# Patient Record
Sex: FEMALE | Race: WHITE | Employment: UNEMPLOYED | URBAN - METROPOLITAN AREA
[De-identification: names, ages, dates, MRNs, and addresses within clinical notes are randomized per-mention and may not be internally consistent; named-entity substitution may affect disease eponyms.]

---

## 2017-02-14 ENCOUNTER — HOSPITAL ENCOUNTER (OUTPATIENT)
Dept: NEUROLOGY | Age: 52
Discharge: HOME | End: 2017-02-14
Attending: FAMILY MEDICINE | Admitting: FAMILY MEDICINE

## 2017-02-14 DIAGNOSIS — R20.0 ANESTHESIA OF SKIN: ICD-10-CM

## 2017-03-16 RX ORDER — SODIUM CHLORIDE, SODIUM LACTATE, POTASSIUM CHLORIDE, CALCIUM CHLORIDE 600; 310; 30; 20 MG/100ML; MG/100ML; MG/100ML; MG/100ML
INJECTION, SOLUTION INTRAVENOUS CONTINUOUS
Status: CANCELLED | OUTPATIENT
Start: 2017-03-16

## 2017-03-17 ENCOUNTER — HOSPITAL ENCOUNTER (OUTPATIENT)
Dept: OTHER | Age: 52
Discharge: HOME | End: 2017-03-17
Attending: INTERNAL MEDICINE | Admitting: INTERNAL MEDICINE

## 2017-03-23 ENCOUNTER — HOSPITAL ENCOUNTER (OUTPATIENT)
Dept: OTHER | Age: 52
Discharge: HOME | End: 2017-03-23
Attending: INTERNAL MEDICINE | Admitting: INTERNAL MEDICINE

## 2017-03-23 VITALS
RESPIRATION RATE: 16 BRPM | HEART RATE: 74 BPM | DIASTOLIC BLOOD PRESSURE: 78 MMHG | SYSTOLIC BLOOD PRESSURE: 133 MMHG | OXYGEN SATURATION: 96 % | HEIGHT: 64 IN | WEIGHT: 190 LBS | BODY MASS INDEX: 32.44 KG/M2 | TEMPERATURE: 97.8 F

## 2017-03-23 LAB
GLUCOSE BLD-MCNC: 116 MG/DL (ref 70–99)
GLUCOSE BLD-MCNC: 125 MG/DL (ref 70–99)
PERFORMED ON: ABNORMAL
PERFORMED ON: ABNORMAL

## 2017-03-23 RX ORDER — LANSOPRAZOLE 30 MG/1
30 CAPSULE, DELAYED RELEASE ORAL DAILY
COMMUNITY
End: 2020-05-13

## 2017-03-23 RX ORDER — SODIUM CHLORIDE, SODIUM LACTATE, POTASSIUM CHLORIDE, CALCIUM CHLORIDE 600; 310; 30; 20 MG/100ML; MG/100ML; MG/100ML; MG/100ML
INJECTION, SOLUTION INTRAVENOUS CONTINUOUS
Status: DISCONTINUED | OUTPATIENT
Start: 2017-03-23 | End: 2017-03-24 | Stop reason: HOSPADM

## 2017-03-23 RX ORDER — FLUOXETINE HYDROCHLORIDE 20 MG/1
150 CAPSULE ORAL DAILY
COMMUNITY

## 2017-03-23 RX ORDER — LISINOPRIL 20 MG/1
20 TABLET ORAL DAILY
COMMUNITY
End: 2017-09-08

## 2017-03-23 RX ADMIN — SODIUM CHLORIDE, SODIUM LACTATE, POTASSIUM CHLORIDE, CALCIUM CHLORIDE: 600; 310; 30; 20 INJECTION, SOLUTION INTRAVENOUS at 08:47

## 2017-03-23 ASSESSMENT — PAIN - FUNCTIONAL ASSESSMENT: PAIN_FUNCTIONAL_ASSESSMENT: 0-10

## 2017-03-23 ASSESSMENT — PAIN SCALES - GENERAL: PAINLEVEL_OUTOF10: 0

## 2017-05-01 ENCOUNTER — OFFICE VISIT (OUTPATIENT)
Dept: ORTHOPEDIC SURGERY | Age: 52
End: 2017-05-01

## 2017-05-01 VITALS
DIASTOLIC BLOOD PRESSURE: 79 MMHG | SYSTOLIC BLOOD PRESSURE: 130 MMHG | WEIGHT: 200 LBS | BODY MASS INDEX: 34.15 KG/M2 | HEIGHT: 64 IN | HEART RATE: 86 BPM

## 2017-05-01 DIAGNOSIS — M25.561 RIGHT KNEE PAIN, UNSPECIFIED CHRONICITY: Primary | ICD-10-CM

## 2017-05-01 DIAGNOSIS — S83.241A ACUTE MEDIAL MENISCAL TEAR, RIGHT, INITIAL ENCOUNTER: ICD-10-CM

## 2017-05-01 PROCEDURE — 99203 OFFICE O/P NEW LOW 30 MIN: CPT | Performed by: ORTHOPAEDIC SURGERY

## 2017-05-01 PROCEDURE — L1810 KO ELASTIC WITH JOINTS: HCPCS | Performed by: ORTHOPAEDIC SURGERY

## 2017-05-01 PROCEDURE — 20610 DRAIN/INJ JOINT/BURSA W/O US: CPT | Performed by: ORTHOPAEDIC SURGERY

## 2017-05-01 PROCEDURE — 73564 X-RAY EXAM KNEE 4 OR MORE: CPT | Performed by: ORTHOPAEDIC SURGERY

## 2017-05-22 ENCOUNTER — OFFICE VISIT (OUTPATIENT)
Dept: ORTHOPEDIC SURGERY | Age: 52
End: 2017-05-22

## 2017-05-22 VITALS — BODY MASS INDEX: 34.15 KG/M2 | HEIGHT: 64 IN | WEIGHT: 200 LBS

## 2017-05-22 DIAGNOSIS — S83.241A TEAR OF MEDIAL MENISCUS OF RIGHT KNEE, UNSPECIFIED TEAR TYPE, UNSPECIFIED WHETHER OLD OR CURRENT TEAR, INITIAL ENCOUNTER: Primary | ICD-10-CM

## 2017-05-22 PROCEDURE — 99213 OFFICE O/P EST LOW 20 MIN: CPT | Performed by: PHYSICIAN ASSISTANT

## 2017-06-28 ENCOUNTER — OFFICE VISIT (OUTPATIENT)
Dept: ORTHOPEDIC SURGERY | Age: 52
End: 2017-06-28

## 2017-06-28 VITALS — HEIGHT: 64 IN | WEIGHT: 199.96 LBS | BODY MASS INDEX: 34.14 KG/M2

## 2017-06-28 DIAGNOSIS — S83.241A TEAR OF MEDIAL MENISCUS OF RIGHT KNEE, UNSPECIFIED TEAR TYPE, UNSPECIFIED WHETHER OLD OR CURRENT TEAR, INITIAL ENCOUNTER: Primary | ICD-10-CM

## 2017-06-28 PROCEDURE — 99213 OFFICE O/P EST LOW 20 MIN: CPT | Performed by: ORTHOPAEDIC SURGERY

## 2017-06-30 DIAGNOSIS — S83.231D COMPLEX TEAR OF MEDIAL MENISCUS OF RIGHT KNEE AS CURRENT INJURY, SUBSEQUENT ENCOUNTER: Primary | ICD-10-CM

## 2017-07-28 ENCOUNTER — TELEPHONE (OUTPATIENT)
Dept: ORTHOPEDIC SURGERY | Age: 52
End: 2017-07-28

## 2017-08-04 ENCOUNTER — HOSPITAL ENCOUNTER (OUTPATIENT)
Dept: PHYSICAL THERAPY | Age: 52
Discharge: HOME | End: 2017-06-30
Admitting: ORTHOPAEDIC SURGERY

## 2017-08-30 ENCOUNTER — TELEPHONE (OUTPATIENT)
Dept: ORTHOPEDIC SURGERY | Age: 52
End: 2017-08-30

## 2017-08-30 DIAGNOSIS — S83.231D COMPLEX TEAR OF MEDIAL MENISCUS OF RIGHT KNEE AS CURRENT INJURY, SUBSEQUENT ENCOUNTER: Primary | ICD-10-CM

## 2017-09-12 ENCOUNTER — HOSPITAL ENCOUNTER (OUTPATIENT)
Dept: SURGERY | Age: 52
Discharge: HOME | End: 2017-09-12
Attending: ORTHOPAEDIC SURGERY | Admitting: ORTHOPAEDIC SURGERY

## 2017-09-12 VITALS
TEMPERATURE: 97.8 F | HEART RATE: 70 BPM | SYSTOLIC BLOOD PRESSURE: 132 MMHG | OXYGEN SATURATION: 96 % | WEIGHT: 187 LBS | BODY MASS INDEX: 33.13 KG/M2 | DIASTOLIC BLOOD PRESSURE: 69 MMHG | RESPIRATION RATE: 11 BRPM | HEIGHT: 63 IN

## 2017-09-12 LAB
GLUCOSE BLD-MCNC: 110 MG/DL (ref 70–99)
PERFORMED ON: ABNORMAL

## 2017-09-12 RX ORDER — PROMETHAZINE HYDROCHLORIDE 25 MG/ML
6.25 INJECTION, SOLUTION INTRAMUSCULAR; INTRAVENOUS
Status: ACTIVE | OUTPATIENT
Start: 2017-09-12 | End: 2017-09-12

## 2017-09-12 RX ORDER — OXYCODONE HYDROCHLORIDE AND ACETAMINOPHEN 5; 325 MG/1; MG/1
2 TABLET ORAL PRN
Status: ACTIVE | OUTPATIENT
Start: 2017-09-12 | End: 2017-09-12

## 2017-09-12 RX ORDER — DIPHENHYDRAMINE HYDROCHLORIDE 50 MG/ML
12.5 INJECTION INTRAMUSCULAR; INTRAVENOUS
Status: ACTIVE | OUTPATIENT
Start: 2017-09-12 | End: 2017-09-12

## 2017-09-12 RX ORDER — OXYCODONE HYDROCHLORIDE AND ACETAMINOPHEN 5; 325 MG/1; MG/1
1 TABLET ORAL PRN
Status: ACTIVE | OUTPATIENT
Start: 2017-09-12 | End: 2017-09-12

## 2017-09-12 RX ORDER — MEPERIDINE HYDROCHLORIDE 50 MG/ML
12.5 INJECTION INTRAMUSCULAR; INTRAVENOUS; SUBCUTANEOUS EVERY 5 MIN PRN
Status: DISCONTINUED | OUTPATIENT
Start: 2017-09-12 | End: 2017-09-13 | Stop reason: HOSPADM

## 2017-09-12 RX ORDER — ONDANSETRON 2 MG/ML
4 INJECTION INTRAMUSCULAR; INTRAVENOUS
Status: ACTIVE | OUTPATIENT
Start: 2017-09-12 | End: 2017-09-12

## 2017-09-12 RX ORDER — HYDRALAZINE HYDROCHLORIDE 20 MG/ML
5 INJECTION INTRAMUSCULAR; INTRAVENOUS EVERY 10 MIN PRN
Status: DISCONTINUED | OUTPATIENT
Start: 2017-09-12 | End: 2017-09-13 | Stop reason: HOSPADM

## 2017-09-12 RX ORDER — MORPHINE SULFATE 2 MG/ML
1 INJECTION, SOLUTION INTRAMUSCULAR; INTRAVENOUS EVERY 5 MIN PRN
Status: DISCONTINUED | OUTPATIENT
Start: 2017-09-12 | End: 2017-09-13 | Stop reason: HOSPADM

## 2017-09-12 RX ORDER — LIDOCAINE HYDROCHLORIDE 10 MG/ML
1 INJECTION, SOLUTION EPIDURAL; INFILTRATION; INTRACAUDAL; PERINEURAL
Status: DISPENSED | OUTPATIENT
Start: 2017-09-12 | End: 2017-09-12

## 2017-09-12 RX ORDER — MORPHINE SULFATE 2 MG/ML
2 INJECTION, SOLUTION INTRAMUSCULAR; INTRAVENOUS EVERY 5 MIN PRN
Status: DISCONTINUED | OUTPATIENT
Start: 2017-09-12 | End: 2017-09-13 | Stop reason: HOSPADM

## 2017-09-12 RX ORDER — SODIUM CHLORIDE, SODIUM LACTATE, POTASSIUM CHLORIDE, CALCIUM CHLORIDE 600; 310; 30; 20 MG/100ML; MG/100ML; MG/100ML; MG/100ML
INJECTION, SOLUTION INTRAVENOUS CONTINUOUS
Status: DISCONTINUED | OUTPATIENT
Start: 2017-09-12 | End: 2017-09-13 | Stop reason: HOSPADM

## 2017-09-12 RX ORDER — HYDROCODONE BITARTRATE AND ACETAMINOPHEN 5; 325 MG/1; MG/1
1 TABLET ORAL EVERY 6 HOURS PRN
Qty: 28 TABLET | Refills: 0 | Status: SHIPPED | OUTPATIENT
Start: 2017-09-12 | End: 2017-09-18 | Stop reason: SDUPTHER

## 2017-09-12 RX ORDER — ACETAMINOPHEN 500 MG
500 TABLET ORAL EVERY 6 HOURS PRN
COMMUNITY

## 2017-09-12 RX ORDER — SODIUM CHLORIDE 0.9 % (FLUSH) 0.9 %
10 SYRINGE (ML) INJECTION EVERY 12 HOURS SCHEDULED
Status: DISCONTINUED | OUTPATIENT
Start: 2017-09-12 | End: 2017-09-13 | Stop reason: HOSPADM

## 2017-09-12 RX ORDER — IBUPROFEN 400 MG/1
600 TABLET ORAL EVERY 6 HOURS PRN
Status: ON HOLD | COMMUNITY
End: 2019-07-16 | Stop reason: HOSPADM

## 2017-09-12 RX ORDER — SODIUM CHLORIDE 0.9 % (FLUSH) 0.9 %
10 SYRINGE (ML) INJECTION PRN
Status: DISCONTINUED | OUTPATIENT
Start: 2017-09-12 | End: 2017-09-13 | Stop reason: HOSPADM

## 2017-09-12 RX ORDER — LABETALOL HYDROCHLORIDE 5 MG/ML
5 INJECTION, SOLUTION INTRAVENOUS EVERY 10 MIN PRN
Status: DISCONTINUED | OUTPATIENT
Start: 2017-09-12 | End: 2017-09-13 | Stop reason: HOSPADM

## 2017-09-12 RX ORDER — ACETAMINOPHEN 10 MG/ML
1000 INJECTION, SOLUTION INTRAVENOUS ONCE
Status: COMPLETED | OUTPATIENT
Start: 2017-09-12 | End: 2017-09-12

## 2017-09-12 RX ADMIN — Medication 0.5 MG: at 11:46

## 2017-09-12 RX ADMIN — Medication 0.5 MG: at 12:02

## 2017-09-12 RX ADMIN — Medication 0.5 MG: at 12:11

## 2017-09-12 RX ADMIN — Medication 0.5 MG: at 11:54

## 2017-09-12 ASSESSMENT — PAIN - FUNCTIONAL ASSESSMENT: PAIN_FUNCTIONAL_ASSESSMENT: 0-10

## 2017-09-12 ASSESSMENT — PAIN SCALES - GENERAL
PAINLEVEL_OUTOF10: 6
PAINLEVEL_OUTOF10: 7
PAINLEVEL_OUTOF10: 8

## 2017-09-15 ENCOUNTER — HOSPITAL ENCOUNTER (OUTPATIENT)
Dept: PHYSICAL THERAPY | Age: 52
Discharge: HOME | End: 2017-09-15
Admitting: ORTHOPAEDIC SURGERY

## 2017-09-18 RX ORDER — HYDROCODONE BITARTRATE AND ACETAMINOPHEN 5; 325 MG/1; MG/1
1 TABLET ORAL EVERY 6 HOURS PRN
Qty: 28 TABLET | Refills: 0 | Status: SHIPPED | OUTPATIENT
Start: 2017-09-19 | End: 2017-09-25 | Stop reason: SDUPTHER

## 2017-09-19 ENCOUNTER — HOSPITAL ENCOUNTER (OUTPATIENT)
Dept: PHYSICAL THERAPY | Age: 52
Discharge: HOME | End: 2017-09-19
Admitting: ORTHOPAEDIC SURGERY

## 2017-09-21 ENCOUNTER — HOSPITAL ENCOUNTER (OUTPATIENT)
Dept: PHYSICAL THERAPY | Age: 52
Discharge: HOME | End: 2017-09-21
Admitting: ORTHOPAEDIC SURGERY

## 2017-09-22 ENCOUNTER — OFFICE VISIT (OUTPATIENT)
Dept: ORTHOPEDIC SURGERY | Age: 52
End: 2017-09-22

## 2017-09-22 VITALS
HEIGHT: 64 IN | DIASTOLIC BLOOD PRESSURE: 71 MMHG | BODY MASS INDEX: 34.15 KG/M2 | HEART RATE: 88 BPM | SYSTOLIC BLOOD PRESSURE: 130 MMHG | WEIGHT: 200 LBS

## 2017-09-22 DIAGNOSIS — S83.231D COMPLEX TEAR OF MEDIAL MENISCUS OF RIGHT KNEE AS CURRENT INJURY, SUBSEQUENT ENCOUNTER: Primary | ICD-10-CM

## 2017-09-22 PROCEDURE — 99024 POSTOP FOLLOW-UP VISIT: CPT | Performed by: PHYSICIAN ASSISTANT

## 2017-09-25 RX ORDER — HYDROCODONE BITARTRATE AND ACETAMINOPHEN 5; 325 MG/1; MG/1
1 TABLET ORAL EVERY 6 HOURS PRN
Qty: 28 TABLET | Refills: 0 | Status: SHIPPED | OUTPATIENT
Start: 2017-09-25 | End: 2017-10-02 | Stop reason: SDUPTHER

## 2017-09-25 RX ORDER — HYDROCODONE BITARTRATE AND ACETAMINOPHEN 5; 325 MG/1; MG/1
1 TABLET ORAL EVERY 6 HOURS PRN
Qty: 28 TABLET | Refills: 0 | Status: SHIPPED | OUTPATIENT
Start: 2017-09-25 | End: 2017-09-25 | Stop reason: SDUPTHER

## 2017-09-26 ENCOUNTER — HOSPITAL ENCOUNTER (OUTPATIENT)
Dept: PHYSICAL THERAPY | Age: 52
Discharge: HOME | End: 2017-09-26
Admitting: ORTHOPAEDIC SURGERY

## 2017-10-02 RX ORDER — HYDROCODONE BITARTRATE AND ACETAMINOPHEN 5; 325 MG/1; MG/1
1 TABLET ORAL EVERY 6 HOURS PRN
Qty: 28 TABLET | Refills: 0 | Status: SHIPPED | OUTPATIENT
Start: 2017-10-02 | End: 2017-10-12 | Stop reason: SDUPTHER

## 2017-10-03 ENCOUNTER — HOSPITAL ENCOUNTER (OUTPATIENT)
Dept: PHYSICAL THERAPY | Age: 52
Discharge: HOME | End: 2017-10-03
Admitting: ORTHOPAEDIC SURGERY

## 2017-10-03 NOTE — FLOWSHEET NOTE
[facility]  Orthopaedics and Sports Rehabilitation, [facility]    Physical Therapy Daily Treatment Note  Date:  10/3/2017    Patient Name:  Gillian George    :  1965  MRN: [id number]  Restrictions/Precautions:    Medical/Treatment Diagnosis Information:  · Diagnosis: PT: R knee PMM (17)  · Treatment Diagnosis: R knee pain U46.950W  Insurance/Certification information:  PT [facility] Information[facility]  Physician Information:  Referring Practitioner: Spring Shankar of care signed (Y/N):     Date of Patient follow up with Physician:     G-Code (if applicable):      Date G-Code Applied:         Progress Note: [x]  Yes  []  No  Next due by: Visit #10  MD 10/13/17     Latex Allergy:  [x]NO      []YES  Preferred Language for Healthcare:   [x]English       []other:    Visit # Insurance Allowable   5 30     Pain level:  5/10     SUBJECTIVE:  States she is doing pretty good.  Presents today without the cane and a small limp    OBJECTIVE:   Observation:   Test measurements:  Knee flex=135   Knee ext=0    RESTRICTIONS/PRECAUTIONS: WBAT    Exercises/Interventions:     Therapeutic Ex Sets/sec Notes HEP   Belt stretch  Seated HSS 30\"x3  30\"x3     Heel prop  Heel slides 2'  x10     QS  SLR  Bridges  SSLR    2#  2#    HRs  TKE 3x10  2x10 Green        [facility] walking      Bike  6' ROM, able to complete full revolutions    Hip abd, ext 2 45#                            See other flow sheet                                                                                                                                  Therapeutic Exercise and NMR EXR  [x] (22337) Provided verbal/tactile cueing for activities related to strengthening, flexibility, endurance, ROM for improvements in LE, proximal hip, and core control with self care, mobility, lifting, ambulation.  [] (13027) Provided verbal/tactile cueing for activities related to improving balance, coordination, kinesthetic sense, posture, motor skill,

## 2017-10-05 ENCOUNTER — HOSPITAL ENCOUNTER (OUTPATIENT)
Dept: PHYSICAL THERAPY | Age: 52
Discharge: HOME | End: 2017-10-05
Admitting: ORTHOPAEDIC SURGERY

## 2017-10-05 NOTE — FLOWSHEET NOTE
hip, and core control in self care, mobility, lifting, ambulation and eccentric single leg control. NMR and Therapeutic Activities:    [] (23088 or 80458) Provided verbal/tactile cueing for activities related to improving balance, coordination, kinesthetic sense, posture, motor skill, proprioception and motor activation to allow for proper function of core, proximal hip and LE with self care and ADLs  [] (54931) Gait Re-education- Provided training and instruction to the patient for proper LE, core and proximal hip recruitment and positioning and eccentric body weight control with ambulation re-education including up and down stairs     Home Exercise Program:    [x] (51388) Reviewed/Progressed HEP activities related to strengthening, flexibility, endurance, ROM of core, proximal hip and LE for functional self-care, mobility, lifting and ambulation/stair navigation   [] (19521)Reviewed/Progressed HEP activities related to improving balance, coordination, kinesthetic sense, posture, motor skill, proprioception of core, proximal hip and LE for self care, mobility, lifting, and ambulation/stair navigation      Manual Treatments:  PROM / STM / Oscillations-Mobs:  G-I, II, III, IV (PA's, Inf., Post.)  [] (45104) Provided manual therapy to mobilize LE, proximal hip and/or LS spine soft tissue/joints for the purpose of modulating pain, promoting relaxation,  increasing ROM, reducing/eliminating soft tissue swelling/inflammation/restriction, improving soft tissue extensibility and allowing for proper ROM for normal function with self care, mobility, lifting and ambulation.      Modalities:   HV/ CP 15 minutes    Charges:  Timed Code Treatment Minutes: 45   Total Treatment Minutes: 60     [] EVAL LOW 32970  [x] OG(07331) x  2   [] IONTO  [x] NMR (88815) x  1   [] VASO  [] Manual (15787) x       [] Other:  [] TA x       [] Mech Traction (73711)  [] ES(attended) (25109)      [x] ES (un) (69367):     GOALS:   Patient stated goal: Walk without crutch    Therapist goals for Patient:   Short Term Goals: To be achieved in: 2 weeks  1. Independent in HEP and progression per patient tolerance, in order to prevent re-injury. 2. Patient will have a decrease in pain to facilitate improvement in movement, function, and ADLs as indicated by Functional Deficits. Long Term Goals: To be achieved in: 8 weeks  1. Disability index score of 20% or less for the LEFS to assist with reaching prior level of function. 2. Patient will demonstrate increased AROM to 0-130 to allow for proper joint functioning as indicated by patients Functional Deficits. 3. Patient will demonstrate an increase in Strength to good proximal hip strength and control, within 5lb HHD in LE to allow for proper functional mobility as indicated by patients Functional Deficits. 4. Patient will return to full functional activities without increased symptoms or restriction including ability to ascend/ descend stairs with reciprocal gait. 5. Patient will ambulate I without gait abnormality. Progression Towards Functional goals:  [x] Patient is progressing as expected towards functional goals listed. [] Progression is slowed due to complexities listed. [] Progression has been slowed due to co-morbidities.   [] Plan just implemented, too soon to assess goals progression  [] Other:     ASSESSMENT:  Tolerated new ex without increased pain    Treatment/Activity Tolerance:  [x] Patient tolerated treatment well [] Patient limited by fatique  [] Patient limited by pain  [] Patient limited by other medical complications  [] Other:     Prognosis: [x] Good [] Fair  [] Poor    Patient Requires Follow-up: [x] Yes  [] No    PLAN:   [x] Continue per plan of care [] Alter current plan (see comments)  [] Plan of care initiated [] Hold pending MD visit [] Discharge    Electronically signed by: Bobbi Lima PT

## 2017-10-05 NOTE — FLOWSHEET NOTE
Physical Therapist Assistant Activity Sheet    Date:  10/5/2017    Patient Name:  Sky Scanlon    :  1965  MRN: [id number]  Restrictions/Precautions:    Medical/Treatment Diagnosis Information:  ·  R knee pain PMM (17)     Physician Information:    Referring Practitioner: Nabil Hairston                                                DOS/DOI:                                                    Date: 10/3/17  10/5/17   Bike     Elliptical     Treadmill     Airdyne          Gastroc stretch     Soleus stretch     Hamstring stretch     ITB stretch     Hip Flexor stretch     Quad stretch     Adductor stretch          Weight Shifting sp                               fp                               tp     Lateral walking (with/w/o TB)          Balance: PEP/Nory board                    SLS 5\" x 10 10\" x 10         Star excursion load/explode           Extremity reach UE/LE          Leg Press Pablito. Ecc.                       Inv. 40# x 30 With PT   Calf Press Pablito. Ecc.                       Inv.          QUANG   Flex                ABd R/L 45# x 30 ea 45# x 30 R/L              ADd                TKE R 50# x 30 c 3\" hold 60# x 30 c 3\" hold R              Ext R/L 45# x 30 ea 45# x 30 R/L        Steps  Up 4\" x 30 6\" x 30               Up and Over                 Down                 Lateral                 Rotation          Squats:  Mini                    Wall                    BOSU          Lunges:  Lunge to Balance                    Balance to Lunge                    Walking          Knee Extension Bilat. 20# x 30 20# x 30                              Ecc.                                Inv. Hamstring Curls Bilat. 30# x 30 30# x 30                               Ecc.                                 Inv.          Soleus Press Bilat. Ecc.                             Inv.           [employer]

## 2017-10-12 ENCOUNTER — HOSPITAL ENCOUNTER (OUTPATIENT)
Dept: PHYSICAL THERAPY | Age: 52
Discharge: HOME | End: 2017-10-12
Admitting: ORTHOPAEDIC SURGERY

## 2017-10-12 RX ORDER — HYDROCODONE BITARTRATE AND ACETAMINOPHEN 5; 325 MG/1; MG/1
1 TABLET ORAL EVERY 6 HOURS PRN
Qty: 28 TABLET | Refills: 0 | Status: SHIPPED | OUTPATIENT
Start: 2017-10-12 | End: 2017-10-19 | Stop reason: SDUPTHER

## 2017-10-12 NOTE — OP NOTE
Orthopaedics and Sports Rehabilitation                        Date: 10/12/2017  Physician: Ori Suazo  Patient: Lelia Cardenas Diagnosis: R PMM    Patient has received 7 sessions of Physical Therapy       ROM Initial (R) Initial (L) Current (R) Current (L)   Knee flex   135    Knee ext   0                         Strength       Knee flex  Knee ext  Hip flex   5/5  4+/5  4+/5                                  Functional Activity Checklist: The patient continues to have moderate difficulty with the following:   [] Personal care  [] Reaching / overhead  [] Standing    [] Housework chores  [] Climbing  [] Driving / riding in a vehicle    [] Work  [] Squatting  [] Bed / vehicle mobility    [] Lifting  [] Walking  [] Sleeping    [] Pushing / pulling  [] Sitting  [] Concentrating / reading     Specific Functional Improvement and Impression:  Pain and difficulty going down steps    Summary:   [x] Patient is progressing as expected for this condition   [] Patient is progressing, but slower than expected for this condition   [] Patient is not progressing  Clinician Recommendations:   [x] Continue rehabilitation due to objective improvement and continued functional deficits. [] Follow up periodically to advance home exercise program to match level of function. [] Continue rehabitation due to objective improvement and continued functional deficits with progression to work conditioning. [] Discharge to post-rehab program secondary to maximizing \"medical necessity\" of physical / occupational therapy.    [] Discharge independent in a home program as:  [] All goals achieved  [] Maximized \"medical necessity\" of physical / occupational therapy  [] No subjective or objective improvements    Plan: cont 2-4 weeks for func strength to go down the steps  Electronically signed by: Brenna Angel PT   License #:     Physician Recommendations:  [] Follow treatment plan as above [] Discontinue physical therapy  [] Change plan to: _______________________________________________________________    [] EGDAR ([phone])  [x] EGO ([phone])  [] AND ([phone])          Fax   [phone]                       Fax  [phone]                  Fax  [phone]              [] [address] Lexy Mulligan ([phone])  [] CBC ([phone])  [] JESSICA ([phone])       Fax   [phone]                   Fax  [phone]                        Fax   [phone]

## 2017-10-12 NOTE — FLOWSHEET NOTE
Physical Therapist Assistant Activity Sheet    Date:  10/12/2017    Patient Name:  Florecita Abernathy    :  1965  MRN: [id number]  Restrictions/Precautions:    Medical/Treatment Diagnosis Information:  ·  R knee pain PMM (17)     Physician Information:    Referring Practitioner: Alondra Urbano                                                DOS/DOI:                                                    Date: 10/3/17  10/5/17 10/12/17   Bike      Elliptical      Treadmill      Airdyne            Gastroc stretch      Soleus stretch      Hamstring stretch      ITB stretch      Hip Flexor stretch      Quad stretch      Adductor stretch            Weight Shifting sp                                fp                                tp      Lateral walking (with/w/o TB)            Balance: PEP/Nory board                     SLS 5\" x 10 10\" x 10 10\" x 10 UE assist         Star excursion load/explode            Extremity reach UE/LE            Leg Press Pablito. Ecc.                        Inv. 40# x 30 With PT 40# x30    Calf Press Pablito. Ecc.                        Inv.            QUANG   Flex                 ABd R/L 45# x 30 ea 45# x 30 R/L 45# x 30 R/L              ADd                 TKE R 50# x 30 c 3\" hold 60# x 30 c 3\" hold R 60# x 30 c 3\" hold B              Ext R/L 45# x 30 ea 45# x 30 R/L 45# x 26 R/L x44         Steps  Up 4\" x 30 6\" x 30                Up and Over   8\" x10               Down                  Lateral                  Rotation            Squats:  Mini                     Wall                     BOSU            Lunges:  Lunge to Balance                     Balance to Lunge                     Walking            Knee Extension Bilat. 20# x 30 20# x 30 Painful today                              Ecc.                                 Inv. Hamstring Curls Bilat.  30# x 30 30# x 30                                Ecc. Inv.   10# x30 R         Soleus Press Bilat. Ecc.                              Inv. Ladders      Square      Jump/Hop  Low                         Med.                         High                  Modality      PTA Assessment Tolerated tx well. SLS with BUE assist. No challenge with 6\" step.      Time Based Treatment 30 minutes 30 minutes See other flow sheet     Electronically signed by: Nick Pagan PTA

## 2017-10-17 ENCOUNTER — HOSPITAL ENCOUNTER (OUTPATIENT)
Dept: PHYSICAL THERAPY | Age: 52
Discharge: HOME | End: 2017-10-17
Admitting: ORTHOPAEDIC SURGERY

## 2017-10-17 NOTE — FLOWSHEET NOTE
balance, coordination, kinesthetic sense, posture, motor skill, proprioception  to assist with LE, proximal hip, and core control in self care, mobility, lifting, ambulation and eccentric single leg control. NMR and Therapeutic Activities:    [] (86190 or 56619) Provided verbal/tactile cueing for activities related to improving balance, coordination, kinesthetic sense, posture, motor skill, proprioception and motor activation to allow for proper function of core, proximal hip and LE with self care and ADLs  [] (26740) Gait Re-education- Provided training and instruction to the patient for proper LE, core and proximal hip recruitment and positioning and eccentric body weight control with ambulation re-education including up and down stairs     Home Exercise Program:    [x] (45052) Reviewed/Progressed HEP activities related to strengthening, flexibility, endurance, ROM of core, proximal hip and LE for functional self-care, mobility, lifting and ambulation/stair navigation   [] (65049)Reviewed/Progressed HEP activities related to improving balance, coordination, kinesthetic sense, posture, motor skill, proprioception of core, proximal hip and LE for self care, mobility, lifting, and ambulation/stair navigation      Manual Treatments:  PROM / STM / Oscillations-Mobs:  G-I, II, III, IV (PA's, Inf., Post.)  [] (69063) Provided manual therapy to mobilize LE, proximal hip and/or LS spine soft tissue/joints for the purpose of modulating pain, promoting relaxation,  increasing ROM, reducing/eliminating soft tissue swelling/inflammation/restriction, improving soft tissue extensibility and allowing for proper ROM for normal function with self care, mobility, lifting and ambulation.      Modalities:   HV/ CP 15 minutes    Charges:  Timed Code Treatment Minutes: 45   Total Treatment Minutes: 60     [] EVAL LOW 09337  [x] QG(05845) x  2   [] IONTO  [x] NMR (82869) x  1   [] VASO  [] Manual (93396) x       [] Other:  [] TA x [] [facility] Traction (31863)  [] ES(attended) (45764)      [x] ES (un) (97450):     GOALS:   Patient stated goal: Walk without crutch    Therapist goals for Patient:   Short Term Goals: To be achieved in: 2 weeks  1. Independent in HEP and progression per patient tolerance, in order to prevent re-injury. 2. Patient will have a decrease in pain to facilitate improvement in movement, function, and ADLs as indicated by Functional Deficits. Long Term Goals: To be achieved in: 8 weeks  1. Disability index score of 20% or less for the LEFS to assist with reaching prior level of function. 2. Patient will demonstrate increased AROM to 0-130 to allow for proper joint functioning as indicated by patients Functional Deficits. 3. Patient will demonstrate an increase in Strength to good proximal hip strength and control, within 5lb HHD in LE to allow for proper functional mobility as indicated by patients Functional Deficits. 4. Patient will return to full functional activities without increased symptoms or restriction including ability to ascend/ descend stairs with reciprocal gait. 5. Patient will ambulate I without gait abnormality. Progression Towards Functional goals:  [x] Patient is progressing as expected towards functional goals listed. [] Progression is slowed due to complexities listed. [] Progression has been slowed due to co-morbidities.   [] Plan just implemented, too soon to assess goals progression  [] Other:     ASSESSMENT:  Having increased pain today from going up/down steps the other day for laundry    Treatment/Activity Tolerance:  [x] Patient tolerated treatment well [] Patient limited by fatique  [] Patient limited by pain  [] Patient limited by other medical complications  [] Other:     Prognosis: [x] Good [] Fair  [] Poor    Patient Requires Follow-up: [x] Yes  [] No    PLAN:   [x] Continue per plan of care [] Alter current plan (see comments)  [] Plan of care initiated [] Hold pending

## 2017-10-17 NOTE — FLOWSHEET NOTE
Physical Therapist Assistant Activity Sheet    Date:  10/17/2017    Patient Name:  Florecita Abernathy    :  1965  MRN: [id number]  Restrictions/Precautions:    Medical/Treatment Diagnosis Information:  ·  R PMM  ·    Physician Information:       Weeks Post-op  8 wks  12 wks 16 wks 20 wks   24 wks                            Activities                                                DOS/DOI:                                                    Date: 10/17/17    Bike 6'   Elliptical    Treadmill    Airdyne        Gastroc stretch    Soleus stretch    Hamstring stretch    ITB stretch    Hip Flexor stretch    Quad stretch    Adductor stretch        Weight Shifting sp                              fp                              tp    Lateral walking (with/w/o TB)        Balance: PEP/Nory board                   SLS 10\" x 10         Star excursion load/explode          Extremity reach UE/LE        Leg Press Pablito. Ecc.                      Inv. 45# x 30   Calf Press Pablito. Ecc.                      Inv.        QUANG   Flex               ABd R/L 45# x 30 ea              ADd               TKE 60# x 30 c 3\" hold              Ext R/L 45# x 30 ea       Steps  Up                Up and Over 6\" x 20               Down                Lateral                Rotation        Squats:  Mini                   Wall                   BOSU        Lunges:  Lunge to Balance                   Balance to Lunge                   Walking        Knee Extension Bilat. Ecc.                               Inv. Hamstring Curls Bilat. Ecc.                                Inv. 15# x 30       Soleus Press Bilat. Ecc.                            Inv. Ladders    Square    Jump/Hop  Low                       Med.                       High            Modality    PTA Assessment Good exercise tolerance.    Time Based Treatment 30 minutes

## 2017-10-19 ENCOUNTER — HOSPITAL ENCOUNTER (OUTPATIENT)
Dept: PHYSICAL THERAPY | Age: 52
Discharge: HOME | End: 2017-10-19
Admitting: ORTHOPAEDIC SURGERY

## 2017-10-19 ENCOUNTER — OFFICE VISIT (OUTPATIENT)
Dept: ORTHOPEDIC SURGERY | Age: 52
End: 2017-10-19

## 2017-10-19 VITALS — BODY MASS INDEX: 34.15 KG/M2 | HEIGHT: 64 IN | WEIGHT: 200 LBS

## 2017-10-19 DIAGNOSIS — S83.231D COMPLEX TEAR OF MEDIAL MENISCUS OF RIGHT KNEE AS CURRENT INJURY, SUBSEQUENT ENCOUNTER: Primary | ICD-10-CM

## 2017-10-19 PROCEDURE — 99024 POSTOP FOLLOW-UP VISIT: CPT | Performed by: PHYSICIAN ASSISTANT

## 2017-10-19 RX ORDER — HYDROCODONE BITARTRATE AND ACETAMINOPHEN 5; 325 MG/1; MG/1
1 TABLET ORAL EVERY 8 HOURS PRN
Qty: 20 TABLET | Refills: 0 | Status: SHIPPED | OUTPATIENT
Start: 2017-10-19 | End: 2017-10-26

## 2017-10-19 NOTE — FLOWSHEET NOTE
[facility]    Physical Therapy Daily Treatment Note  Date:  10/19/2017    Patient Name:  Kathy Camp    :  1965  MRN: [id number]  Restrictions/Precautions:    Medical/Treatment Diagnosis Information:  · Diagnosis: PT: R knee PMM (17)  · Treatment Diagnosis: R knee pain P48.071G  Insurance/Certification information:  [facility]  Physician Information:  Referring Practitioner: Cari Cuba of care signed (Y/N):     Date of Patient follow up with Physician:     G-Code (if applicable):      Date G-Code Applied:         Progress Note: [x]  Yes  []  No  Next due by: Visit #10  MD 10/19/17     Latex Allergy:  [x]NO      []YES  Preferred Language for Healthcare:   [x]English       []other:    Visit # Insurance Allowable    30     Pain level:  3/10     SUBJECTIVE: Pt stated feeling pretty good, stated continues to have some increase in pain when performing flight of stairs at home.     OBJECTIVE:  Observation:   Test measurements:  Knee flex=135   Knee ext=0    RESTRICTIONS/PRECAUTIONS: WBAT    Exercises/Interventions:     Therapeutic Ex Sets/sec Notes HEP   Belt stretch  Seated HSS        Heel prop  Heel slides  Reviewed    QS  SLR  Bridges  SSLR    2#  2#    HRs  TKE 3x10  2x10 Green        [facility] walking      Bike  6' ROM, able to complete full revolutions    Hip abd, ext 2 45#                            See other flow sheet                                                                                                                                  Therapeutic Exercise and NMR EXR  [x] (12327) Provided verbal/tactile cueing for activities related to strengthening, flexibility, endurance, ROM for improvements in LE, proximal hip, and core control with self care, mobility, lifting, ambulation.  [] (74608) Provided verbal/tactile cueing for activities related to improving balance, coordination, kinesthetic sense, posture, motor skill, proprioception  to assist with LE, proximal hip, and core control in self care, mobility, lifting, ambulation and eccentric single leg control. NMR and Therapeutic Activities:    [x] (06499 or 77882) Provided verbal/tactile cueing for activities related to improving balance, coordination, kinesthetic sense, posture, motor skill, proprioception and motor activation to allow for proper function of core, proximal hip and LE with self care and ADLs  [] (12073) Gait Re-education- Provided training and instruction to the patient for proper LE, core and proximal hip recruitment and positioning and eccentric body weight control with ambulation re-education including up and down stairs     Home Exercise Program:    [x] (34749) Reviewed/Progressed HEP activities related to strengthening, flexibility, endurance, ROM of core, proximal hip and LE for functional self-care, mobility, lifting and ambulation/stair navigation   [] (94304)Reviewed/Progressed HEP activities related to improving balance, coordination, kinesthetic sense, posture, motor skill, proprioception of core, proximal hip and LE for self care, mobility, lifting, and ambulation/stair navigation      Manual Treatments:  PROM / STM / Oscillations-Mobs:  G-I, II, III, IV (PA's, Inf., Post.)  [] (35782) Provided manual therapy to mobilize LE, proximal hip and/or LS spine soft tissue/joints for the purpose of modulating pain, promoting relaxation,  increasing ROM, reducing/eliminating soft tissue swelling/inflammation/restriction, improving soft tissue extensibility and allowing for proper ROM for normal function with self care, mobility, lifting and ambulation.      Modalities:   HV/ CP 15 minutes    Charges:  Timed Code Treatment Minutes: 45   Total Treatment Minutes: 60     [] EVAL LOW 78759  [x] YZ(68598) x  2   [] IONTO  [x] NMR (97463) x  1   [] VASO  [] Manual (50541) x       [] Other:  [] TA x       [] Mech Traction (61785)  [] comments)  [] Plan of care initiated [] Hold pending MD visit [] Discharge    Electronically signed by: Mak Gomez PT

## 2017-10-19 NOTE — FLOWSHEET NOTE
Ladders     Square     Jump/Hop  Low                        Med.                        High               Modality     PTA Assessment Good exercise tolerance. Knee felt a little sore. Good exercise tolerance.     Time Based Treatment 30 minutes 30 minutes

## 2017-10-19 NOTE — OP NOTE
Orthopaedics and Sports Rehabilitation                        Date: 10/19/2017  Physician: Dr. Gwen Bravo Patient: Rosalba Maynard Diagnosis: Diagnosis: PT: R knee PMM (9/12/17)    Patient has received 9 sessions of Physical Therapy over a 5 week period. Functional Questionnaire Score: Initial   Pain reported has decreased from     ROM Initial (R) Initial (L) Current (R) Current (L)   Knee flexion 64  135    Knee extension Lacking 14 deg  0                         Strength Not tested initially      Hip flexion   5/5    Knee extension    4/5    Knee flexion   4/5                    Functional Activity Checklist: The patient continues to have moderate difficulty with the following:   [] Personal care  [] Reaching / overhead  [] Standing    [x] Housework chores  [] Climbing  [] Driving / riding in a vehicle    [] Work  [x] Squatting  [] Bed / vehicle mobility    [] Lifting  [] Walking  [] Sleeping    [] Pushing / pulling  [] Sitting  [] Concentrating / reading     Specific Functional Improvement and Impression:  Patient showing improvement in knee ROM, continues to show deficits in RLE strength. Patient shows good tolerance to exercises, stated continues to have difficulty at times performing stairs at home. Summary:   [x] Patient is progressing as expected for this condition   [] Patient is progressing, but slower than expected for this condition   [] Patient is not progressing  Clinician Recommendations:   [x] Continue rehabilitation due to objective improvement and continued functional deficits. [] Follow up periodically to advance home exercise program to match level of function. [] Continue rehabitation due to objective improvement and continued functional deficits with progression to work conditioning. [] Discharge to post-rehab program secondary to maximizing \"medical necessity\" of physical / occupational therapy.    [] Discharge independent in a home program as:  [] All goals achieved  [] Maximized \"medical necessity\" of physical / occupational therapy  [] No subjective or objective improvements    Plan: 2x/wk 3 weeks  Electronically signed by: Shivani Saba PT   License #:     Physician Recommendations:  [] Follow treatment plan as above [] Discontinue physical therapy  [] Change plan to: _______________________________________________________________    [] EDGAR [phone])  [] EGO [phone])  [] AND ([phone])          Fax   [phone]                       Fax  [phone]                  Fax  [phone]              [] [address] Mulligan [phone]  [] CBC [phone])  [] JESSICA [phone])       Fax   [phone]                   Fax  [phone]                        Fax   [phone]

## 2017-11-01 ENCOUNTER — HOSPITAL ENCOUNTER (OUTPATIENT)
Dept: PHYSICAL THERAPY | Age: 52
Discharge: HOME | End: 2017-11-30
Attending: ORTHOPAEDIC SURGERY | Admitting: ORTHOPAEDIC SURGERY

## 2018-01-03 RX ORDER — SODIUM CHLORIDE, SODIUM LACTATE, POTASSIUM CHLORIDE, CALCIUM CHLORIDE 600; 310; 30; 20 MG/100ML; MG/100ML; MG/100ML; MG/100ML
INJECTION, SOLUTION INTRAVENOUS CONTINUOUS
Status: CANCELLED | OUTPATIENT
Start: 2018-01-03

## 2018-01-04 ENCOUNTER — HOSPITAL ENCOUNTER (OUTPATIENT)
Dept: OTHER | Age: 53
Discharge: HOME | End: 2018-01-04
Attending: INTERNAL MEDICINE | Admitting: INTERNAL MEDICINE

## 2018-01-09 ENCOUNTER — HOSPITAL ENCOUNTER (OUTPATIENT)
Dept: OTHER | Age: 53
Discharge: HOME | End: 2018-01-09
Attending: FAMILY MEDICINE | Admitting: FAMILY MEDICINE

## 2018-01-09 DIAGNOSIS — J20.9 ACUTE BRONCHITIS, UNSPECIFIED ORGANISM: ICD-10-CM

## 2018-02-08 ENCOUNTER — HOSPITAL ENCOUNTER (OUTPATIENT)
Dept: OTHER | Age: 53
Discharge: HOME | End: 2018-02-08
Attending: FAMILY MEDICINE | Admitting: FAMILY MEDICINE

## 2018-02-08 DIAGNOSIS — M25.531 RIGHT WRIST PAIN: ICD-10-CM

## 2018-03-06 RX ORDER — SODIUM CHLORIDE, SODIUM LACTATE, POTASSIUM CHLORIDE, CALCIUM CHLORIDE 600; 310; 30; 20 MG/100ML; MG/100ML; MG/100ML; MG/100ML
INJECTION, SOLUTION INTRAVENOUS CONTINUOUS
Status: CANCELLED | OUTPATIENT
Start: 2018-03-06

## 2018-03-07 ENCOUNTER — HOSPITAL ENCOUNTER (OUTPATIENT)
Dept: OTHER | Age: 53
Discharge: HOME | End: 2018-03-26
Attending: INTERNAL MEDICINE | Admitting: INTERNAL MEDICINE

## 2018-08-30 ENCOUNTER — HOSPITAL ENCOUNTER (OUTPATIENT)
Dept: GENERAL RADIOLOGY | Age: 53
Discharge: HOME | End: 2018-08-30
Payer: COMMERCIAL

## 2018-08-30 ENCOUNTER — HOSPITAL ENCOUNTER (OUTPATIENT)
Age: 53
Discharge: HOME | End: 2018-08-30
Payer: COMMERCIAL

## 2018-08-30 DIAGNOSIS — J45.901 ASTHMA WITH ACUTE EXACERBATION, UNSPECIFIED ASTHMA SEVERITY, UNSPECIFIED WHETHER PERSISTENT: ICD-10-CM

## 2018-08-30 PROCEDURE — 71046 X-RAY EXAM CHEST 2 VIEWS: CPT

## 2019-05-16 ENCOUNTER — OFFICE VISIT (OUTPATIENT)
Dept: ORTHOPEDIC SURGERY | Age: 54
End: 2019-05-16
Payer: COMMERCIAL

## 2019-05-16 VITALS — WEIGHT: 199.96 LBS | HEIGHT: 64 IN | BODY MASS INDEX: 34.14 KG/M2

## 2019-05-16 DIAGNOSIS — M17.11 ARTHRITIS OF RIGHT KNEE: ICD-10-CM

## 2019-05-16 DIAGNOSIS — M25.561 RIGHT KNEE PAIN, UNSPECIFIED CHRONICITY: Primary | ICD-10-CM

## 2019-05-16 PROCEDURE — G8427 DOCREV CUR MEDS BY ELIG CLIN: HCPCS | Performed by: PHYSICIAN ASSISTANT

## 2019-05-16 PROCEDURE — G8417 CALC BMI ABV UP PARAM F/U: HCPCS | Performed by: PHYSICIAN ASSISTANT

## 2019-05-16 PROCEDURE — 20610 DRAIN/INJ JOINT/BURSA W/O US: CPT | Performed by: PHYSICIAN ASSISTANT

## 2019-05-16 PROCEDURE — 4004F PT TOBACCO SCREEN RCVD TLK: CPT | Performed by: PHYSICIAN ASSISTANT

## 2019-05-16 PROCEDURE — 99213 OFFICE O/P EST LOW 20 MIN: CPT | Performed by: PHYSICIAN ASSISTANT

## 2019-05-16 PROCEDURE — 3017F COLORECTAL CA SCREEN DOC REV: CPT | Performed by: PHYSICIAN ASSISTANT

## 2019-05-16 NOTE — PROGRESS NOTES
MD Regine Preciado PA-C         Orthopaedic Surgery and Sports Medicine        Chief Complaint:  Knee Pain (right knee)      History of Present Illness:  Chris Bueno is a 53 y.o. female here regarding right knee pain. The pain is located medial.   Patient feels this discomfort may be related to a known injury No, but patient does admit last week she hit her leg against the side of the bed. The patient describes the symptoms as aching and sharp. Symptoms improve with rest, avoiding painful activities. The symptoms are worse with activity, weight bearing. Discomfort has been progressive over time. Sleeping habits related to chief complaint:fair    Patient does have a history of a partial meniscectomy performed by Dr. Shilo Dugan in October 2017. She was doing fairly well but reports that over the past few months her pain has worsened. She is having difficulty with her range of motion and does complain of swelling. Her pain is located anemia on the medial aspect. She has been taking ibuprofen with minimal relief. 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 Outside reports reviewed: historical medical records. Medical History:  Patient's medications, allergies, past medical, surgical, social and family histories were reviewed and updated as appropriate.     Past Medical History:   Diagnosis Date    Arthritis     Asthma     Cancer ([address]     skin \"precancerous\"    Cystitis, interstitial     Diabetes mellitus ([address]     borderline    DUB (dysfunctional uterine bleeding) 3/2013    GERD (gastroesophageal reflux disease)     Hypercholesterolemia     no meds currently    Hypertension     Kidney stones     PONV (postoperative nausea and vomiting)     Sleep apnea     never fitted for an appliance       Past Surgical History:   Procedure Laterality Date  APPENDECTOMY      BLADDER SURGERY  2010    X 3     SECTION  2001    COLONOSCOPY  2017    Polyps    ECTOPIC PREGNANCY SURGERY      HYSTERECTOMY N/A 13    ROBOTIC ASSISTED TOTAL LAPAROSCOPIC HYSTERECTOMY    KNEE ARTHROSCOPY Right 2017    LITHOTRIPSY      X 3    SKIN BIOPSY         Family History   Problem Relation Age of Onset    High Blood Pressure Mother     Cancer Father         lung    High Blood Pressure Father        Social History     Socioeconomic History    Marital status:      Spouse name: None    Number of children: None    Years of education: None    Highest education level: None   Occupational History    None   Social Needs    Financial resource strain: None    Food insecurity:     Worry: None     Inability: None    Transportation needs:     Medical: None     Non-medical: None   Tobacco Use    Smoking status: Current Every Day Smoker     Packs/day: 0.50     Years: 40.00     Pack years: 20.00     Types: Cigarettes    Smokeless tobacco: Never Used   Substance and Sexual Activity    Alcohol use: Yes     Comment: 6 X per year    Drug use: Yes     Frequency: 1.0 times per week     Types: Marijuana     Comment: none since     Sexual activity: Yes     Partners: Male   Lifestyle    Physical activity:     Days per week: None     Minutes per session: None    Stress: None   Relationships    Social connections:     Talks on phone: None     Gets together: None     Attends Hindu service: None     Active member of club or organization: None     Attends meetings of clubs or organizations: None     Relationship status: None    Intimate partner violence:     Fear of current or ex partner: None     Emotionally abused: None     Physically abused: None     Forced sexual activity: None   Other Topics Concern    None   Social History Narrative    None       Current Outpatient Medications   Medication Sig Dispense Refill    ibuprofen (ADVIL;MOTRIN) 400 MG tablet Take 600 mg by mouth every 6 hours as needed for Pain      acetaminophen (TYLENOL) 500 MG tablet Take 500 mg by mouth every 6 hours as needed for Pain      lisinopril-hydrochlorothiazide (PRINZIDE;ZESTORETIC) 10-12.5 MG per tablet Take 1 tablet by mouth daily      FLUoxetine (PROZAC) 20 MG capsule Take 20 mg by mouth daily      lansoprazole (PREVACID) 30 MG delayed release capsule Take 30 mg by mouth daily      albuterol (PROVENTIL HFA;VENTOLIN HFA) 108 (90 BASE) MCG/ACT inhaler Use 2 puffs 4 times daily for 7 days then as needed for wheezing. Dispense with Spacer and instruct in use. At patient's preference may use 60 dose MDI. May Sub Pro-Air or Proventil as needed per insurance. 1 Inhaler 0    atorvastatin (LIPITOR) 10 MG tablet Take 10 mg by mouth daily. No current facility-administered medications for this visit. Allergies   Allergen Reactions    Morphine Nausea And Vomiting          Review of Systems:   Pertinent items are noted in HPI  Review of systems reviewed from Patient History Form dated on 05/16/2019 and available in the patient's chart under the Media tab. Vital Signs: There were no vitals filed for this visit. Pain Assessment:            General Exam:   Constitutional: Patient is adequately groomed with no evidence of malnutrition  Mental Status: The patient is oriented to time, place and person. The patient's mood and affect are appropriate. Vascular: Examination reveals no swelling or calf tenderness. Peripheral pulses are palpable and 2+. Neurological: The patient has good coordination. There is no weakness or sensory deficit. Right Knee Examination  Inspection:   Knee alignment: neutral  mild swelling noted. No erythema or ecchymosis. Skin is intact with no cellulitis, rashes, ulcerations, lymphedema or cutaneous lesions noted. Gait: antalgic. The patient can bear weight on the extremity.     Palpation: moderate tenderness to palpation on the lateral joint line and medial joint line. a small effusion noted. Range of Motion:  Full, but with pain    Special Tests: Jimmy's test: not tested       Varus laxity at 30 degrees: negative       Valgus laxity at 30 degrees: negative    Strength: no gross motor weakness noted. Motor exam of the lower extremities show quadriceps, hamstrings, foot dorsiflexion and plantarflexion grossly intact. Neurologic & vascular: Sensation to both feet is grossly intact to light touch. The bilateral lower extremities are warm and well-perfused with brisk capillary refill. Additional Examinations:  Left Lower Extremity: Examination of the left lower extremity does not show any tenderness, deformity or injury. Range of motion is within normal limits. There is no gross instability. There are no rashes, ulcerations or lesions. Strength and tone are normal.      DIAGNOSTICS  Xrays obtained in office today: Yes  Xrays reviewed today: Yes  4 views of the right knee show   Fracture:no   Dislocation: no  Patellofemoral arthritis: mild  Medial compartment: moderate  Lateral compartment: mild  Varus deformity: No  Valgus deformity: No      ASSESSMENT (Medical Decision Making)    Vineet Voss is a 53 y.o. female with the following diagnosis:  right mild knee arthritis worse in medial compartment with acute exacerbation      ICD-10-CM    1. Right knee pain, unspecified chronicity M25.561 XR KNEE RIGHT (MIN 4 VIEWS)   2. Arthritis of right knee M17.11        Her overall course is worsening despite conservative treatment      PLAN (Medical Decision Making)  Office Procedures:  Orders Placed This Encounter   Procedures    XR KNEE RIGHT (MIN 4 VIEWS)     Standing Status:   Future     Number of Occurrences:   1     Standing Expiration Date:   5/14/2020     The risks and benefits of an injection were discussed with the patient. The patient had full opportunity to ask questions and all were answered.   The patient then was checked for errors. It is possible that there are still dictated errors within this office note. If so, please bring any errors to my attention for an addendum. All efforts were made to ensure that this office note is accurate.

## 2019-05-16 NOTE — PROGRESS NOTES
SITE;    BUPIVACAINE    NDC#  5218-4019-09  LOT NUMBER: [id number]    DEPO    NDC# 6478-2967-81  LOT NUMBER: [id number]    LIDOCAINE   NDC# 4058-7615-01  LOT NUMBER: -[id number]

## 2019-05-22 RX ORDER — MELOXICAM 15 MG/1
15 TABLET ORAL DAILY PRN
Qty: 90 TABLET | Refills: 3 | Status: ON HOLD
Start: 2019-05-22 | End: 2019-07-16 | Stop reason: HOSPADM

## 2019-07-13 ENCOUNTER — APPOINTMENT (OUTPATIENT)
Dept: GENERAL RADIOLOGY | Age: 54
DRG: 133 | End: 2019-07-13
Payer: COMMERCIAL

## 2019-07-13 ENCOUNTER — HOSPITAL ENCOUNTER (INPATIENT)
Age: 54
LOS: 1 days | Discharge: ANOTHER HOSPITAL | DRG: 133 | End: 2019-07-13
Attending: EMERGENCY MEDICINE | Admitting: INTERNAL MEDICINE
Payer: COMMERCIAL

## 2019-07-13 ENCOUNTER — HOSPITAL ENCOUNTER (INPATIENT)
Age: 54
LOS: 3 days | Discharge: HOME | DRG: 192 | End: 2019-07-16
Attending: INTERNAL MEDICINE | Admitting: INTERNAL MEDICINE
Payer: COMMERCIAL

## 2019-07-13 VITALS
SYSTOLIC BLOOD PRESSURE: 159 MMHG | DIASTOLIC BLOOD PRESSURE: 107 MMHG | HEART RATE: 99 BPM | WEIGHT: 200 LBS | BODY MASS INDEX: 34.15 KG/M2 | OXYGEN SATURATION: 100 % | HEIGHT: 64 IN | TEMPERATURE: 97.8 F | RESPIRATION RATE: 19 BRPM

## 2019-07-13 DIAGNOSIS — J96.01 ACUTE RESPIRATORY FAILURE WITH HYPOXIA (HCC): Primary | ICD-10-CM

## 2019-07-13 DIAGNOSIS — I50.21 ACUTE SYSTOLIC HEART FAILURE (HCC): ICD-10-CM

## 2019-07-13 DIAGNOSIS — I50.31 ACUTE DIASTOLIC CONGESTIVE HEART FAILURE (HCC): Primary | ICD-10-CM

## 2019-07-13 DIAGNOSIS — J81.0 ACUTE PULMONARY EDEMA (HCC): ICD-10-CM

## 2019-07-13 PROBLEM — R07.9 CHEST PAIN: Status: ACTIVE | Noted: 2019-07-13

## 2019-07-13 LAB
ALBUMIN SERPL-MCNC: 4 G/DL (ref 3.4–5)
ANION GAP SERPL CALCULATED.3IONS-SCNC: 14 MMOL/L (ref 3–16)
ANION GAP SERPL CALCULATED.3IONS-SCNC: 19 MMOL/L (ref 3–16)
BASOPHILS ABSOLUTE: 0.1 K/UL (ref 0–0.2)
BASOPHILS RELATIVE PERCENT: 0.9 %
BUN BLDV-MCNC: 13 MG/DL (ref 7–20)
BUN BLDV-MCNC: 13 MG/DL (ref 7–20)
CALCIUM SERPL-MCNC: 9.4 MG/DL (ref 8.3–10.6)
CALCIUM SERPL-MCNC: 9.4 MG/DL (ref 8.3–10.6)
CHLORIDE BLD-SCNC: 103 MMOL/L (ref 99–110)
CHLORIDE BLD-SCNC: 97 MMOL/L (ref 99–110)
CO2: 22 MMOL/L (ref 21–32)
CO2: 30 MMOL/L (ref 21–32)
CREAT SERPL-MCNC: 0.7 MG/DL (ref 0.6–1.1)
CREAT SERPL-MCNC: 1.2 MG/DL (ref 0.6–1.1)
EKG ATRIAL RATE: 92 BPM
EKG DIAGNOSIS: NORMAL
EKG P AXIS: 68 DEGREES
EKG P-R INTERVAL: 136 MS
EKG Q-T INTERVAL: 410 MS
EKG QRS DURATION: 80 MS
EKG QTC CALCULATION (BAZETT): 507 MS
EKG R AXIS: 36 DEGREES
EKG T AXIS: 77 DEGREES
EKG VENTRICULAR RATE: 92 BPM
EOSINOPHILS ABSOLUTE: 0.2 K/UL (ref 0–0.6)
EOSINOPHILS RELATIVE PERCENT: 2.6 %
GFR AFRICAN AMERICAN: 56
GFR AFRICAN AMERICAN: >60
GFR NON-AFRICAN AMERICAN: 47
GFR NON-AFRICAN AMERICAN: >60
GLUCOSE BLD-MCNC: 175 MG/DL (ref 70–99)
GLUCOSE BLD-MCNC: 180 MG/DL (ref 70–99)
GLUCOSE BLD-MCNC: 189 MG/DL (ref 70–99)
GLUCOSE BLD-MCNC: 245 MG/DL (ref 70–99)
HCT VFR BLD CALC: 43.5 % (ref 36–48)
HEMOGLOBIN: 14.1 G/DL (ref 12–16)
LACTIC ACID: 1.1 MMOL/L (ref 0.4–2)
LACTIC ACID: 8.3 MMOL/L (ref 0.4–2)
LEFT VENTRICULAR EJECTION FRACTION HIGH VALUE: 40 %
LEFT VENTRICULAR EJECTION FRACTION MODE: NORMAL
LV EF: 35 %
LYMPHOCYTES ABSOLUTE: 4 K/UL (ref 1–5.1)
LYMPHOCYTES RELATIVE PERCENT: 46.9 %
MAGNESIUM: 1.7 MG/DL (ref 1.8–2.4)
MCH RBC QN AUTO: 30.2 PG (ref 26–34)
MCHC RBC AUTO-ENTMCNC: 32.3 G/DL (ref 31–36)
MCV RBC AUTO: 93.3 FL (ref 80–100)
MONOCYTES ABSOLUTE: 0.4 K/UL (ref 0–1.3)
MONOCYTES RELATIVE PERCENT: 4.8 %
NEUTROPHILS ABSOLUTE: 3.8 K/UL (ref 1.7–7.7)
NEUTROPHILS RELATIVE PERCENT: 44.8 %
PDW BLD-RTO: 14.2 % (ref 12.4–15.4)
PERFORMED ON: ABNORMAL
PERFORMED ON: ABNORMAL
PHOSPHORUS: 2.4 MG/DL (ref 2.5–4.9)
PLATELET # BLD: 266 K/UL (ref 135–450)
PMV BLD AUTO: 9.5 FL (ref 5–10.5)
POTASSIUM SERPL-SCNC: 2.9 MMOL/L (ref 3.5–5.1)
POTASSIUM SERPL-SCNC: 3.5 MMOL/L (ref 3.5–5.1)
PRO-BNP: 952 PG/ML (ref 0–124)
RBC # BLD: 4.66 M/UL (ref 4–5.2)
SODIUM BLD-SCNC: 141 MMOL/L (ref 136–145)
SODIUM BLD-SCNC: 144 MMOL/L (ref 136–145)
TROPONIN: 0.24 NG/ML
TROPONIN: 0.29 NG/ML
TROPONIN: <0.01 NG/ML
TSH REFLEX: 1.43 UIU/ML (ref 0.27–4.2)
WBC # BLD: 8.4 K/UL (ref 4–11)

## 2019-07-13 PROCEDURE — 6370000000 HC RX 637 (ALT 250 FOR IP): Performed by: INTERNAL MEDICINE

## 2019-07-13 PROCEDURE — 2000000000 HC ICU R&B

## 2019-07-13 PROCEDURE — 83735 ASSAY OF MAGNESIUM: CPT

## 2019-07-13 PROCEDURE — 6370000000 HC RX 637 (ALT 250 FOR IP): Performed by: EMERGENCY MEDICINE

## 2019-07-13 PROCEDURE — C1894 INTRO/SHEATH, NON-LASER: HCPCS

## 2019-07-13 PROCEDURE — 6360000002 HC RX W HCPCS: Performed by: INTERNAL MEDICINE

## 2019-07-13 PROCEDURE — C1760 CLOSURE DEV, VASC: HCPCS

## 2019-07-13 PROCEDURE — C1769 GUIDE WIRE: HCPCS

## 2019-07-13 PROCEDURE — 84484 ASSAY OF TROPONIN QUANT: CPT

## 2019-07-13 PROCEDURE — 99255 IP/OBS CONSLTJ NEW/EST HI 80: CPT | Performed by: INTERNAL MEDICINE

## 2019-07-13 PROCEDURE — 93005 ELECTROCARDIOGRAM TRACING: CPT | Performed by: INTERNAL MEDICINE

## 2019-07-13 PROCEDURE — B2151ZZ FLUOROSCOPY OF LEFT HEART USING LOW OSMOLAR CONTRAST: ICD-10-PCS | Performed by: INTERNAL MEDICINE

## 2019-07-13 PROCEDURE — 80069 RENAL FUNCTION PANEL: CPT

## 2019-07-13 PROCEDURE — 94761 N-INVAS EAR/PLS OXIMETRY MLT: CPT

## 2019-07-13 PROCEDURE — 93010 ELECTROCARDIOGRAM REPORT: CPT | Performed by: INTERNAL MEDICINE

## 2019-07-13 PROCEDURE — 36415 COLL VENOUS BLD VENIPUNCTURE: CPT

## 2019-07-13 PROCEDURE — 93458 L HRT ARTERY/VENTRICLE ANGIO: CPT

## 2019-07-13 PROCEDURE — 83605 ASSAY OF LACTIC ACID: CPT

## 2019-07-13 PROCEDURE — 6360000002 HC RX W HCPCS: Performed by: EMERGENCY MEDICINE

## 2019-07-13 PROCEDURE — 4A023N7 MEASUREMENT OF CARDIAC SAMPLING AND PRESSURE, LEFT HEART, PERCUTANEOUS APPROACH: ICD-10-PCS | Performed by: INTERNAL MEDICINE

## 2019-07-13 PROCEDURE — 87040 BLOOD CULTURE FOR BACTERIA: CPT

## 2019-07-13 PROCEDURE — 80061 LIPID PANEL: CPT

## 2019-07-13 PROCEDURE — 94640 AIRWAY INHALATION TREATMENT: CPT

## 2019-07-13 PROCEDURE — 83036 HEMOGLOBIN GLYCOSYLATED A1C: CPT

## 2019-07-13 PROCEDURE — 84443 ASSAY THYROID STIM HORMONE: CPT

## 2019-07-13 PROCEDURE — 2500000003 HC RX 250 WO HCPCS

## 2019-07-13 PROCEDURE — 2580000003 HC RX 258: Performed by: INTERNAL MEDICINE

## 2019-07-13 PROCEDURE — 93458 L HRT ARTERY/VENTRICLE ANGIO: CPT | Performed by: INTERNAL MEDICINE

## 2019-07-13 PROCEDURE — 6360000002 HC RX W HCPCS

## 2019-07-13 PROCEDURE — 85347 COAGULATION TIME ACTIVATED: CPT

## 2019-07-13 PROCEDURE — 2060000000 HC ICU INTERMEDIATE R&B

## 2019-07-13 PROCEDURE — 85025 COMPLETE CBC W/AUTO DIFF WBC: CPT

## 2019-07-13 PROCEDURE — 6370000000 HC RX 637 (ALT 250 FOR IP): Performed by: NURSE PRACTITIONER

## 2019-07-13 PROCEDURE — 99152 MOD SED SAME PHYS/QHP 5/>YRS: CPT | Performed by: INTERNAL MEDICINE

## 2019-07-13 PROCEDURE — 99223 1ST HOSP IP/OBS HIGH 75: CPT | Performed by: INTERNAL MEDICINE

## 2019-07-13 PROCEDURE — 99291 CRITICAL CARE FIRST HOUR: CPT | Performed by: INTERNAL MEDICINE

## 2019-07-13 PROCEDURE — 2709999900 HC NON-CHARGEABLE SUPPLY

## 2019-07-13 PROCEDURE — 6360000004 HC RX CONTRAST MEDICATION

## 2019-07-13 PROCEDURE — 94150 VITAL CAPACITY TEST: CPT

## 2019-07-13 PROCEDURE — 2700000000 HC OXYGEN THERAPY PER DAY

## 2019-07-13 PROCEDURE — 80048 BASIC METABOLIC PNL TOTAL CA: CPT

## 2019-07-13 PROCEDURE — B2111ZZ FLUOROSCOPY OF MULTIPLE CORONARY ARTERIES USING LOW OSMOLAR CONTRAST: ICD-10-PCS | Performed by: INTERNAL MEDICINE

## 2019-07-13 PROCEDURE — 83880 ASSAY OF NATRIURETIC PEPTIDE: CPT

## 2019-07-13 PROCEDURE — 99291 CRITICAL CARE FIRST HOUR: CPT

## 2019-07-13 PROCEDURE — 71045 X-RAY EXAM CHEST 1 VIEW: CPT

## 2019-07-13 RX ORDER — METHYLPREDNISOLONE SODIUM SUCCINATE 125 MG/2ML
40 INJECTION, POWDER, LYOPHILIZED, FOR SOLUTION INTRAMUSCULAR; INTRAVENOUS DAILY
Status: DISCONTINUED | OUTPATIENT
Start: 2019-07-14 | End: 2019-07-14

## 2019-07-13 RX ORDER — PREDNISONE 20 MG/1
60 TABLET ORAL ONCE
Status: COMPLETED | OUTPATIENT
Start: 2019-07-13 | End: 2019-07-13

## 2019-07-13 RX ORDER — ONDANSETRON 2 MG/ML
4 INJECTION INTRAMUSCULAR; INTRAVENOUS EVERY 6 HOURS PRN
Status: DISCONTINUED | OUTPATIENT
Start: 2019-07-13 | End: 2019-07-13 | Stop reason: HOSPADM

## 2019-07-13 RX ORDER — POTASSIUM CHLORIDE 7.45 MG/ML
10 INJECTION INTRAVENOUS PRN
Status: DISCONTINUED | OUTPATIENT
Start: 2019-07-13 | End: 2019-07-13 | Stop reason: HOSPADM

## 2019-07-13 RX ORDER — SODIUM CHLORIDE 0.9 % (FLUSH) 0.9 %
10 SYRINGE (ML) INJECTION PRN
Status: DISCONTINUED | OUTPATIENT
Start: 2019-07-13 | End: 2019-07-13 | Stop reason: HOSPADM

## 2019-07-13 RX ORDER — LIDOCAINE 4 G/G
1 PATCH TOPICAL DAILY
Status: DISCONTINUED | OUTPATIENT
Start: 2019-07-13 | End: 2019-07-16 | Stop reason: HOSPADM

## 2019-07-13 RX ORDER — MIDAZOLAM HYDROCHLORIDE 5 MG/ML
INJECTION INTRAMUSCULAR; INTRAVENOUS
Status: COMPLETED | OUTPATIENT
Start: 2019-07-13 | End: 2019-07-13

## 2019-07-13 RX ORDER — PANTOPRAZOLE SODIUM 40 MG/1
40 TABLET, DELAYED RELEASE ORAL
Status: DISCONTINUED | OUTPATIENT
Start: 2019-07-14 | End: 2019-07-16 | Stop reason: HOSPADM

## 2019-07-13 RX ORDER — ACETAMINOPHEN 500 MG
500 TABLET ORAL EVERY 6 HOURS PRN
Status: DISCONTINUED | OUTPATIENT
Start: 2019-07-13 | End: 2019-07-13 | Stop reason: SDUPTHER

## 2019-07-13 RX ORDER — NICOTINE 21 MG/24HR
1 PATCH, TRANSDERMAL 24 HOURS TRANSDERMAL DAILY
Status: DISCONTINUED | OUTPATIENT
Start: 2019-07-13 | End: 2019-07-13 | Stop reason: HOSPADM

## 2019-07-13 RX ORDER — FENTANYL CITRATE 50 UG/ML
INJECTION, SOLUTION INTRAMUSCULAR; INTRAVENOUS
Status: COMPLETED | OUTPATIENT
Start: 2019-07-13 | End: 2019-07-13

## 2019-07-13 RX ORDER — NICOTINE 21 MG/24HR
1 PATCH, TRANSDERMAL 24 HOURS TRANSDERMAL DAILY
Status: DISCONTINUED | OUTPATIENT
Start: 2019-07-14 | End: 2019-07-16 | Stop reason: HOSPADM

## 2019-07-13 RX ORDER — SODIUM CHLORIDE 0.9 % (FLUSH) 0.9 %
10 SYRINGE (ML) INJECTION PRN
Status: DISCONTINUED | OUTPATIENT
Start: 2019-07-13 | End: 2019-07-13 | Stop reason: SDUPTHER

## 2019-07-13 RX ORDER — POTASSIUM CHLORIDE 20 MEQ/1
40 TABLET, EXTENDED RELEASE ORAL 3 TIMES DAILY
Status: COMPLETED | OUTPATIENT
Start: 2019-07-13 | End: 2019-07-14

## 2019-07-13 RX ORDER — PHENAZOPYRIDINE HYDROCHLORIDE 100 MG/1
100 TABLET, FILM COATED ORAL 2 TIMES DAILY WITH MEALS
Status: COMPLETED | OUTPATIENT
Start: 2019-07-13 | End: 2019-07-15

## 2019-07-13 RX ORDER — CARVEDILOL 6.25 MG/1
6.25 TABLET ORAL 2 TIMES DAILY WITH MEALS
Status: DISCONTINUED | OUTPATIENT
Start: 2019-07-13 | End: 2019-07-16 | Stop reason: HOSPADM

## 2019-07-13 RX ORDER — METHYLPREDNISOLONE SODIUM SUCCINATE 125 MG/2ML
40 INJECTION, POWDER, LYOPHILIZED, FOR SOLUTION INTRAMUSCULAR; INTRAVENOUS EVERY 12 HOURS
Status: DISCONTINUED | OUTPATIENT
Start: 2019-07-14 | End: 2019-07-13

## 2019-07-13 RX ORDER — CARVEDILOL 6.25 MG/1
6.25 TABLET ORAL 2 TIMES DAILY WITH MEALS
Status: DISCONTINUED | OUTPATIENT
Start: 2019-07-14 | End: 2019-07-13

## 2019-07-13 RX ORDER — FUROSEMIDE 10 MG/ML
40 INJECTION INTRAMUSCULAR; INTRAVENOUS 2 TIMES DAILY
Status: DISCONTINUED | OUTPATIENT
Start: 2019-07-13 | End: 2019-07-13 | Stop reason: HOSPADM

## 2019-07-13 RX ORDER — ONDANSETRON 2 MG/ML
4 INJECTION INTRAMUSCULAR; INTRAVENOUS EVERY 6 HOURS PRN
Status: DISCONTINUED | OUTPATIENT
Start: 2019-07-13 | End: 2019-07-13 | Stop reason: SDUPTHER

## 2019-07-13 RX ORDER — CARVEDILOL 6.25 MG/1
6.25 TABLET ORAL 2 TIMES DAILY WITH MEALS
Status: DISCONTINUED | OUTPATIENT
Start: 2019-07-13 | End: 2019-07-13 | Stop reason: HOSPADM

## 2019-07-13 RX ORDER — ATORVASTATIN CALCIUM 40 MG/1
40 TABLET, FILM COATED ORAL DAILY
Status: DISCONTINUED | OUTPATIENT
Start: 2019-07-13 | End: 2019-07-13

## 2019-07-13 RX ORDER — ASPIRIN 325 MG
TABLET ORAL
Status: DISCONTINUED
Start: 2019-07-13 | End: 2019-07-13 | Stop reason: HOSPADM

## 2019-07-13 RX ORDER — ATORVASTATIN CALCIUM 80 MG/1
80 TABLET, FILM COATED ORAL NIGHTLY
Status: DISCONTINUED | OUTPATIENT
Start: 2019-07-13 | End: 2019-07-16 | Stop reason: HOSPADM

## 2019-07-13 RX ORDER — MAGNESIUM SULFATE IN WATER 40 MG/ML
2 INJECTION, SOLUTION INTRAVENOUS ONCE
Status: DISCONTINUED | OUTPATIENT
Start: 2019-07-13 | End: 2019-07-13

## 2019-07-13 RX ORDER — POTASSIUM CHLORIDE 20 MEQ/1
40 TABLET, EXTENDED RELEASE ORAL PRN
Status: DISCONTINUED | OUTPATIENT
Start: 2019-07-13 | End: 2019-07-13 | Stop reason: HOSPADM

## 2019-07-13 RX ORDER — IPRATROPIUM BROMIDE AND ALBUTEROL SULFATE 2.5; .5 MG/3ML; MG/3ML
1 SOLUTION RESPIRATORY (INHALATION) ONCE
Status: COMPLETED | OUTPATIENT
Start: 2019-07-13 | End: 2019-07-13

## 2019-07-13 RX ORDER — ATORVASTATIN CALCIUM 10 MG/1
10 TABLET, FILM COATED ORAL DAILY
Status: DISCONTINUED | OUTPATIENT
Start: 2019-07-13 | End: 2019-07-13

## 2019-07-13 RX ORDER — HEPARIN SODIUM 1000 [USP'U]/ML
60 INJECTION, SOLUTION INTRAVENOUS; SUBCUTANEOUS ONCE
Status: COMPLETED | OUTPATIENT
Start: 2019-07-13 | End: 2019-07-13

## 2019-07-13 RX ORDER — FLUOXETINE HYDROCHLORIDE 20 MG/1
20 CAPSULE ORAL DAILY
Status: DISCONTINUED | OUTPATIENT
Start: 2019-07-13 | End: 2019-07-13 | Stop reason: HOSPADM

## 2019-07-13 RX ORDER — ASPIRIN 81 MG/1
81 TABLET, CHEWABLE ORAL DAILY
Status: DISCONTINUED | OUTPATIENT
Start: 2019-07-13 | End: 2019-07-13

## 2019-07-13 RX ORDER — LISINOPRIL 10 MG/1
10 TABLET ORAL 2 TIMES DAILY
Status: DISCONTINUED | OUTPATIENT
Start: 2019-07-13 | End: 2019-07-14 | Stop reason: SDUPTHER

## 2019-07-13 RX ORDER — MAGNESIUM SULFATE 1 G/100ML
1 INJECTION INTRAVENOUS PRN
Status: DISCONTINUED | OUTPATIENT
Start: 2019-07-13 | End: 2019-07-13 | Stop reason: HOSPADM

## 2019-07-13 RX ORDER — SODIUM CHLORIDE 0.9 % (FLUSH) 0.9 %
10 SYRINGE (ML) INJECTION EVERY 12 HOURS SCHEDULED
Status: DISCONTINUED | OUTPATIENT
Start: 2019-07-13 | End: 2019-07-13 | Stop reason: HOSPADM

## 2019-07-13 RX ORDER — ALBUTEROL SULFATE 90 UG/1
2 AEROSOL, METERED RESPIRATORY (INHALATION) EVERY 6 HOURS PRN
Status: CANCELLED | OUTPATIENT
Start: 2019-07-13

## 2019-07-13 RX ORDER — SODIUM CHLORIDE 0.9 % (FLUSH) 0.9 %
10 SYRINGE (ML) INJECTION PRN
Status: DISCONTINUED | OUTPATIENT
Start: 2019-07-13 | End: 2019-07-16 | Stop reason: HOSPADM

## 2019-07-13 RX ORDER — SODIUM CHLORIDE 0.9 % (FLUSH) 0.9 %
10 SYRINGE (ML) INJECTION EVERY 12 HOURS SCHEDULED
Status: DISCONTINUED | OUTPATIENT
Start: 2019-07-13 | End: 2019-07-13 | Stop reason: SDUPTHER

## 2019-07-13 RX ORDER — ACETAMINOPHEN 500 MG
500 TABLET ORAL EVERY 6 HOURS PRN
Status: DISCONTINUED | OUTPATIENT
Start: 2019-07-13 | End: 2019-07-13 | Stop reason: HOSPADM

## 2019-07-13 RX ORDER — FUROSEMIDE 10 MG/ML
40 INJECTION INTRAMUSCULAR; INTRAVENOUS ONCE
Status: COMPLETED | OUTPATIENT
Start: 2019-07-13 | End: 2019-07-13

## 2019-07-13 RX ORDER — ATORVASTATIN CALCIUM 40 MG/1
80 TABLET, FILM COATED ORAL NIGHTLY
Status: DISCONTINUED | OUTPATIENT
Start: 2019-07-13 | End: 2019-07-13 | Stop reason: HOSPADM

## 2019-07-13 RX ORDER — ASPIRIN 81 MG/1
324 TABLET, CHEWABLE ORAL DAILY
Status: DISCONTINUED | OUTPATIENT
Start: 2019-07-14 | End: 2019-07-13 | Stop reason: HOSPADM

## 2019-07-13 RX ORDER — LANSOPRAZOLE 30 MG/1
30 CAPSULE, DELAYED RELEASE ORAL
Status: CANCELLED | OUTPATIENT
Start: 2019-07-14

## 2019-07-13 RX ORDER — IPRATROPIUM BROMIDE AND ALBUTEROL SULFATE 2.5; .5 MG/3ML; MG/3ML
1 SOLUTION RESPIRATORY (INHALATION)
Status: DISCONTINUED | OUTPATIENT
Start: 2019-07-13 | End: 2019-07-14

## 2019-07-13 RX ORDER — MAGNESIUM SULFATE 1 G/100ML
1 INJECTION INTRAVENOUS PRN
Status: DISCONTINUED | OUTPATIENT
Start: 2019-07-13 | End: 2019-07-16 | Stop reason: HOSPADM

## 2019-07-13 RX ORDER — POTASSIUM CHLORIDE 7.45 MG/ML
10 INJECTION INTRAVENOUS PRN
Status: DISCONTINUED | OUTPATIENT
Start: 2019-07-13 | End: 2019-07-16 | Stop reason: HOSPADM

## 2019-07-13 RX ORDER — ASPIRIN 81 MG/1
324 TABLET, CHEWABLE ORAL DAILY
Status: DISCONTINUED | OUTPATIENT
Start: 2019-07-14 | End: 2019-07-13

## 2019-07-13 RX ORDER — LISINOPRIL 10 MG/1
10 TABLET ORAL 2 TIMES DAILY
Status: DISCONTINUED | OUTPATIENT
Start: 2019-07-13 | End: 2019-07-13 | Stop reason: SDUPTHER

## 2019-07-13 RX ORDER — ACETAMINOPHEN 325 MG/1
650 TABLET ORAL EVERY 4 HOURS PRN
Status: DISCONTINUED | OUTPATIENT
Start: 2019-07-13 | End: 2019-07-16 | Stop reason: HOSPADM

## 2019-07-13 RX ORDER — SODIUM CHLORIDE 0.9 % (FLUSH) 0.9 %
10 SYRINGE (ML) INJECTION EVERY 12 HOURS SCHEDULED
Status: DISCONTINUED | OUTPATIENT
Start: 2019-07-13 | End: 2019-07-16 | Stop reason: HOSPADM

## 2019-07-13 RX ORDER — FUROSEMIDE 10 MG/ML
40 INJECTION INTRAMUSCULAR; INTRAVENOUS EVERY 8 HOURS
Status: DISCONTINUED | OUTPATIENT
Start: 2019-07-13 | End: 2019-07-13

## 2019-07-13 RX ORDER — FLUOXETINE HYDROCHLORIDE 20 MG/1
20 CAPSULE ORAL DAILY
Status: DISCONTINUED | OUTPATIENT
Start: 2019-07-14 | End: 2019-07-16 | Stop reason: HOSPADM

## 2019-07-13 RX ORDER — ONDANSETRON 2 MG/ML
INJECTION INTRAMUSCULAR; INTRAVENOUS
Status: COMPLETED | OUTPATIENT
Start: 2019-07-13 | End: 2019-07-13

## 2019-07-13 RX ORDER — METHYLPREDNISOLONE SODIUM SUCCINATE 40 MG/ML
40 INJECTION, POWDER, LYOPHILIZED, FOR SOLUTION INTRAMUSCULAR; INTRAVENOUS EVERY 12 HOURS
Status: DISCONTINUED | OUTPATIENT
Start: 2019-07-13 | End: 2019-07-13 | Stop reason: HOSPADM

## 2019-07-13 RX ORDER — IPRATROPIUM BROMIDE AND ALBUTEROL SULFATE 2.5; .5 MG/3ML; MG/3ML
1 SOLUTION RESPIRATORY (INHALATION)
Status: DISCONTINUED | OUTPATIENT
Start: 2019-07-13 | End: 2019-07-13 | Stop reason: HOSPADM

## 2019-07-13 RX ORDER — ONDANSETRON 2 MG/ML
4 INJECTION INTRAMUSCULAR; INTRAVENOUS EVERY 6 HOURS PRN
Status: DISCONTINUED | OUTPATIENT
Start: 2019-07-13 | End: 2019-07-16 | Stop reason: HOSPADM

## 2019-07-13 RX ORDER — PANTOPRAZOLE SODIUM 40 MG/1
40 TABLET, DELAYED RELEASE ORAL
Status: DISCONTINUED | OUTPATIENT
Start: 2019-07-14 | End: 2019-07-13 | Stop reason: HOSPADM

## 2019-07-13 RX ORDER — FUROSEMIDE 10 MG/ML
40 INJECTION INTRAMUSCULAR; INTRAVENOUS 2 TIMES DAILY
Status: DISCONTINUED | OUTPATIENT
Start: 2019-07-13 | End: 2019-07-15

## 2019-07-13 RX ORDER — LISINOPRIL AND HYDROCHLOROTHIAZIDE 12.5; 1 MG/1; MG/1
1 TABLET ORAL DAILY
Status: DISCONTINUED | OUTPATIENT
Start: 2019-07-13 | End: 2019-07-13 | Stop reason: HOSPADM

## 2019-07-13 RX ORDER — LABETALOL HYDROCHLORIDE 5 MG/ML
INJECTION, SOLUTION INTRAVENOUS
Status: COMPLETED | OUTPATIENT
Start: 2019-07-13 | End: 2019-07-13

## 2019-07-13 RX ORDER — POTASSIUM CHLORIDE 20 MEQ/1
40 TABLET, EXTENDED RELEASE ORAL PRN
Status: DISCONTINUED | OUTPATIENT
Start: 2019-07-13 | End: 2019-07-16 | Stop reason: HOSPADM

## 2019-07-13 RX ORDER — LISINOPRIL AND HYDROCHLOROTHIAZIDE 12.5; 1 MG/1; MG/1
1 TABLET ORAL DAILY
Status: DISCONTINUED | OUTPATIENT
Start: 2019-07-14 | End: 2019-07-13

## 2019-07-13 RX ADMIN — IPRATROPIUM BROMIDE AND ALBUTEROL SULFATE 1 AMPULE: .5; 3 SOLUTION RESPIRATORY (INHALATION) at 12:51

## 2019-07-13 RX ADMIN — FUROSEMIDE 40 MG: 10 INJECTION, SOLUTION INTRAMUSCULAR; INTRAVENOUS at 05:24

## 2019-07-13 RX ADMIN — CARVEDILOL 6.25 MG: 6.25 TABLET, FILM COATED ORAL at 13:14

## 2019-07-13 RX ADMIN — FENTANYL CITRATE 25 MCG: 50 INJECTION, SOLUTION INTRAMUSCULAR; INTRAVENOUS at 14:08

## 2019-07-13 RX ADMIN — MIDAZOLAM HYDROCHLORIDE 2 MG: 5 INJECTION INTRAMUSCULAR; INTRAVENOUS at 14:08

## 2019-07-13 RX ADMIN — IPRATROPIUM BROMIDE AND ALBUTEROL SULFATE 1 AMPULE: .5; 3 SOLUTION RESPIRATORY (INHALATION) at 04:27

## 2019-07-13 RX ADMIN — TICAGRELOR 180 MG: 90 TABLET ORAL at 13:08

## 2019-07-13 RX ADMIN — HEPARIN SODIUM 5440 UNITS: 1000 INJECTION INTRAVENOUS; SUBCUTANEOUS at 13:08

## 2019-07-13 RX ADMIN — CARVEDILOL 6.25 MG: 6.25 TABLET, FILM COATED ORAL at 16:22

## 2019-07-13 RX ADMIN — PHENAZOPYRIDINE HYDROCHLORIDE 100 MG: 100 TABLET ORAL at 17:00

## 2019-07-13 RX ADMIN — POTASSIUM CHLORIDE 40 MEQ: 20 TABLET, EXTENDED RELEASE ORAL at 20:45

## 2019-07-13 RX ADMIN — POTASSIUM CHLORIDE 10 MEQ: 10 INJECTION, SOLUTION INTRAVENOUS at 18:37

## 2019-07-13 RX ADMIN — FUROSEMIDE 40 MG: 10 INJECTION, SOLUTION INTRAMUSCULAR; INTRAVENOUS at 18:06

## 2019-07-13 RX ADMIN — Medication 10 ML: at 20:46

## 2019-07-13 RX ADMIN — METHYLPREDNISOLONE SODIUM SUCCINATE 40 MG: 40 INJECTION, POWDER, FOR SOLUTION INTRAMUSCULAR; INTRAVENOUS at 13:02

## 2019-07-13 RX ADMIN — NITROGLYCERIN 1 INCH: 20 OINTMENT TOPICAL at 18:19

## 2019-07-13 RX ADMIN — MAGNESIUM GLUCONATE 500 MG ORAL TABLET 400 MG: 500 TABLET ORAL at 22:09

## 2019-07-13 RX ADMIN — ONDANSETRON 4 MG: 2 INJECTION INTRAMUSCULAR; INTRAVENOUS at 14:11

## 2019-07-13 RX ADMIN — IPRATROPIUM BROMIDE AND ALBUTEROL SULFATE 1 AMPULE: .5; 3 SOLUTION RESPIRATORY (INHALATION) at 15:43

## 2019-07-13 RX ADMIN — ATORVASTATIN CALCIUM 80 MG: 80 TABLET, FILM COATED ORAL at 20:45

## 2019-07-13 RX ADMIN — NITROGLYCERIN 1 INCH: 20 OINTMENT TOPICAL at 13:08

## 2019-07-13 RX ADMIN — PREDNISONE 60 MG: 20 TABLET ORAL at 04:27

## 2019-07-13 RX ADMIN — FUROSEMIDE 40 MG: 10 INJECTION, SOLUTION INTRAMUSCULAR; INTRAVENOUS at 13:03

## 2019-07-13 RX ADMIN — IPRATROPIUM BROMIDE AND ALBUTEROL SULFATE 1 AMPULE: .5; 3 SOLUTION RESPIRATORY (INHALATION) at 19:22

## 2019-07-13 RX ADMIN — LABETALOL HYDROCHLORIDE 10 MG: 5 INJECTION, SOLUTION INTRAVENOUS at 14:11

## 2019-07-13 ASSESSMENT — ENCOUNTER SYMPTOMS
SHORTNESS OF BREATH: 1
NAUSEA: 0
FACIAL SWELLING: 0
ABDOMINAL PAIN: 0
VOICE CHANGE: 0
COUGH: 1
STRIDOR: 0
VOMITING: 0
COLOR CHANGE: 0
TROUBLE SWALLOWING: 0
WHEEZING: 0

## 2019-07-13 ASSESSMENT — PAIN DESCRIPTION - FREQUENCY: FREQUENCY: CONTINUOUS

## 2019-07-13 ASSESSMENT — PAIN DESCRIPTION - DESCRIPTORS: DESCRIPTORS: BURNING

## 2019-07-13 ASSESSMENT — PAIN DESCRIPTION - ORIENTATION: ORIENTATION: UPPER

## 2019-07-13 ASSESSMENT — PAIN DESCRIPTION - LOCATION: LOCATION: CHEST

## 2019-07-13 NOTE — CONSULTS
Consult dictated # [id number]
[address]                 [address] [facility]                                  CONSULTATION    PATIENT NAME: Sloane Hicks                    :        1965  MED REC NO:   [id number]                          ROOM:       4178  ACCOUNT NO:   [de-identified]                           ADMIT DATE: 2019  PROVIDER:     Willy Garcia MD    CARDIOLOGY CONSULTATION    DATE OF CONSULTATION:  2019    REASON FOR CONSULTATION:  Shortness of breath. HISTORY OF PRESENT ILLNESS:  The patient is a 50-year-old woman with  history of asthma, diabetes mellitus, and hypertension, who is admitted  for progressive shortness of breath. She reports a several-day history  of worsening shortness of breath and lower extremity edema. She has no  known cardiovascular history and reports having had a stress test  performed several years ago. There was evidence of a stress echo from  , but the result is not currently available in the Epic. She has  not had episodes of chest pain. She has not undergone any other  cardiology diagnostic testing. ECG telemetry monitoring demonstrates  sinus rhythm. ECG and ECHO are pending. Her proBNP level is elevated  at 952. Chest radiography does demonstrate pulmonary vascular  congestion. She has received IV diuretics and reports some symptomatic  improvement. Her intake and outputs have not been recorded. PAST MEDICAL HISTORY:  1. Asthma. 2.  Diabetes mellitus. 3.  Hypertension. 4.  Obstructive sleep apnea. FAMILY HISTORY:  Remarkable for hypertension in her mother. There is  also a history of hypertension and cancer in the father. There is no  known family history of cardiac rhythm disturbance, syncope, or sudden  cardiac death. SOCIAL HISTORY:  The patient is a current everyday cigarette smoker for  40 years. She does consume alcohol on an occasional basis.     MEDICATIONS AT THE TIME OF
Psych: Oriented x 3. No anxiety. LABS:  CBC:   Recent Labs     07/13/19  0355   WBC 8.4   HGB 14.1   HCT 43.5   MCV 93.3        BMP:   Recent Labs     07/13/19  0355      K 3.5      CO2 22   BUN 13   CREATININE 1.2*     LIVER PROFILE: No results for input(s): AST, ALT, LIPASE, BILIDIR, BILITOT, ALKPHOS in the last 72 hours. Invalid input(s): AMYLASE,  ALB  PT/INR: No results for input(s): PROTIME, INR in the last 72 hours. APTT: No results for input(s): APTT in the last 72 hours. UA:No results for input(s): NITRITE, COLORU, PHUR, LABCAST, WBCUA, RBCUA, MUCUS, TRICHOMONAS, YEAST, BACTERIA, CLARITYU, SPECGRAV, LEUKOCYTESUR, UROBILINOGEN, BILIRUBINUR, BLOODU, GLUCOSEU, AMORPHOUS in the last 72 hours. Invalid input(s): KETONESU  No results for input(s): PHART, YHD0FXH, PO2ART in the last 72 hours.     CXR 7/13 imaging was reviewed by me and showed Pulmonary edema     ASSESSMENT:  · Acute hypoxemic respiratory failure  · Acute NSTEMI-T wave inversion in septal leads with elevated troponin  · Acute pulmonary edema  · Asthma/COPD with acute exacerbation  · Hemoptysis x1  · 40 pack year history of smoking    PLAN:  Transfer to ICU   Aspirin, statin, beta-blocker, therapeutic dose Lovenox  Echocardiogram and follow troponin  Lasix IV  Discussed with cardiology  Noninvasive positive pressure ventilation per my orders PRN   Supplemental oxygen to maintain SaO2 >92%; wean as tolerated  Inhaled bronchodilator therapy  Short course of prednisone  Doxycycline for 5 days  Smoking cessation counseling  DVT prophylaxis: Lovenox therapeutic dose  MRSA prophylaxis: Bactroban

## 2019-07-13 NOTE — POST SEDATION
Patient:  Fidel Quiñones   :   1965    A pre-sedation re-evaluation was performed immediately prior to beginning of  the procedure. Procedure: cardiac cath  Medications: Procedural sedation with minimal conscious sedation  Complications: None  Estimated Blood Loss: none  Specimens: Were not obtained        Aide Medication and Procedural Reconciliation:  I agree that the documented medications and procedures performed are true. The medications were given under my order. The procedures were performed under my direct supervision.

## 2019-07-13 NOTE — ED PROVIDER NOTES
[address] [facility]  eMERGENCY dEPARTMENT eNCOUnter      Pt Name: Deidra Bella  MRN: [id number]  [place] 1965  Date of evaluation: 7/13/2019  Provider: Renetta Felix MD    CHIEF COMPLAINT       Chief Complaint   Patient presents with    Shortness of Breath     FOR MONTHS. WORSENING TONIGHT. PRODUCTIVE COUGH. HISTORY OF PRESENT ILLNESS   (Location/Symptom, Timing/Onset, Context/Setting, Quality, Duration, Modifying Factors, Severity)  Note limiting factors. Deidra Bella is a 53 y.o. female with hx of hypertension and diabetes but denies any history of coronary artery disease or CHF who presents with months of slowly progressive shortness of breath, cough productive of clear sputum, and intermittent leg swelling. Patient reports shortness of breath significant he worsened today causing her to call 911. The patient was hypoxic to 86% on room air when EMS showed up. Patient denies any chest pain or hemoptysis. She does report worsening shortness of breath, cough, sputum production, and increased work of breathing. She reports her symptoms are severe, constant, worsening. HPI    Nursing Notes were reviewed. REVIEW OFSYSTEMS    (2-9 systems for level 4, 10 or more for level 5)     Review of Systems   Constitutional: Negative for appetite change, fever and unexpected weight change. HENT: Negative for facial swelling, trouble swallowing and voice change. Eyes: Negative for visual disturbance. Respiratory: Positive for cough and shortness of breath. Negative for wheezing and stridor. Cardiovascular: Positive for leg swelling. Negative for chest pain and palpitations. Gastrointestinal: Negative for abdominal pain, nausea and vomiting. Genitourinary: Negative for dysuria and vaginal bleeding. Musculoskeletal: Negative for neck pain and neck stiffness. Skin: Negative for color change and wound. Neurological: Negative for seizures and syncope. tablet by mouth daily as needed for Pain       ALLERGIES     Morphine    FAMILY HISTORY       Family History   Problem Relation Age of Onset    High Blood Pressure Mother     Cancer Father         lung    High Blood Pressure Father           SOCIAL HISTORY       Social History     Socioeconomic History    Marital status:      Spouse name: None    Number of children: None    Years of education: None    Highest education level: None   Occupational History    None   Social Needs    Financial resource strain: None    Food insecurity:     Worry: None     Inability: None    Transportation needs:     Medical: None     Non-medical: None   Tobacco Use    Smoking status: Current Every Day Smoker     Packs/day: 0.50     Years: 40.00     Pack years: 20.00     Types: Cigarettes    Smokeless tobacco: Never Used   Substance and Sexual Activity    Alcohol use: Yes     Comment: 6 X per year    Drug use: Yes     Frequency: 1.0 times per week     Types: Marijuana     Comment: none since 2014    Sexual activity: Yes     Partners: Male   Lifestyle    Physical activity:     Days per week: None     Minutes per session: None    Stress: None   Relationships    Social connections:     Talks on phone: None     Gets together: None     Attends Yarsanism service: None     Active member of club or organization: None     Attends meetings of clubs or organizations: None     Relationship status: None    Intimate partner violence:     Fear of current or ex partner: None     Emotionally abused: None     Physically abused: None     Forced sexual activity: None   Other Topics Concern    None   Social History Narrative    None         PHYSICAL EXAM    (up to 7 for level 4, 8 or more for level 5)     ED Triage Vitals [07/13/19 0406]   BP Temp Temp Source Pulse Resp SpO2 Height Weight   (!) 161/102 98.2 °F (36.8 °C) Oral 103 (!) 35 93 % 5' 4\" (1.626 m) 200 lb (90.7 kg)       Physical Exam   Constitutional: She appears

## 2019-07-13 NOTE — ED NOTES
STATES PATIENT HAS NOT BEEN ABLE TO REST FOR THE LAST FEW WEEKS. PATIENT REPORTS PRODUCTIVE COUGH WITH GREEN THIS SPUTUM.  STATES SHE HAS STARTED SWELLING IN THE FACE, LEGS, AND FEET HAVE BEEN THE WORST.      Philly Araya RN  07/13/19 4029

## 2019-07-13 NOTE — PROGRESS NOTES
4 Eyes Skin Assessment     The patient is being assess for   Admission/ transfer    I agree that 2 RN's have performed a thorough Head to Toe Skin Assessment on the patient. ALL assessment sites listed below have been assessed. Areas assessed by both nurses:   [x]   Head, Face, and Ears   [x]   Shoulders, Back, and Chest, Abdomen  [x]   Arms, Elbows, and Hands   [x]   Coccyx, Sacrum, and Ischium  [x]   Legs, Feet, and Heels        Scattered bruising. No other skin issues    **SHARE this note so that the co-signing nurse is able to place an eSignature**    Co-signer eSignature: Electronically signed by Alla Alaniz RN on 7/13/19 at 12:09 PM    Does the Patient have Skin Breakdown?   No          Anthony Prevention initiated:  No   Wound Care Orders initiated:  No      [facility] nurse consulted for Pressure Injury (Stage 3,4, Unstageable, DTI, NWPT, Complex wounds)and New or Established Ostomies:  No      Primary Nurse eSignature: Electronically signed by Remedios Garcia RN on 7/13/19 at 11:59 AM

## 2019-07-13 NOTE — CONSULTS
[address], [place] [address]                                  CONSULTATION    PATIENT NAME: Jennifer Alarcon                    :        1965  MED REC NO:   [id number]                          ROOM:  ACCOUNT NO:   [de-identified]                           ADMIT DATE: 2019  PROVIDER:     Josemanuel Prieto MD    CONSULT DATE:  2019    CARDIOLOGY CONSULTATION/CRITICAL CARE NOTE    HISTORY OF PRESENT ILLNESS:  A 53-year-old female presented to the [facility] with complaints of shortness of breath and cough. She  subsequently had abnormal elevated troponin and an abnormal EKG for  which interventional cardiology consultation was requested. I reviewed  the EKG. The patient was having persistent chest pain and therefore she  was transferred emergently to [facility] for further  evaluation. Upon arrival here, the patient was having persistent chest  pain. The pain started several hours ago. It is a pressure sensation  in the center of her chest.  She has associated shortness of breath. She never had it before. It was severe, very little improvement with  treatment, nonradiating. PAST MEDICAL HISTORY:  1. Hypertension. 2.  Hyperlipidemia. 3.  Diabetes. SOCIAL HISTORY:  She smokes. FAMILY HISTORY:  Positive for heart disease. REVIEW OF SYSTEMS:  She denies fevers, chills. She has cough,  productive. No focal neurological symptoms. No headache. No visual  changes. No recent GI or  bleeding. No recent or upcoming surgeries. All other systems are negative except as present illness. ALLERGIES:  MORPHINE. MEDICATIONS:  See list in the chart, which I have reviewed. PHYSICAL EXAMINATION:  VITAL SIGNS:  Blood pressure is 157/90, heart rate 92, respirations 14,  98% saturated on oxygen. GENERAL:  Well-developed, well-nourished white female, in no acute  distress.   HEENT: Normocephalic and atraumatic. Oropharynx clear. Moist mucous  membranes. NECK:  Supple. CHEST:  Rales. CARDIAC:  Regular S1, S2. There is no S3 or S4 gallop. There is no  significant murmur. Jugular venous pressure is elevated. ABDOMEN:  Soft and nontender. Positive bowel sounds. EXTREMITIES:  Trace edema. NEUROLOGIC:  Grossly nonfocal.  SKIN:  Warm and dry. PSYCHIATRIC:  Affect calm. EKG, sinus rhythm, anterior ST and T-wave abnormalities suggestive of  acute myocardial ischemia, possible injury. Chest x-ray is consistent  with pulmonary edema. Troponin is 0.24. IMPRESSION:  1. NonST-segment elevation myocardial infarction. 2.  Unstable angina, class IV. 3.  Acute systolic congestive heart failure, unknown type. 4.  Hypertension, suboptimal.  5.  Hyperlipidemia. 6.  Diabetes. 7.  Smoking. RECOMMENDATION:  1. Emergent cardiac catheterization. Risks, benefits, alternatives,  expectations discussed with the patient. She understands, agreed to  proceed. 2.  IV heparin and monitor with continuous lab data. 3.  Smoking cessation discussed.       45 min critical care    Rox Landaverde MD    D: 07/13/2019 14:36:44       T: 07/13/2019 17:42:45     [email or web address]  Job#: [id number]     Doc#: [id number]    CC:

## 2019-07-13 NOTE — H&P
5/22/19  Yes Elida Suárez PA-C   ibuprofen (ADVIL;MOTRIN) 400 MG tablet Take 600 mg by mouth every 6 hours as needed for Pain   Yes Historical Provider, MD   acetaminophen (TYLENOL) 500 MG tablet Take 500 mg by mouth every 6 hours as needed for Pain   Yes Historical Provider, MD   lisinopril-hydrochlorothiazide (PRINZIDE;ZESTORETIC) 10-12.5 MG per tablet Take 1 tablet by mouth daily   Yes Historical Provider, MD   FLUoxetine (PROZAC) 20 MG capsule Take 20 mg by mouth daily   Yes Historical Provider, MD   lansoprazole (PREVACID) 30 MG delayed release capsule Take 30 mg by mouth daily   Yes Historical Provider, MD   albuterol (PROVENTIL HFA;VENTOLIN HFA) 108 (90 BASE) MCG/ACT inhaler Use 2 puffs 4 times daily for 7 days then as needed for wheezing. Dispense with Spacer and instruct in use. At patient's preference may use 60 dose MDI. May Sub Pro-Air or Proventil as needed per insurance. 11/25/15  Yes GABE Crow   atorvastatin (LIPITOR) 10 MG tablet Take 10 mg by mouth daily. Yes Historical Provider, MD       Allergies:  Morphine    Social History:  The patient currently lives at home    TOBACCO:   reports that she has been smoking cigarettes. She has a 20.00 pack-year smoking history. She has never used smokeless tobacco.  ETOH:   reports that she drinks alcohol. Family History:  Reviewed in detail and negative for DM, Early CAD, Cancer, CVA. Positive as follows:        Problem Relation Age of Onset    High Blood Pressure Mother     Cancer Father         lung    High Blood Pressure Father        REVIEW OF SYSTEMS:   Positive for SOB and as noted in the HPI. All other systems reviewed and negative. PHYSICAL EXAM:    BP (!) 159/107   Pulse 99   Temp 97.8 °F (36.6 °C) (Oral)   Resp 19   Ht 5' 4\" (1.626 m)   Wt 200 lb (90.7 kg)   LMP 05/29/2013   SpO2 100%   BMI 34.33 kg/m²     General appearance: anxious  HEENT Normal cephalic, atraumatic without obvious deformity.   Pupils equal,

## 2019-07-13 NOTE — CONSULTS
[id number]    NSTEMI  [place] 4  Acute CHF  HTN  HLD  DM  Smoker    Emergent cath  IV heparin    45 min critical care

## 2019-07-13 NOTE — PROGRESS NOTES
7/13/19 @ 15:54 per discussion with ROC MONCADA - no need to call this Cardiac consult with [facility] as they have just come from Cath Lab - okay to close.  Nino Logan

## 2019-07-13 NOTE — PROGRESS NOTES
Transfer of Care report received from [facility]. Pt observed in bed, ICU monitoring initiated. Pt's bed in lowest position, call light within reach,alarm on. Pt denies any needs at this time, Respirations are easy and unlabored on 3L of O2. . Will continue to monitor.

## 2019-07-13 NOTE — PROGRESS NOTES
pt informed me that she has interstitial cystitis and take pyridium at home for this. she is c/o of stingy pain with workman and wants to know if she can get her pyridium back. Dr. Tammie Delatorre has been messaged.

## 2019-07-13 NOTE — PROGRESS NOTES
ARTHROSCOPY Right 09/12/2017    LITHOTRIPSY      X 3    SKIN BIOPSY         Level of Consciousness: Alert, Oriented, and Cooperative = 0    Level of Activity: Walking with assistance = 1    Respiratory Pattern: Regular Pattern; RR 8-20 = 0    Breath Sounds: Absent bilaterally and/or with wheezes = 3    Sputum   ,  , Sputum How Obtained: Spontaneous cough  Cough: Strong, spontaneous, non-productive = 0    Vital Signs   BP (!) 148/85   Pulse 77   Temp 97.8 °F (36.6 °C) (Oral)   Resp 20   Ht 5' 4\" (1.626 m)   Wt 200 lb (90.7 kg)   LMP 05/29/2013   SpO2 96%   BMI 34.33 kg/m²   SPO2 (COPD values may differ): 88-89% on room air or greater than 92% on FiO2 28- 35% = 2    Peak Flow (asthma only): not applicable = 0    RSI: 3-44 = TID (three times daily) and Q4hr PRN for dyspnea        Plan       Goals: MEDICATIONS, medication delivery, mobilize retained secretions, volume expansion and improve oxygenation    Patient/caregiver was educated on the proper method of use for Respiratory Care Devices:  Yes      Level of patient/caregiver understanding able to:   ? Verbalize understanding   ? Demonstrate understanding       ? Teach back        ? Needs reinforcement       ? No available caregiver               ? Other:     Response to education:  Very Good     Is patient being placed on Home Treatment Regimen? No     Does the patient have everything they need prior to discharge? NA     Comments: REVIEWED PT CHART     Plan of Care:change to regimen as ordered for wz    Electronically signed by Eduardo Morrell RCP on 7/13/2019 at 3:48 PM    Respiratory Protocol Guidelines     1. Assessment and treatment by Respiratory Therapy will be initiated for medication and therapeutic interventions upon initiation of aerosolized medication. 2. Physician will be contacted for respiratory rate (RR) greater than 35 breaths per minute.  Therapy will be held for heart rate (HR) greater than 140 beats per minute, pending direction Medications:    1. If the patient lacks prior history of lung disease, is not using inhaled anti-inflammatory medication at home, and lacks wheezing by examination or by history for at least 24 hours, contact physician for possible discontinuation.

## 2019-07-14 ENCOUNTER — APPOINTMENT (OUTPATIENT)
Dept: CT IMAGING | Age: 54
DRG: 192 | End: 2019-07-14
Attending: INTERNAL MEDICINE
Payer: COMMERCIAL

## 2019-07-14 LAB
ANION GAP SERPL CALCULATED.3IONS-SCNC: 11 MMOL/L (ref 3–16)
ANION GAP SERPL CALCULATED.3IONS-SCNC: 15 MMOL/L (ref 3–16)
BASOPHILS ABSOLUTE: 0 K/UL (ref 0–0.2)
BASOPHILS ABSOLUTE: 0.1 K/UL (ref 0–0.2)
BASOPHILS RELATIVE PERCENT: 0.1 %
BASOPHILS RELATIVE PERCENT: 0.6 %
BUN BLDV-MCNC: 16 MG/DL (ref 7–20)
BUN BLDV-MCNC: 20 MG/DL (ref 7–20)
CALCIUM SERPL-MCNC: 10.2 MG/DL (ref 8.3–10.6)
CALCIUM SERPL-MCNC: 9.7 MG/DL (ref 8.3–10.6)
CHLORIDE BLD-SCNC: 93 MMOL/L (ref 99–110)
CHLORIDE BLD-SCNC: 96 MMOL/L (ref 99–110)
CHOLESTEROL, TOTAL: 154 MG/DL (ref 0–199)
CHOLESTEROL, TOTAL: 162 MG/DL (ref 0–199)
CO2: 31 MMOL/L (ref 21–32)
CO2: 35 MMOL/L (ref 21–32)
CREAT SERPL-MCNC: 0.7 MG/DL (ref 0.6–1.1)
CREAT SERPL-MCNC: 0.9 MG/DL (ref 0.6–1.1)
EKG ATRIAL RATE: 85 BPM
EKG ATRIAL RATE: 89 BPM
EKG DIAGNOSIS: NORMAL
EKG DIAGNOSIS: NORMAL
EKG P AXIS: 61 DEGREES
EKG P AXIS: 62 DEGREES
EKG P-R INTERVAL: 134 MS
EKG P-R INTERVAL: 144 MS
EKG Q-T INTERVAL: 452 MS
EKG Q-T INTERVAL: 460 MS
EKG QRS DURATION: 78 MS
EKG QRS DURATION: 90 MS
EKG QTC CALCULATION (BAZETT): 537 MS
EKG QTC CALCULATION (BAZETT): 559 MS
EKG R AXIS: 24 DEGREES
EKG R AXIS: 41 DEGREES
EKG T AXIS: 180 DEGREES
EKG T AXIS: 65 DEGREES
EKG VENTRICULAR RATE: 85 BPM
EKG VENTRICULAR RATE: 89 BPM
EOSINOPHILS ABSOLUTE: 0 K/UL (ref 0–0.6)
EOSINOPHILS ABSOLUTE: 0 K/UL (ref 0–0.6)
EOSINOPHILS RELATIVE PERCENT: 0 %
EOSINOPHILS RELATIVE PERCENT: 0.1 %
ESTIMATED AVERAGE GLUCOSE: 122.6 MG/DL
GFR AFRICAN AMERICAN: >60
GFR AFRICAN AMERICAN: >60
GFR NON-AFRICAN AMERICAN: >60
GFR NON-AFRICAN AMERICAN: >60
GLUCOSE BLD-MCNC: 136 MG/DL (ref 70–99)
GLUCOSE BLD-MCNC: 160 MG/DL (ref 70–99)
GLUCOSE BLD-MCNC: 177 MG/DL (ref 70–99)
HBA1C MFR BLD: 5.9 %
HCG QUALITATIVE: NEGATIVE
HCT VFR BLD CALC: 41 % (ref 36–48)
HCT VFR BLD CALC: 47.3 % (ref 36–48)
HDLC SERPL-MCNC: 39 MG/DL (ref 40–60)
HDLC SERPL-MCNC: 52 MG/DL (ref 40–60)
HEMOGLOBIN: 13.5 G/DL (ref 12–16)
HEMOGLOBIN: 15.5 G/DL (ref 12–16)
INR BLD: 1.12 (ref 0.86–1.14)
LDL CHOLESTEROL CALCULATED: 105 MG/DL
LDL CHOLESTEROL CALCULATED: 88 MG/DL
LYMPHOCYTES ABSOLUTE: 1.6 K/UL (ref 1–5.1)
LYMPHOCYTES ABSOLUTE: 2 K/UL (ref 1–5.1)
LYMPHOCYTES RELATIVE PERCENT: 8.9 %
LYMPHOCYTES RELATIVE PERCENT: 9.7 %
MAGNESIUM: 2.1 MG/DL (ref 1.8–2.4)
MCH RBC QN AUTO: 30 PG (ref 26–34)
MCH RBC QN AUTO: 30 PG (ref 26–34)
MCHC RBC AUTO-ENTMCNC: 32.8 G/DL (ref 31–36)
MCHC RBC AUTO-ENTMCNC: 32.9 G/DL (ref 31–36)
MCV RBC AUTO: 91 FL (ref 80–100)
MCV RBC AUTO: 91.3 FL (ref 80–100)
MONOCYTES ABSOLUTE: 0.5 K/UL (ref 0–1.3)
MONOCYTES ABSOLUTE: 0.9 K/UL (ref 0–1.3)
MONOCYTES RELATIVE PERCENT: 2.3 %
MONOCYTES RELATIVE PERCENT: 5.5 %
NEUTROPHILS ABSOLUTE: 14.4 K/UL (ref 1.7–7.7)
NEUTROPHILS ABSOLUTE: 19.7 K/UL (ref 1.7–7.7)
NEUTROPHILS RELATIVE PERCENT: 84.2 %
NEUTROPHILS RELATIVE PERCENT: 88.6 %
PDW BLD-RTO: 14.1 % (ref 12.4–15.4)
PDW BLD-RTO: 14.1 % (ref 12.4–15.4)
PERFORMED ON: ABNORMAL
PLATELET # BLD: 257 K/UL (ref 135–450)
PLATELET # BLD: 319 K/UL (ref 135–450)
PMV BLD AUTO: 9.4 FL (ref 5–10.5)
PMV BLD AUTO: 9.6 FL (ref 5–10.5)
POTASSIUM SERPL-SCNC: 3.4 MMOL/L (ref 3.5–5.1)
POTASSIUM SERPL-SCNC: 4.5 MMOL/L (ref 3.5–5.1)
PROTHROMBIN TIME: 12.8 SEC (ref 9.8–13)
RBC # BLD: 4.5 M/UL (ref 4–5.2)
RBC # BLD: 5.18 M/UL (ref 4–5.2)
SODIUM BLD-SCNC: 139 MMOL/L (ref 136–145)
SODIUM BLD-SCNC: 142 MMOL/L (ref 136–145)
TRIGL SERPL-MCNC: 68 MG/DL (ref 0–150)
TRIGL SERPL-MCNC: 89 MG/DL (ref 0–150)
TROPONIN: 0.09 NG/ML
VLDLC SERPL CALC-MCNC: 14 MG/DL
VLDLC SERPL CALC-MCNC: 18 MG/DL
WBC # BLD: 17 K/UL (ref 4–11)
WBC # BLD: 22.2 K/UL (ref 4–11)

## 2019-07-14 PROCEDURE — 94761 N-INVAS EAR/PLS OXIMETRY MLT: CPT

## 2019-07-14 PROCEDURE — 6360000002 HC RX W HCPCS: Performed by: INTERNAL MEDICINE

## 2019-07-14 PROCEDURE — 94669 MECHANICAL CHEST WALL OSCILL: CPT

## 2019-07-14 PROCEDURE — 84703 CHORIONIC GONADOTROPIN ASSAY: CPT

## 2019-07-14 PROCEDURE — 6370000000 HC RX 637 (ALT 250 FOR IP): Performed by: INTERNAL MEDICINE

## 2019-07-14 PROCEDURE — 84484 ASSAY OF TROPONIN QUANT: CPT

## 2019-07-14 PROCEDURE — 80048 BASIC METABOLIC PNL TOTAL CA: CPT

## 2019-07-14 PROCEDURE — 83735 ASSAY OF MAGNESIUM: CPT

## 2019-07-14 PROCEDURE — 99233 SBSQ HOSP IP/OBS HIGH 50: CPT | Performed by: INTERNAL MEDICINE

## 2019-07-14 PROCEDURE — 93005 ELECTROCARDIOGRAM TRACING: CPT | Performed by: INTERNAL MEDICINE

## 2019-07-14 PROCEDURE — 94640 AIRWAY INHALATION TREATMENT: CPT

## 2019-07-14 PROCEDURE — 93010 ELECTROCARDIOGRAM REPORT: CPT | Performed by: INTERNAL MEDICINE

## 2019-07-14 PROCEDURE — 2580000003 HC RX 258: Performed by: INTERNAL MEDICINE

## 2019-07-14 PROCEDURE — 2060000000 HC ICU INTERMEDIATE R&B

## 2019-07-14 PROCEDURE — 6370000000 HC RX 637 (ALT 250 FOR IP): Performed by: NURSE PRACTITIONER

## 2019-07-14 PROCEDURE — 85025 COMPLETE CBC W/AUTO DIFF WBC: CPT

## 2019-07-14 PROCEDURE — 36415 COLL VENOUS BLD VENIPUNCTURE: CPT

## 2019-07-14 PROCEDURE — 2700000000 HC OXYGEN THERAPY PER DAY

## 2019-07-14 PROCEDURE — 70450 CT HEAD/BRAIN W/O DYE: CPT

## 2019-07-14 PROCEDURE — 85610 PROTHROMBIN TIME: CPT

## 2019-07-14 PROCEDURE — 80061 LIPID PANEL: CPT

## 2019-07-14 RX ORDER — CALCIUM CARBONATE 200(500)MG
500 TABLET,CHEWABLE ORAL 3 TIMES DAILY PRN
Status: DISCONTINUED | OUTPATIENT
Start: 2019-07-14 | End: 2019-07-16 | Stop reason: HOSPADM

## 2019-07-14 RX ORDER — KETOROLAC TROMETHAMINE 30 MG/ML
INJECTION, SOLUTION INTRAMUSCULAR; INTRAVENOUS
Status: DISPENSED
Start: 2019-07-14 | End: 2019-07-15

## 2019-07-14 RX ORDER — KETOROLAC TROMETHAMINE 30 MG/ML
30 INJECTION, SOLUTION INTRAMUSCULAR; INTRAVENOUS ONCE
Status: COMPLETED | OUTPATIENT
Start: 2019-07-14 | End: 2019-07-14

## 2019-07-14 RX ORDER — LORAZEPAM 2 MG/ML
0.5 INJECTION INTRAMUSCULAR ONCE
Status: COMPLETED | OUTPATIENT
Start: 2019-07-14 | End: 2019-07-14

## 2019-07-14 RX ORDER — PREDNISONE 10 MG/1
40 TABLET ORAL DAILY
Status: DISCONTINUED | OUTPATIENT
Start: 2019-07-14 | End: 2019-07-16 | Stop reason: HOSPADM

## 2019-07-14 RX ORDER — IPRATROPIUM BROMIDE AND ALBUTEROL SULFATE 2.5; .5 MG/3ML; MG/3ML
1 SOLUTION RESPIRATORY (INHALATION) EVERY 4 HOURS
Status: DISCONTINUED | OUTPATIENT
Start: 2019-07-15 | End: 2019-07-16

## 2019-07-14 RX ORDER — LISINOPRIL 10 MG/1
10 TABLET ORAL 2 TIMES DAILY
Status: DISCONTINUED | OUTPATIENT
Start: 2019-07-14 | End: 2019-07-16 | Stop reason: HOSPADM

## 2019-07-14 RX ORDER — LORAZEPAM 2 MG/ML
INJECTION INTRAMUSCULAR
Status: DISPENSED
Start: 2019-07-14 | End: 2019-07-15

## 2019-07-14 RX ADMIN — PANTOPRAZOLE SODIUM 40 MG: 40 TABLET, DELAYED RELEASE ORAL at 05:49

## 2019-07-14 RX ADMIN — METHYLPREDNISOLONE SODIUM SUCCINATE 40 MG: 125 INJECTION, POWDER, FOR SOLUTION INTRAMUSCULAR; INTRAVENOUS at 09:27

## 2019-07-14 RX ADMIN — IPRATROPIUM BROMIDE AND ALBUTEROL SULFATE 1 AMPULE: .5; 3 SOLUTION RESPIRATORY (INHALATION) at 15:45

## 2019-07-14 RX ADMIN — Medication 10 ML: at 09:30

## 2019-07-14 RX ADMIN — MAGNESIUM GLUCONATE 500 MG ORAL TABLET 400 MG: 500 TABLET ORAL at 09:28

## 2019-07-14 RX ADMIN — IPRATROPIUM BROMIDE AND ALBUTEROL SULFATE 1 AMPULE: .5; 3 SOLUTION RESPIRATORY (INHALATION) at 11:57

## 2019-07-14 RX ADMIN — PREDNISONE 40 MG: 10 TABLET ORAL at 15:26

## 2019-07-14 RX ADMIN — FUROSEMIDE 40 MG: 10 INJECTION, SOLUTION INTRAMUSCULAR; INTRAVENOUS at 18:53

## 2019-07-14 RX ADMIN — FLUOXETINE 20 MG: 20 CAPSULE ORAL at 09:28

## 2019-07-14 RX ADMIN — CARVEDILOL 6.25 MG: 6.25 TABLET, FILM COATED ORAL at 08:44

## 2019-07-14 RX ADMIN — ASPIRIN 325 MG: 325 TABLET, DELAYED RELEASE ORAL at 09:27

## 2019-07-14 RX ADMIN — ATORVASTATIN CALCIUM 80 MG: 80 TABLET, FILM COATED ORAL at 20:37

## 2019-07-14 RX ADMIN — Medication 10 ML: at 20:37

## 2019-07-14 RX ADMIN — LISINOPRIL 10 MG: 10 TABLET ORAL at 20:37

## 2019-07-14 RX ADMIN — PHENAZOPYRIDINE HYDROCHLORIDE 100 MG: 100 TABLET ORAL at 17:19

## 2019-07-14 RX ADMIN — FUROSEMIDE 40 MG: 10 INJECTION, SOLUTION INTRAMUSCULAR; INTRAVENOUS at 09:27

## 2019-07-14 RX ADMIN — POTASSIUM CHLORIDE 40 MEQ: 20 TABLET, EXTENDED RELEASE ORAL at 09:28

## 2019-07-14 RX ADMIN — NITROGLYCERIN 1 INCH: 20 OINTMENT TOPICAL at 00:17

## 2019-07-14 RX ADMIN — ACETAMINOPHEN 650 MG: 325 TABLET ORAL at 05:49

## 2019-07-14 RX ADMIN — NITROGLYCERIN 1 INCH: 20 OINTMENT TOPICAL at 05:49

## 2019-07-14 RX ADMIN — IPRATROPIUM BROMIDE AND ALBUTEROL SULFATE 1 AMPULE: .5; 3 SOLUTION RESPIRATORY (INHALATION) at 23:53

## 2019-07-14 RX ADMIN — POTASSIUM CHLORIDE 40 MEQ: 20 TABLET, EXTENDED RELEASE ORAL at 14:22

## 2019-07-14 RX ADMIN — CARVEDILOL 6.25 MG: 6.25 TABLET, FILM COATED ORAL at 17:19

## 2019-07-14 RX ADMIN — PHENAZOPYRIDINE HYDROCHLORIDE 100 MG: 100 TABLET ORAL at 08:44

## 2019-07-14 RX ADMIN — IPRATROPIUM BROMIDE AND ALBUTEROL SULFATE 1 AMPULE: .5; 3 SOLUTION RESPIRATORY (INHALATION) at 06:32

## 2019-07-14 RX ADMIN — LORAZEPAM 0.5 MG: 2 INJECTION, SOLUTION INTRAMUSCULAR; INTRAVENOUS at 13:52

## 2019-07-14 RX ADMIN — MAGNESIUM GLUCONATE 500 MG ORAL TABLET 400 MG: 500 TABLET ORAL at 20:37

## 2019-07-14 RX ADMIN — KETOROLAC TROMETHAMINE 30 MG: 30 INJECTION, SOLUTION INTRAMUSCULAR at 13:53

## 2019-07-14 RX ADMIN — IPRATROPIUM BROMIDE AND ALBUTEROL SULFATE 1 AMPULE: .5; 3 SOLUTION RESPIRATORY (INHALATION) at 20:14

## 2019-07-14 RX ADMIN — LISINOPRIL 10 MG: 10 TABLET ORAL at 10:45

## 2019-07-14 ASSESSMENT — PAIN DESCRIPTION - PAIN TYPE
TYPE: ACUTE PAIN

## 2019-07-14 ASSESSMENT — PAIN SCALES - GENERAL
PAINLEVEL_OUTOF10: 4
PAINLEVEL_OUTOF10: 10
PAINLEVEL_OUTOF10: 6
PAINLEVEL_OUTOF10: 10
PAINLEVEL_OUTOF10: 1

## 2019-07-14 ASSESSMENT — PAIN DESCRIPTION - LOCATION
LOCATION: HEAD
LOCATION: HEAD

## 2019-07-14 NOTE — PROGRESS NOTES
Hospitalist Progress Note      PCP: Baldo Mayen MD    Date of Admission: 7/13/2019    Chief Complaint: transferred from [facility] for chest pain with concern for NSTEMI     Hospital Course: H & P reviewed. transferred from [facility] for chest pain with concern for NSTEMI . Underwent cardiac cath on 7/13 with non obstructive CAD , EF 30%. On diuresis with IV lasix     Subjective:       Pt weaned from 5L 02 to 1L 02 today. Denied any chest pain. Seen earlier and reported generalized cramps/ tingling in her face, extremities and abdomen with  intermittent stiffness in her hands which improved. Denied any dysphagia or focal weakness. K was 3.4 which was replaced. Code stroke was later called by RN as pt reported tingling and numbness in her hands.      Medications:  Reviewed    Infusion Medications   Scheduled Medications    lisinopril  10 mg Oral BID    aspirin  325 mg Oral Daily    carvedilol  6.25 mg Oral BID WC    sodium chloride flush  10 mL Intravenous 2 times per day    atorvastatin  80 mg Oral Nightly    FLUoxetine  20 mg Oral Daily    furosemide  40 mg Intravenous BID    nicotine  1 patch Transdermal Daily    pantoprazole  40 mg Oral QAM AC    ipratropium-albuterol  1 ampule Inhalation Q4H WA    methylPREDNISolone  40 mg Intravenous Daily    lidocaine  1 patch Transdermal Daily    phenazopyridine  100 mg Oral BID WC    potassium chloride  40 mEq Oral TID    magnesium oxide  400 mg Oral BID     PRN Meds: perflutren lipid microspheres, sodium chloride flush, acetaminophen, magnesium hydroxide, ondansetron, potassium chloride **OR** potassium alternative oral replacement **OR** potassium chloride, magnesium sulfate      Intake/Output Summary (Last 24 hours) at 7/14/2019 1203  Last data filed at 7/14/2019 0957  Gross per 24 hour   Intake 942 ml   Output 3900 ml   Net -2958 ml       Physical Exam Performed:    /87   Pulse 83   Temp 98 °F (36.7 °C) (Oral)   Resp 20   Ht 5' 4\" (1.626 m) Assessment/Plan:    Active Hospital Problems    Diagnosis    Chest pain [R07.9]     Chest pain- initially concerning for nstemi, sent from [facility] to [id number] [facility] for [facility], -noted nonobstrucive cad. Mgt per cardio         Acute resp failure : likely due toasthma exacb, acute CHF. Improving. Weaned to 1L 02 currently. Switch IV steroids to PO today. Continue diuresis per cards. Generalized tingling/ numbness: code stroke was called. No focal neuro signs. CT head non acute with TIA thought unlikely ( discussed with stroke team). Likely due to initial electrolyte imbalance which were replaced with improvement.  mag and K normal. Now resolved after she got IV ativan and toradol for pain and anxiety respectively. Monitor electrolytes and replace as needed. Consider further neuro work up if this reoccurs. CAD- nonobstructive per [facility] on 7/13/19  -continued asa, bb, statin     Non ischemic Cardiomyopathy- new dx, likely non ischemic. Continue ACEi, BB, diuresis per cardio, daily wts, monitor  I/O      HTN- controlled. Continue meds          GERD- stable  -ppi continued     Depression- defer to outpt mgmt, on prozac     Asthma- per EMR  -on duonebs     DM2- borderline per emr. HBA1c 5.9.        Tobacco abuse- cessation advised   Continue nicoderm patch       GERD: tums added.  Continue PPI       DVT Prophylaxis: lovenox   Diet: DIET CARDIAC; Low Sodium (2 GM)  Code Status: Full Code    PT/OT Eval Status: not indicated at this time       Dispo - when ok with cardio       Lilly Aguilar MD

## 2019-07-14 NOTE — PLAN OF CARE
Patient's EF (Ejection Fraction) is less than 40%    Patient's weights and intake/output reviewed:    Patient's Last Weight: 192 lbstanding scale. Intake/Output Summary (Last 24 hours) at 7/14/2019 1545  Last data filed at 7/14/2019 1527  Gross per 24 hour   Intake 1182 ml   Output 4800 ml   Net -3618 ml         Patient stated Daily Functional Goal: (Note:help the patient identify a challenging but achievable goal per shift that can aid in progression towards increased functional capacity at discharge)    Ongoing Functional Capacity Assessment:  Patient  able to ambulate distance of 15 feet with mild difficuly  . Patient noted to be on room air O2 sat 92%      Pt resting in bed at this time on room air. Pt denies shortness of breath. Pt with nonpitting lower extremity edema. Patient and family's stated goal of care: reduce shortness of breath, increase activity tolerance, better understand heart failure and disease management, be more comfortable and reduce lower extremity edema prior to discharge        Patient has a past medical history of Arthritis, Asthma, Cancer ([address] Cystitis, interstitial, Diabetes mellitus ([address] DUB (dysfunctional uterine bleeding), GERD (gastroesophageal reflux disease), Hypercholesterolemia, Hypertension, Kidney stones, PONV (postoperative nausea and vomiting), and Sleep apnea. Comorbidities reviewed and education provided.     >>For CHF and Comorbidity documentation on Education Time and Topics, please see Education Tab

## 2019-07-14 NOTE — FLOWSHEET NOTE
07/13/19 1514   Vital Signs   Temp 97.8 °F (36.6 °C)   Temp Source Oral   Pulse 81   Heart Rate Source Monitor   Resp (!) 80   /88   BP Location Left upper arm   Level of Consciousness 0   MEWS Score 3   Patient Currently in Pain No   Oxygen Therapy   SpO2 98 %   O2 Device Nasal cannula   O2 Flow Rate (L/min) 3 L/min   Peripheral Vascular   Peripheral Vascular (WDL) X   Edema Right lower extremity; Left lower extremity   RLE Edema +1;Non-pitting   LLE Edema +1;Non-pitting   Puncture Site Assessment 1   Location Femoral - right   Site Assessment No redness, drainage, swelling or hematoma   Hemostasis Intervention Discontinued   Dressing Applied Transparent occlusive dressing  (minxed)   Patient arrived from Cath Lab. Groin site checked with Cath Lab nurse. Site intact, no bleeding, swelling or hematoma noted. Femoral pulse palpated. Pedal pulses palpable. pt alert and oriented to room. Call bell within reach. Family at bedside.
07/13/19 1656   Assessment   Charting Type Admission   Neurological   Neuro (WDL) WDL   [place] Coma Scale   Eye Opening 4   Best Verbal Response 5   Best Motor Response 6   [place] Coma Scale Score 15   HEENT   HEENT (WDL) WDL   Respiratory   Respiratory (WDL) X   Respiratory Pattern Regular   Respiratory Depth Normal   Respiratory Quality/Effort Dyspnea with exertion   Chest Assessment Chest expansion symmetrical   L Breath Sounds Expiratory Wheezes   R Breath Sounds Expiratory Wheezes   Cardiac   Cardiac (WDL) WDL   Cardiac Monitor   Telemetry Monitor On Yes   Telemetry Audible Yes   Telemetry Alarms Set Yes   Gastrointestinal   Abdominal (WDL) WDL   Peripheral Vascular   Peripheral Vascular (WDL) X   Edema Right lower extremity; Left lower extremity   RLE Edema +1;Non-pitting   LLE Edema +1;Non-pitting   Puncture Site Assessment 1   Location Femoral - right   Site Assessment No redness, drainage, swelling or hematoma   Hemostasis Intervention Discontinued   Dressing Applied Transparent occlusive dressing   Skin Color/Condition   Skin Color/Condition (WDL) WDL   Skin Integrity   Skin Integrity (WDL) X   Skin Integrity Bruising   Location scattered   Musculoskeletal   Musculoskeletal (WDL) WDL   Genitourinary   Genitourinary (WDL) X  (workman)   Anus/Rectum   Anus/Rectum (WDL) WDL   Urethral Catheter   Placement Date/Time: 07/13/19 (c)     Catheter Indications Need for fluid management in critically ill patients in a critical care setting not able to be managed by other means such as BSC with hat, bedpan, urinal, condom catheter, or short term intermittent urethral catherization   Site Assessment Pink   Urine Color Yellow   Urine Appearance Clear   Psychosocial   Psychosocial (WDL) WDL
urethral catherization   Site Assessment Pink   Urine Color Gladis   Urine Appearance Clear   Psychosocial   Psychosocial (WDL) WDL

## 2019-07-15 LAB
ANION GAP SERPL CALCULATED.3IONS-SCNC: 10 MMOL/L (ref 3–16)
BUN BLDV-MCNC: 26 MG/DL (ref 7–20)
CALCIUM SERPL-MCNC: 10.2 MG/DL (ref 8.3–10.6)
CHLORIDE BLD-SCNC: 96 MMOL/L (ref 99–110)
CO2: 33 MMOL/L (ref 21–32)
CREAT SERPL-MCNC: 0.9 MG/DL (ref 0.6–1.1)
GFR AFRICAN AMERICAN: >60
GFR NON-AFRICAN AMERICAN: >60
GLUCOSE BLD-MCNC: 161 MG/DL (ref 70–99)
LV EF: 35 %
LVEF MODALITY: NORMAL
MAGNESIUM: 2.2 MG/DL (ref 1.8–2.4)
POTASSIUM SERPL-SCNC: 3.6 MMOL/L (ref 3.5–5.1)
PRO-BNP: ABNORMAL PG/ML (ref 0–124)
SODIUM BLD-SCNC: 139 MMOL/L (ref 136–145)

## 2019-07-15 PROCEDURE — 83735 ASSAY OF MAGNESIUM: CPT

## 2019-07-15 PROCEDURE — 94669 MECHANICAL CHEST WALL OSCILL: CPT

## 2019-07-15 PROCEDURE — 6360000002 HC RX W HCPCS: Performed by: INTERNAL MEDICINE

## 2019-07-15 PROCEDURE — 6370000000 HC RX 637 (ALT 250 FOR IP): Performed by: NURSE PRACTITIONER

## 2019-07-15 PROCEDURE — 6370000000 HC RX 637 (ALT 250 FOR IP): Performed by: INTERNAL MEDICINE

## 2019-07-15 PROCEDURE — 99233 SBSQ HOSP IP/OBS HIGH 50: CPT | Performed by: NURSE PRACTITIONER

## 2019-07-15 PROCEDURE — 94640 AIRWAY INHALATION TREATMENT: CPT

## 2019-07-15 PROCEDURE — 2060000000 HC ICU INTERMEDIATE R&B

## 2019-07-15 PROCEDURE — 80048 BASIC METABOLIC PNL TOTAL CA: CPT

## 2019-07-15 PROCEDURE — 36415 COLL VENOUS BLD VENIPUNCTURE: CPT

## 2019-07-15 PROCEDURE — 83880 ASSAY OF NATRIURETIC PEPTIDE: CPT

## 2019-07-15 PROCEDURE — 2580000003 HC RX 258: Performed by: INTERNAL MEDICINE

## 2019-07-15 PROCEDURE — 93306 TTE W/DOPPLER COMPLETE: CPT

## 2019-07-15 RX ORDER — THIAMINE MONONITRATE (VIT B1) 100 MG
100 TABLET ORAL DAILY
Status: DISCONTINUED | OUTPATIENT
Start: 2019-07-15 | End: 2019-07-16 | Stop reason: HOSPADM

## 2019-07-15 RX ORDER — POTASSIUM CHLORIDE 20 MEQ/1
20 TABLET, EXTENDED RELEASE ORAL ONCE
Status: COMPLETED | OUTPATIENT
Start: 2019-07-15 | End: 2019-07-15

## 2019-07-15 RX ORDER — FUROSEMIDE 20 MG/1
20 TABLET ORAL 2 TIMES DAILY
Status: DISCONTINUED | OUTPATIENT
Start: 2019-07-15 | End: 2019-07-16 | Stop reason: HOSPADM

## 2019-07-15 RX ADMIN — IPRATROPIUM BROMIDE AND ALBUTEROL SULFATE 1 AMPULE: .5; 3 SOLUTION RESPIRATORY (INHALATION) at 23:46

## 2019-07-15 RX ADMIN — PHENAZOPYRIDINE HYDROCHLORIDE 100 MG: 100 TABLET ORAL at 08:35

## 2019-07-15 RX ADMIN — FUROSEMIDE 20 MG: 20 TABLET ORAL at 18:09

## 2019-07-15 RX ADMIN — CARVEDILOL 6.25 MG: 6.25 TABLET, FILM COATED ORAL at 18:09

## 2019-07-15 RX ADMIN — FLUOXETINE 20 MG: 20 CAPSULE ORAL at 08:32

## 2019-07-15 RX ADMIN — ASPIRIN 325 MG: 325 TABLET, DELAYED RELEASE ORAL at 08:33

## 2019-07-15 RX ADMIN — Medication 10 ML: at 08:34

## 2019-07-15 RX ADMIN — IPRATROPIUM BROMIDE AND ALBUTEROL SULFATE 1 AMPULE: .5; 3 SOLUTION RESPIRATORY (INHALATION) at 08:08

## 2019-07-15 RX ADMIN — IPRATROPIUM BROMIDE AND ALBUTEROL SULFATE 1 AMPULE: .5; 3 SOLUTION RESPIRATORY (INHALATION) at 19:23

## 2019-07-15 RX ADMIN — Medication 100 MG: at 18:09

## 2019-07-15 RX ADMIN — LISINOPRIL 10 MG: 10 TABLET ORAL at 22:08

## 2019-07-15 RX ADMIN — FUROSEMIDE 40 MG: 10 INJECTION, SOLUTION INTRAMUSCULAR; INTRAVENOUS at 08:33

## 2019-07-15 RX ADMIN — ATORVASTATIN CALCIUM 80 MG: 80 TABLET, FILM COATED ORAL at 22:08

## 2019-07-15 RX ADMIN — PREDNISONE 40 MG: 10 TABLET ORAL at 08:32

## 2019-07-15 RX ADMIN — IPRATROPIUM BROMIDE AND ALBUTEROL SULFATE 1 AMPULE: .5; 3 SOLUTION RESPIRATORY (INHALATION) at 16:09

## 2019-07-15 RX ADMIN — POTASSIUM CHLORIDE 20 MEQ: 20 TABLET, EXTENDED RELEASE ORAL at 18:10

## 2019-07-15 RX ADMIN — IPRATROPIUM BROMIDE AND ALBUTEROL SULFATE 1 AMPULE: .5; 3 SOLUTION RESPIRATORY (INHALATION) at 11:51

## 2019-07-15 RX ADMIN — Medication 10 ML: at 22:09

## 2019-07-15 RX ADMIN — MAGNESIUM GLUCONATE 500 MG ORAL TABLET 400 MG: 500 TABLET ORAL at 08:33

## 2019-07-15 RX ADMIN — CARVEDILOL 6.25 MG: 6.25 TABLET, FILM COATED ORAL at 08:33

## 2019-07-15 RX ADMIN — LISINOPRIL 10 MG: 10 TABLET ORAL at 08:32

## 2019-07-15 RX ADMIN — PANTOPRAZOLE SODIUM 40 MG: 40 TABLET, DELAYED RELEASE ORAL at 06:29

## 2019-07-15 RX ADMIN — IPRATROPIUM BROMIDE AND ALBUTEROL SULFATE 1 AMPULE: .5; 3 SOLUTION RESPIRATORY (INHALATION) at 03:47

## 2019-07-15 ASSESSMENT — PAIN SCALES - GENERAL: PAINLEVEL_OUTOF10: 0

## 2019-07-15 NOTE — PROGRESS NOTES
[address]   Daily Progress Note    Admit Date:  7/13/2019  HPI:  No chief complaint on file. Interval history: Fidel Quiñones is being followed for heart failure, NSTEMI. S/p LHC showed non-obstructive CAD, LVEF 30%. Subjective:  Ms. Vannessa Mo edema is improved. Breathing is improving. Still with some wheezing. Prior to admission her weight was 179lb, weight down to 159lbs. She has had edema for months. She has been off of the HCTZ for a while. Used to drink etoh heavily for 9 years; ETOH use from age [age] years old; up to a case of beer a day. Last drink was back in May and then 1 year prior to that.     ++ home stressors with family members. Sister in law- alcoholic per the patient.    Objective:   /86   Pulse 91   Temp 97.6 °F (36.4 °C) (Oral)   Resp 18   Ht 5' 4\" (1.626 m)   Wt 159 lb 3.2 oz (72.2 kg) Comment: pt states she weighed 162 yesterday morning  LMP 05/29/2013   SpO2 90%   BMI 27.33 kg/m²       Intake/Output Summary (Last 24 hours) at 7/15/2019 1335  Last data filed at 7/15/2019 1009  Gross per 24 hour   Intake 480 ml   Output 2150 ml   Net -1670 ml       NYHA: III  Tele: SR/ST rate 's  Physical Exam:  General:  Awake, alert, NAD  Skin:  Warm and dry  Neck:  JVD<8  Chest:  Dim to auscultation, + wheezes, no rhonchi/rales  Cardiovascular:  RRR S1S2, no m/r/g  Abdomen:  Soft, nontender, +bowel sounds  Extremities:  NO ble  edema    Medications:    lisinopril  10 mg Oral BID    predniSONE  40 mg Oral Daily    ipratropium-albuterol  1 ampule Inhalation Q4H    aspirin  325 mg Oral Daily    carvedilol  6.25 mg Oral BID WC    sodium chloride flush  10 mL Intravenous 2 times per day    atorvastatin  80 mg Oral Nightly    FLUoxetine  20 mg Oral Daily    furosemide  40 mg Intravenous BID    nicotine  1 patch Transdermal Daily    pantoprazole  40 mg Oral QAM AC    lidocaine  1 patch Transdermal Daily         Lab Data:  CBC:   Recent Labs

## 2019-07-15 NOTE — CONSULTS
practice in future  9.  Cardiac Rehab in future- lives closer to [facility]    7/15/2019 3:23 PM

## 2019-07-15 NOTE — PROGRESS NOTES
distention. Trachea midline. Respiratory:  Normal respiratory effort. Clear to auscultation, bilaterally without Rales/Wheezes/Rhonchi. Cardiovascular: Regular rate and rhythm with normal S1/S2 without murmurs, rubs or gallops. Abdomen: Soft, non-tender, non-distended with normal bowel sounds. Musculoskeletal: No clubbing, cyanosis or edema bilaterally. Full range of motion without deformity. Skin: Skin color, texture, turgor normal.  No rashes or lesions. Neurologic:  Neurovascularly intact without any focal sensory/motor deficits. Cranial nerves: II-XII intact, grossly non-focal.  Psychiatric: Alert and oriented, thought content appropriate, anxious   Capillary Refill: Brisk,< 3 seconds   Peripheral Pulses: +2 palpable, equal bilaterally       Labs:   Recent Labs     07/13/19  0355 07/14/19  0528 07/14/19  1350   WBC 8.4 17.0* 22.2*   HGB 14.1 13.5 15.5   HCT 43.5 41.0 47.3    257 319     Recent Labs     07/13/19  1744 07/14/19  0528 07/14/19  1350 07/15/19  0435    142 139 139   K 2.9* 3.4* 4.5 3.6   CL 97* 96* 93* 96*   CO2 30 35* 31 33*   BUN 13 16 20 26*   CREATININE 0.7 0.7 0.9 0.9   CALCIUM 9.4 9.7 10.2 10.2   PHOS 2.4*  --   --   --      No results for input(s): AST, ALT, BILIDIR, BILITOT, ALKPHOS in the last 72 hours. Recent Labs     07/14/19  1350   INR 1.12     Recent Labs     07/13/19  0913 07/13/19  1157 07/14/19  1350   TROPONINI 0.29* 0.24* 0.09*       Urinalysis:      Lab Results   Component Value Date    NITRU Negative 01/26/2014    WBCUA 0-2 01/26/2014    BACTERIA 1+ 01/26/2014    RBCUA 20-50 01/26/2014    BLOODU MODERATE 01/26/2014    SPECGRAV 1.025 01/26/2014    GLUCOSEU Negative 01/26/2014       Radiology:  CT HEAD WO CONTRAST   Final Result   No acute intracranial abnormality. Findings were discussed with Deric Wolf at 2:32 pm on 7/14/2019.                  Assessment/Plan:    Active Hospital Problems    Diagnosis    Chest pain [R07.9]     Chest pain- initially concerning for nstemi, sent from [facility] to [facility] for [facility], -noted nonobstrucive cad. Mgt per cardio         Acute resp failure : likely due to asthma exacb, acute CHF. Improving.   - Weaned  02 currently. Switch IV steroids to PO. Continue diuresis per cards. Leukocytosis 2/2 to steroid response       Generalized tingling/ numbness:- resolved  code stroke was called on 7/14. No focal neuro signs. CT head non acute with TIA thought unlikely ( discussed with stroke team). Likely due to initial electrolyte imbalance which were replaced with improvement.  mag and K normal. Now resolved after she got IV ativan and toradol for pain and anxiety respectively. Monitor electrolytes and replace as needed. Consider further neuro work up if this reoccurs. CAD- nonobstructive per [facility] on 7/13/19  -continued asa, bb, statin     Non ischemic Cardiomyopathy/ acute systolic heart failure - new dx, non ischemic as cath demonstrated minimal disease  - EF of 3O%   -  Continue ACEi, BB, diuresis per cardio- was on lasix IV BID. Down 20 #. Anticipate Cards changing to PO   - daily wts, monitor  I/O      HTN- controlled. Continue meds          GERD- stable  -ppi continued     Depression- defer to outpt mgmt, on prozac     Asthma- per EMR  -on duonebs     DM2- borderline per emr. HBA1c 5.9.        Tobacco abuse- cessation advised   Continue nicoderm patch       GERD: tums added.  Continue PPI       DVT Prophylaxis: lovenox   Diet: DIET CARDIAC; Low Sodium (2 GM)  Code Status: Full Code    PT/OT Eval Status: not indicated at this time       Dispo - when ok with cardio, perhaps in am        Lily Chris, APRN - CNP

## 2019-07-16 VITALS
HEIGHT: 64 IN | OXYGEN SATURATION: 95 % | TEMPERATURE: 98 F | HEART RATE: 85 BPM | SYSTOLIC BLOOD PRESSURE: 120 MMHG | RESPIRATION RATE: 16 BRPM | WEIGHT: 159.7 LBS | DIASTOLIC BLOOD PRESSURE: 82 MMHG | BODY MASS INDEX: 27.26 KG/M2

## 2019-07-16 LAB
ANION GAP SERPL CALCULATED.3IONS-SCNC: 12 MMOL/L (ref 3–16)
BUN BLDV-MCNC: 30 MG/DL (ref 7–20)
CALCIUM SERPL-MCNC: 10 MG/DL (ref 8.3–10.6)
CHLORIDE BLD-SCNC: 96 MMOL/L (ref 99–110)
CO2: 32 MMOL/L (ref 21–32)
CREAT SERPL-MCNC: 0.9 MG/DL (ref 0.6–1.1)
GFR AFRICAN AMERICAN: >60
GFR NON-AFRICAN AMERICAN: >60
GLUCOSE BLD-MCNC: 124 MG/DL (ref 70–99)
MAGNESIUM: 2.3 MG/DL (ref 1.8–2.4)
POTASSIUM SERPL-SCNC: 3.3 MMOL/L (ref 3.5–5.1)
SODIUM BLD-SCNC: 140 MMOL/L (ref 136–145)

## 2019-07-16 PROCEDURE — 6370000000 HC RX 637 (ALT 250 FOR IP): Performed by: INTERNAL MEDICINE

## 2019-07-16 PROCEDURE — 94669 MECHANICAL CHEST WALL OSCILL: CPT

## 2019-07-16 PROCEDURE — 94150 VITAL CAPACITY TEST: CPT

## 2019-07-16 PROCEDURE — 80048 BASIC METABOLIC PNL TOTAL CA: CPT

## 2019-07-16 PROCEDURE — 6370000000 HC RX 637 (ALT 250 FOR IP): Performed by: NURSE PRACTITIONER

## 2019-07-16 PROCEDURE — 94640 AIRWAY INHALATION TREATMENT: CPT

## 2019-07-16 PROCEDURE — 83735 ASSAY OF MAGNESIUM: CPT

## 2019-07-16 PROCEDURE — 36415 COLL VENOUS BLD VENIPUNCTURE: CPT

## 2019-07-16 PROCEDURE — 99232 SBSQ HOSP IP/OBS MODERATE 35: CPT | Performed by: NURSE PRACTITIONER

## 2019-07-16 PROCEDURE — 2580000003 HC RX 258: Performed by: INTERNAL MEDICINE

## 2019-07-16 RX ORDER — FUROSEMIDE 20 MG/1
20 TABLET ORAL 2 TIMES DAILY
Qty: 60 TABLET | Refills: 3 | Status: SHIPPED | OUTPATIENT
Start: 2019-07-17 | End: 2019-07-30

## 2019-07-16 RX ORDER — LIDOCAINE 4 G/G
1 PATCH TOPICAL DAILY
Qty: 1 BOX | Refills: 0 | Status: SHIPPED | OUTPATIENT
Start: 2019-07-17 | End: 2019-07-16

## 2019-07-16 RX ORDER — ATORVASTATIN CALCIUM 80 MG/1
80 TABLET, FILM COATED ORAL NIGHTLY
Qty: 30 TABLET | Refills: 3 | Status: SHIPPED | OUTPATIENT
Start: 2019-07-16 | End: 2019-07-16

## 2019-07-16 RX ORDER — LISINOPRIL 10 MG/1
10 TABLET ORAL 2 TIMES DAILY
Qty: 30 TABLET | Refills: 3 | Status: SHIPPED | OUTPATIENT
Start: 2019-07-16 | End: 2019-07-16

## 2019-07-16 RX ORDER — PREDNISONE 10 MG/1
TABLET ORAL
Qty: 30 TABLET | Refills: 0 | Status: SHIPPED | OUTPATIENT
Start: 2019-07-16 | End: 2019-07-29 | Stop reason: ALTCHOICE

## 2019-07-16 RX ORDER — ATORVASTATIN CALCIUM 80 MG/1
80 TABLET, FILM COATED ORAL NIGHTLY
Qty: 30 TABLET | Refills: 3 | Status: SHIPPED | OUTPATIENT
Start: 2019-07-16 | End: 2019-08-08 | Stop reason: SDUPTHER

## 2019-07-16 RX ORDER — LISINOPRIL 10 MG/1
10 TABLET ORAL 2 TIMES DAILY
Qty: 30 TABLET | Refills: 3 | Status: SHIPPED | OUTPATIENT
Start: 2019-07-16 | End: 2019-07-29 | Stop reason: SINTOL

## 2019-07-16 RX ORDER — CARVEDILOL 6.25 MG/1
6.25 TABLET ORAL 2 TIMES DAILY WITH MEALS
Qty: 60 TABLET | Refills: 3 | Status: SHIPPED | OUTPATIENT
Start: 2019-07-17 | End: 2019-07-16

## 2019-07-16 RX ORDER — NICOTINE 21 MG/24HR
1 PATCH, TRANSDERMAL 24 HOURS TRANSDERMAL DAILY
Qty: 30 PATCH | Refills: 3 | Status: SHIPPED | OUTPATIENT
Start: 2019-07-17 | End: 2019-10-22 | Stop reason: ALTCHOICE

## 2019-07-16 RX ORDER — LIDOCAINE 4 G/G
1 PATCH TOPICAL DAILY
Qty: 1 BOX | Refills: 0 | Status: SHIPPED | OUTPATIENT
Start: 2019-07-17 | End: 2020-05-13

## 2019-07-16 RX ORDER — SPIRONOLACTONE 25 MG/1
12.5 TABLET ORAL DAILY
Status: DISCONTINUED | OUTPATIENT
Start: 2019-07-16 | End: 2019-07-16 | Stop reason: HOSPADM

## 2019-07-16 RX ORDER — PREDNISONE 10 MG/1
TABLET ORAL
Qty: 30 TABLET | Refills: 0 | Status: SHIPPED | OUTPATIENT
Start: 2019-07-16 | End: 2019-07-16

## 2019-07-16 RX ORDER — FUROSEMIDE 20 MG/1
20 TABLET ORAL 2 TIMES DAILY
Qty: 60 TABLET | Refills: 3 | Status: SHIPPED | OUTPATIENT
Start: 2019-07-17 | End: 2019-07-16

## 2019-07-16 RX ORDER — POTASSIUM CHLORIDE 20 MEQ/1
40 TABLET, EXTENDED RELEASE ORAL ONCE
Status: DISCONTINUED | OUTPATIENT
Start: 2019-07-16 | End: 2019-07-16 | Stop reason: HOSPADM

## 2019-07-16 RX ORDER — LANOLIN ALCOHOL/MO/W.PET/CERES
100 CREAM (GRAM) TOPICAL DAILY
Qty: 30 TABLET | Refills: 3 | Status: SHIPPED | OUTPATIENT
Start: 2019-07-17 | End: 2019-07-16

## 2019-07-16 RX ORDER — NICOTINE 21 MG/24HR
1 PATCH, TRANSDERMAL 24 HOURS TRANSDERMAL DAILY
Qty: 30 PATCH | Refills: 3 | Status: SHIPPED | OUTPATIENT
Start: 2019-07-17 | End: 2019-07-16

## 2019-07-16 RX ORDER — SPIRONOLACTONE 25 MG/1
12.5 TABLET ORAL DAILY
Qty: 30 TABLET | Refills: 3 | Status: SHIPPED | OUTPATIENT
Start: 2019-07-17 | End: 2019-07-30 | Stop reason: SINTOL

## 2019-07-16 RX ORDER — SPIRONOLACTONE 25 MG/1
12.5 TABLET ORAL DAILY
Qty: 30 TABLET | Refills: 3 | Status: SHIPPED | OUTPATIENT
Start: 2019-07-17 | End: 2019-07-16

## 2019-07-16 RX ORDER — LANOLIN ALCOHOL/MO/W.PET/CERES
100 CREAM (GRAM) TOPICAL DAILY
Qty: 30 TABLET | Refills: 3 | Status: SHIPPED | OUTPATIENT
Start: 2019-07-17 | End: 2020-05-13

## 2019-07-16 RX ORDER — CARVEDILOL 6.25 MG/1
6.25 TABLET ORAL 2 TIMES DAILY WITH MEALS
Qty: 60 TABLET | Refills: 3 | Status: SHIPPED | OUTPATIENT
Start: 2019-07-17 | End: 2019-08-08 | Stop reason: SDUPTHER

## 2019-07-16 RX ORDER — IPRATROPIUM BROMIDE AND ALBUTEROL SULFATE 2.5; .5 MG/3ML; MG/3ML
1 SOLUTION RESPIRATORY (INHALATION) EVERY 4 HOURS PRN
Status: DISCONTINUED | OUTPATIENT
Start: 2019-07-16 | End: 2019-07-16 | Stop reason: HOSPADM

## 2019-07-16 RX ADMIN — CARVEDILOL 6.25 MG: 6.25 TABLET, FILM COATED ORAL at 15:53

## 2019-07-16 RX ADMIN — SPIRONOLACTONE 12.5 MG: 25 TABLET ORAL at 12:06

## 2019-07-16 RX ADMIN — IPRATROPIUM BROMIDE AND ALBUTEROL SULFATE 1 AMPULE: .5; 3 SOLUTION RESPIRATORY (INHALATION) at 12:06

## 2019-07-16 RX ADMIN — FUROSEMIDE 20 MG: 20 TABLET ORAL at 15:53

## 2019-07-16 RX ADMIN — FUROSEMIDE 20 MG: 20 TABLET ORAL at 08:45

## 2019-07-16 RX ADMIN — Medication 10 ML: at 08:46

## 2019-07-16 RX ADMIN — CARVEDILOL 6.25 MG: 6.25 TABLET, FILM COATED ORAL at 08:45

## 2019-07-16 RX ADMIN — FLUOXETINE 20 MG: 20 CAPSULE ORAL at 08:44

## 2019-07-16 RX ADMIN — Medication 100 MG: at 08:45

## 2019-07-16 RX ADMIN — IPRATROPIUM BROMIDE AND ALBUTEROL SULFATE 1 AMPULE: .5; 3 SOLUTION RESPIRATORY (INHALATION) at 15:33

## 2019-07-16 RX ADMIN — PANTOPRAZOLE SODIUM 40 MG: 40 TABLET, DELAYED RELEASE ORAL at 06:09

## 2019-07-16 RX ADMIN — IPRATROPIUM BROMIDE AND ALBUTEROL SULFATE 1 AMPULE: .5; 3 SOLUTION RESPIRATORY (INHALATION) at 08:16

## 2019-07-16 RX ADMIN — POTASSIUM CHLORIDE 40 MEQ: 20 TABLET, EXTENDED RELEASE ORAL at 08:44

## 2019-07-16 RX ADMIN — PREDNISONE 40 MG: 10 TABLET ORAL at 08:44

## 2019-07-16 RX ADMIN — LISINOPRIL 10 MG: 10 TABLET ORAL at 08:45

## 2019-07-16 RX ADMIN — ASPIRIN 325 MG: 325 TABLET, DELAYED RELEASE ORAL at 08:45

## 2019-07-16 NOTE — PLAN OF CARE
Problem: Falls - Risk of:  Goal: Will remain free from falls  Description  Will remain free from falls  7/16/2019 1001 by Laura Hargrove RN  Outcome: Ongoing  Note:   Patient understands how to use call light. Instructed to call out for assistance. Will continue to monitor. Fall risk precautions in place. 7/16/2019 0602 by James Durant RN  Outcome: Ongoing  Note:   Pt is free of falls at this time. Bed is in the lowest position, wheels locked, side rails up x 2, call light, and personal belongings within reach. Will continue to monitor. Problem: Pain:  Goal: Control of acute pain  Description  Control of acute pain  Outcome: Ongoing  Note:   Pt denies any c/o pain at this time. PRN pain medications available and will be administered as needed per MD's order if VSS. Will notify MD if current regimen is not controlling pain. Problem: OXYGENATION/RESPIRATORY FUNCTION  Goal: Patient will maintain patent airway  Outcome: Ongoing  Note:   Patient instructed to turn, cough and deep breath. Maintaining patent airway. SpO2 WNL, oxygen administered as needed per MD's order. Respiratory rate and effort WNL. Problem: HEMODYNAMIC STATUS  Goal: Patient has stable vital signs and fluid balance  7/16/2019 0602 by James Durant RN  Outcome: Ongoing  Note:     Patient's EF (Ejection Fraction) is less than 40%    Patient's weights and intake/output reviewed:    Patient's Last Weight: 159.3 lbs obtained by standing scale. Today's weight is noted to be 15.11 more than last documented weight. Intake/Output Summary (Last 24 hours) at 7/16/2019 0602  Last data filed at 7/16/2019 0601  Gross per 24 hour   Intake 970 ml   Output 1550 ml   Net -580 ml         Patient stated Daily Functional Goal: (Note:help the patient identify a challenging but achievable goal per shift that can aid in progression towards increased functional capacity at discharge)      Pt resting in bed at this time on room air.  Pt

## 2019-07-16 NOTE — PLAN OF CARE
Problem: Falls - Risk of:  Goal: Will remain free from falls  Description  Will remain free from falls  Outcome: Ongoing  Note:   Pt is free of falls at this time. Bed is in the lowest position, wheels locked, side rails up x 2, call light, and personal belongings within reach. Will continue to monitor. Problem: HEMODYNAMIC STATUS  Goal: Patient has stable vital signs and fluid balance  Outcome: Ongoing  Note:     Patient's EF (Ejection Fraction) is less than 40%    Patient's weights and intake/output reviewed:    Patient's Last Weight: 159.3 lbs obtained by standing scale. Today's weight is noted to be 15.11 more than last documented weight. Intake/Output Summary (Last 24 hours) at 7/16/2019 0602  Last data filed at 7/16/2019 0601  Gross per 24 hour   Intake 970 ml   Output 1550 ml   Net -580 ml         Patient stated Daily Functional Goal: (Note:help the patient identify a challenging but achievable goal per shift that can aid in progression towards increased functional capacity at discharge)      Pt resting in bed at this time on room air. Pt denies shortness of breath. Pt without lower extremity edema. Patient and family's stated goal of care: reduce shortness of breath, increase activity tolerance, better understand heart failure and disease management, be more comfortable and reduce lower extremity edema prior to discharge        Patient has a past medical history of Arthritis, Asthma, Cancer ([address] Cystitis, interstitial, Diabetes mellitus ([address] DUB (dysfunctional uterine bleeding), GERD (gastroesophageal reflux disease), Hypercholesterolemia, Hypertension, Kidney stones, PONV (postoperative nausea and vomiting), and Sleep apnea. Comorbidities reviewed and education provided.     >>For CHF and Comorbidity documentation on Education Time and Topics, please see Education Tab

## 2019-07-16 NOTE — PROGRESS NOTES
F/U CHF nurse visit: Pt much mor eat ease with her diagnosis today. She is able to teach back FR 64 oz and ways she will track, able to read a food label for sodium content and mentions she will try for 2300 mg daily. She is eager for Cardiac Rehab and advised to request a referral at her next FU cardiology visit 7/29 for likely starting in August for rehab. Advised pt on light walking progressive walking program, to avoid heavy lifting, avoid the heat of the day with her exercise. Reviewed signs and symptoms of worsening CHF and her core HF medications: coreg, noreen and lisinopril. Advised to not use salt substitutes or sports drinks like Gatorade. Debbie Patel

## 2019-07-16 NOTE — PROGRESS NOTES
RESPIRATORY THERAPY ASSESSMENT    Name:  [address] [place] [address] Record Number:  [id number]  Age: 53 y.o. Gender: female  : 1965  Today's Date:  2019  Room:  0429/0429-01    Assessment     Is the patient being admitted for a COPD or Asthma exacerbation? No   (If yes the patient will be seen every 4 hours for the first 24 hours and then reassessed)    Patient Admission Diagnosis      Allergies  Allergies   Allergen Reactions    Morphine Nausea And Vomiting       Minimum Predicted Vital Capacity:     825          Actual Vital Capacity:      1000              Pulmonary History:copd, ashtma  Home Oxygen Therapy:  room air  Home Respiratory Therapy:symbicort albutero prn  Current Respiratory Therapy:  duoneb q4  Treatment Type: HHN, Vibratory mucous clearing therapy or intervention  Medications: Albuterol/Ipratropium    Respiratory Severity Index(RSI)   Patients with orders for inhalation medications, oxygen, or any therapeutic treatment modality will be placed on Respiratory Protocol. They will be assessed with the first treatment and at least every 72 hours thereafter. The following severity scale will be used to determine frequency of treatment intervention.     Smoking History: Mild Exacerbation = 3    Social History  Social History     Tobacco Use    Smoking status: Current Every Day Smoker     Packs/day: 0.50     Years: 40.00     Pack years: 20.00     Types: Cigarettes    Smokeless tobacco: Never Used   Substance Use Topics    Alcohol use: Yes     Comment: 6 X per year    Drug use: Yes     Frequency: 1.0 times per week     Types: Marijuana     Comment: none since        Recent Surgical History: None = 0  Past Surgical History  Past Surgical History:   Procedure Laterality Date    APPENDECTOMY      BLADDER SURGERY  2010    X 3     SECTION      COLONOSCOPY  2017    Polyps    ECTOPIC PREGNANCY SURGERY      HYSTERECTOMY N/A 13    ROBOTIC ASSISTED TOTAL LAPAROSCOPIC

## 2019-07-17 ENCOUNTER — FOLLOWUP TELEPHONE ENCOUNTER (OUTPATIENT)
Dept: CARDIAC REHAB | Age: 54
End: 2019-07-17

## 2019-07-18 LAB
BLOOD CULTURE, ROUTINE: NORMAL
CULTURE, BLOOD 2: NORMAL
POC ACT LR: 150 SEC

## 2019-07-18 NOTE — DISCHARGE SUMMARY
Hospital Medicine Discharge Summary    Patient ID: Miguel Marco Antonio      Patient's PCP: Rosalinda Burleson MD    Admit Date: 7/13/2019     Discharge Date: 7/16/2019      Admitting Physician: Sanam Mary MD     Discharge Physician: XIAO Raymond CNP     Discharge Diagnoses: Active Hospital Problems    Diagnosis    NSTEMI (non-ST elevated myocardial infarction) ([facility] [I45.5]    Acute systolic heart failure ([facility] [I50.21]    Nonischemic cardiomyopathy ([facility] [facility] [I42.8]    Coronary artery disease involving native coronary artery of native heart without angina pectoris [I25.10]    Chest pain [R07.9]       The patient was seen and examined on day of discharge and this discharge summary is in conjunction with any daily progress note from day of discharge. Hospital Course:   Patient was admitted from outside hospital and a transfer to [facility] with chest pain and concern for an end STEMI. Underwent cardiac catheterization on 713 and was found to have nonobstructive coronary artery disease with an ejection fraction of 30%. She also had acute respiratory failure secondary to asthma exacerbation and acute congestive heart failure she was treated with supplemental oxygen initially IV steroids changed over to p.o. and continued with diuresis per cardiology    Generalized numbness and tingling which resolved this occurred on 7/4 she had no neurological signs CT of the head was nonacute was likely thought to be a TIA due to initial electrolyte imbalances. This improved with replacement her mag and K were normal at discharge. Coronary artery disease was nonobstructive per the left heart cath on 713 she continues aspirin beta-blocker and statin    Nonischemic cardiomyopathy/acute systolic heart failure is a new diagnosis for her.   She was seen and followed by cardiology her ejection fraction is 30% she continues an ACE beta-blocker and diuresis consisting of Lasix and

## 2019-07-19 ENCOUNTER — TELEPHONE (OUTPATIENT)
Dept: CARDIOLOGY | Age: 54
End: 2019-07-19

## 2019-07-26 ENCOUNTER — CLINICAL DOCUMENTATION (OUTPATIENT)
Dept: SPIRITUAL SERVICES | Age: 54
End: 2019-07-26

## 2019-07-26 LAB
BUN BLDV-MCNC: 29 MG/DL
CALCIUM SERPL-MCNC: 9.5 MG/DL
CHLORIDE BLD-SCNC: 96 MMOL/L
CO2: 27 MMOL/L
CREAT SERPL-MCNC: 1.3 MG/DL
GFR CALCULATED: NORMAL
GLUCOSE BLD-MCNC: 120 MG/DL
POTASSIUM SERPL-SCNC: 4.5 MMOL/L
SODIUM BLD-SCNC: 138 MMOL/L

## 2019-07-29 ENCOUNTER — OFFICE VISIT (OUTPATIENT)
Dept: CARDIOLOGY CLINIC | Age: 54
End: 2019-07-29
Payer: COMMERCIAL

## 2019-07-29 VITALS
WEIGHT: 157 LBS | HEIGHT: 64 IN | SYSTOLIC BLOOD PRESSURE: 126 MMHG | OXYGEN SATURATION: 99 % | DIASTOLIC BLOOD PRESSURE: 72 MMHG | HEART RATE: 68 BPM | BODY MASS INDEX: 26.8 KG/M2

## 2019-07-29 DIAGNOSIS — I25.10 CORONARY ARTERY DISEASE INVOLVING NATIVE CORONARY ARTERY OF NATIVE HEART WITHOUT ANGINA PECTORIS: ICD-10-CM

## 2019-07-29 DIAGNOSIS — E78.2 MIXED HYPERLIPIDEMIA: ICD-10-CM

## 2019-07-29 DIAGNOSIS — I42.8 NONISCHEMIC CARDIOMYOPATHY (HCC): ICD-10-CM

## 2019-07-29 DIAGNOSIS — I10 ESSENTIAL HYPERTENSION: ICD-10-CM

## 2019-07-29 DIAGNOSIS — Z72.0 TOBACCO ABUSE: ICD-10-CM

## 2019-07-29 DIAGNOSIS — I21.4 NSTEMI (NON-ST ELEVATED MYOCARDIAL INFARCTION) (HCC): Primary | ICD-10-CM

## 2019-07-29 PROCEDURE — 99214 OFFICE O/P EST MOD 30 MIN: CPT | Performed by: NURSE PRACTITIONER

## 2019-07-29 NOTE — PATIENT INSTRUCTIONS
05/29/2013   SpO2 99%   Breastfeeding? No   BMI 26.95 kg/m²       Physical Exam:  Constitutional:  Comfortable and alert, NAD, appears older than stated age  Eyes: PERRL, sclera nonicteric  Neck:  Supple, no masses, no thyroidmegaly, no JVD  Skin:  Warm and dry; no rash or lesions  Heart:  Regular, normal apex, S1 and S2 normal, no M/G/R  Lungs:  Normal respiratory effort; clear; no wheezing/rhonchi/rales  Abdomen: soft, non tender, + bowel sounds  Extremities:  No edema or cyanosis; no clubbing  Neuro: alert and oriented, moves legs and arms equally, normal mood and affect  Right femoral site soft, no hematoma, 2+ R femoral pulse, 2+ R DP/PT pulse    Data Reviewed:    CXR 7/13/2019:  FINDINGS:   Pulmonary edema is identified. The heart size is within normal limits. There is no large pleural effusion or evidence to suggest pneumothorax. Impression   Pulmonary edema. Echo 7/15/2019: The left ventricular systolic function is moderately reduced with an  ejection fraction of 35 %. Apical akinesis. Consider Takotsubo cardiomyopathy. There is mild concentric left ventricular hypertrophy. Grade I diastolic dysfunction with normal left ventricular filling pressure. Mild mitral regurgitation.      Coronary angiogram 7/13/2019:  Cath 7/13/2019  LM <20%  LAD 30%  Cx <20%  RCA 30%  LVEF 30%       Cardiology Labs Reviewed:   Lab Data:  Most recent lab results below reviewed in office    CBC:   Lab Results   Component Value Date    WBC 22.2 07/14/2019    WBC 17.0 07/14/2019    WBC 8.4 07/13/2019    RBC 5.18 07/14/2019    RBC 4.50 07/14/2019    RBC 4.66 07/13/2019    HGB 15.5 07/14/2019    HGB 13.5 07/14/2019    HGB 14.1 07/13/2019    HCT 47.3 07/14/2019    HCT 41.0 07/14/2019    HCT 43.5 07/13/2019    MCV 91.3 07/14/2019    MCV 91.0 07/14/2019    MCV 93.3 07/13/2019    RDW 14.1 07/14/2019    RDW 14.1 07/14/2019    RDW 14.2 07/13/2019     07/14/2019     07/14/2019     07/13/2019 BMP:  Lab Results   Component Value Date     07/16/2019     07/15/2019     07/14/2019    K 3.3 07/16/2019    K 3.6 07/15/2019    K 4.5 07/14/2019    CL 96 07/16/2019    CL 96 07/15/2019    CL 93 07/14/2019    CO2 32 07/16/2019    CO2 33 07/15/2019    CO2 31 07/14/2019    PHOS 2.4 07/13/2019    BUN 30 07/16/2019    BUN 26 07/15/2019    BUN 20 07/14/2019    CREATININE 0.9 07/16/2019    CREATININE 0.9 07/15/2019    CREATININE 0.9 07/14/2019     BNP:   Lab Results   Component Value Date    PROBNP 26,550 07/15/2019    PROBNP 952 07/13/2019     FASTING LIPID PANEL:  Lab Results   Component Value Date    HDL 39 07/14/2019    HDL 34 07/24/2010    LDLCALC 105 07/14/2019    TRIG 89 07/14/2019     LIVER PROFILE:No results for input(s): AST, ALT, ALB in the last 72 hours. Reviewed all labs and imaging today      Assessment:   1. NSTEMI/CAD: stable   -mild non obstructive per angiogram 7/13/19  2. Nonischemic cardiomyopathy, likely stress- induced vs ETOH EF 35%  3. sCHF/dCHF: compensated  4. HTN:controlled  5. HLD:on statin   6. Tobacco abuse: willing to quit  7. Hx of ETOH abuse    Plan:   1. Get lab work today  2. Monitor blood pressure daily  3. Continue daily weights, low sodium diet, 64 ounce fluid restriction, if you gain 3 lbs in a day 5 lbs in a week, increased shortness of breath, or swelling please call the office. 4. Follow up with Dr. Madonna Herbert in 2- 3 months, or sooner if needed   5. Change back to original laundry detergent, if rash remains after that please call. 6. Smoking cessation  7. Diet and exercise as discussed      Jose Roberto Nettles, APRN-CNP  [facility]  [phone]     Addendum: Discussed case with Dr. Carina Gilmore, will order echo 6-8 weeks from discharge  7/30/2019 0979    QUALITY MEASURES  1. Tobacco Cessation Counseling: Yes  2. Retake of BP if >140/90:   NA  3. Documentation to PCP/referring for new patient:  Sent to PCP at close of office visit  4.  CAD patient on

## 2019-07-30 ENCOUNTER — TELEPHONE (OUTPATIENT)
Dept: CARDIOLOGY CLINIC | Age: 54
End: 2019-07-30

## 2019-07-30 DIAGNOSIS — I42.8 NONISCHEMIC CARDIOMYOPATHY (HCC): Primary | ICD-10-CM

## 2019-07-30 RX ORDER — FUROSEMIDE 20 MG/1
20 TABLET ORAL DAILY
Qty: 60 TABLET | Refills: 0
Start: 2019-07-30 | End: 2019-12-19 | Stop reason: SDUPTHER

## 2019-07-30 ASSESSMENT — ENCOUNTER SYMPTOMS
CHEST TIGHTNESS: 0
COUGH: 0
SHORTNESS OF BREATH: 0

## 2019-07-31 ENCOUNTER — TELEPHONE (OUTPATIENT)
Dept: CARDIOLOGY CLINIC | Age: 54
End: 2019-07-31

## 2019-07-31 NOTE — TELEPHONE ENCOUNTER
While in hospital pt developed hives, so they had her stop lisinopril. Pt still continued to have hives. Pt believes NP told her start Lisinopril again and stop Spironolactone. Pt took Lisinopril 7/30 which made her lightheaded and \"Tunnel vision\". Pt had already taken the Spironolactone before she was told to stop. She will stop today. Please wants to know was she to take the Lisinopril and would that make her feel bad by restarting it. Please advise.

## 2019-08-07 ENCOUNTER — CLINICAL DOCUMENTATION (OUTPATIENT)
Dept: SPIRITUAL SERVICES | Age: 54
End: 2019-08-07

## 2019-08-08 RX ORDER — LISINOPRIL 10 MG/1
10 TABLET ORAL DAILY
Qty: 30 TABLET | Refills: 3 | Status: SHIPPED | OUTPATIENT
Start: 2019-08-08 | End: 2019-12-19 | Stop reason: SDUPTHER

## 2019-08-08 RX ORDER — CARVEDILOL 6.25 MG/1
6.25 TABLET ORAL 2 TIMES DAILY WITH MEALS
Qty: 60 TABLET | Refills: 3 | Status: SHIPPED | OUTPATIENT
Start: 2019-08-08 | End: 2019-12-19 | Stop reason: SDUPTHER

## 2019-08-08 RX ORDER — ATORVASTATIN CALCIUM 80 MG/1
80 TABLET, FILM COATED ORAL NIGHTLY
Qty: 30 TABLET | Refills: 3 | Status: SHIPPED | OUTPATIENT
Start: 2019-08-08 | End: 2019-12-19 | Stop reason: SDUPTHER

## 2019-08-30 ENCOUNTER — CLINICAL DOCUMENTATION (OUTPATIENT)
Dept: SPIRITUAL SERVICES | Age: 54
End: 2019-08-30

## 2019-09-13 ENCOUNTER — CLINICAL DOCUMENTATION (OUTPATIENT)
Dept: SPIRITUAL SERVICES | Age: 54
End: 2019-09-13

## 2019-09-17 ENCOUNTER — HOSPITAL ENCOUNTER (OUTPATIENT)
Dept: NON INVASIVE DIAGNOSTICS | Age: 54
Discharge: HOME | End: 2019-09-17
Payer: COMMERCIAL

## 2019-09-17 DIAGNOSIS — I42.8 NONISCHEMIC CARDIOMYOPATHY (HCC): ICD-10-CM

## 2019-09-17 DIAGNOSIS — I10 ESSENTIAL HYPERTENSION: ICD-10-CM

## 2019-09-17 DIAGNOSIS — I25.10 CORONARY ARTERY DISEASE INVOLVING NATIVE CORONARY ARTERY OF NATIVE HEART WITHOUT ANGINA PECTORIS: ICD-10-CM

## 2019-09-17 DIAGNOSIS — I21.4 NSTEMI (NON-ST ELEVATED MYOCARDIAL INFARCTION) (HCC): ICD-10-CM

## 2019-09-17 PROCEDURE — 93308 TTE F-UP OR LMTD: CPT

## 2019-09-18 ENCOUNTER — CLINICAL DOCUMENTATION (OUTPATIENT)
Dept: SPIRITUAL SERVICES | Age: 54
End: 2019-09-18

## 2019-09-23 ENCOUNTER — TELEPHONE (OUTPATIENT)
Dept: CARDIOLOGY CLINIC | Age: 54
End: 2019-09-23

## 2019-09-23 NOTE — TELEPHONE ENCOUNTER
Attempted to contact pt with echo results per RANDY Stone. VM is not set up. Will try to contact at another date and time to relay echo results.

## 2019-09-23 NOTE — TELEPHONE ENCOUNTER
----- Message from XIAO Akhtar CNP sent at 9/18/2019 12:28 PM EDT -----  Please let her know that her heart pumping is back to normal. Keep follow up appointment with Dr. Amanda Mera in October.   Thank you, XIAO Akhtar CNP

## 2019-10-22 ENCOUNTER — HOSPITAL ENCOUNTER (OUTPATIENT)
Age: 54
Setting detail: OBSERVATION
Discharge: HOME | End: 2019-10-23
Attending: EMERGENCY MEDICINE | Admitting: INTERNAL MEDICINE
Payer: COMMERCIAL

## 2019-10-22 ENCOUNTER — APPOINTMENT (OUTPATIENT)
Dept: CT IMAGING | Age: 54
End: 2019-10-22
Payer: COMMERCIAL

## 2019-10-22 ENCOUNTER — APPOINTMENT (OUTPATIENT)
Dept: GENERAL RADIOLOGY | Age: 54
End: 2019-10-22
Payer: COMMERCIAL

## 2019-10-22 DIAGNOSIS — R06.02 SHORTNESS OF BREATH: Primary | ICD-10-CM

## 2019-10-22 DIAGNOSIS — R07.9 CHEST PAIN, UNSPECIFIED TYPE: ICD-10-CM

## 2019-10-22 DIAGNOSIS — E87.6 HYPOKALEMIA: ICD-10-CM

## 2019-10-22 LAB
A/G RATIO: 1.4 (ref 1.1–2.2)
ALBUMIN SERPL-MCNC: 4.2 G/DL (ref 3.4–5)
ALP BLD-CCNC: 105 U/L (ref 40–129)
ALT SERPL-CCNC: 8 U/L (ref 10–40)
ANION GAP SERPL CALCULATED.3IONS-SCNC: 11 MMOL/L (ref 3–16)
AST SERPL-CCNC: 11 U/L (ref 15–37)
BASOPHILS ABSOLUTE: 0.1 K/UL (ref 0–0.2)
BASOPHILS RELATIVE PERCENT: 1 %
BILIRUB SERPL-MCNC: 0.7 MG/DL (ref 0–1)
BUN BLDV-MCNC: 10 MG/DL (ref 7–20)
CALCIUM SERPL-MCNC: 10 MG/DL (ref 8.3–10.6)
CHLORIDE BLD-SCNC: 95 MMOL/L (ref 99–110)
CO2: 35 MMOL/L (ref 21–32)
CREAT SERPL-MCNC: 0.8 MG/DL (ref 0.6–1.1)
EOSINOPHILS ABSOLUTE: 0.2 K/UL (ref 0–0.6)
EOSINOPHILS RELATIVE PERCENT: 2.6 %
GFR AFRICAN AMERICAN: >60
GFR NON-AFRICAN AMERICAN: >60
GLOBULIN: 3 G/DL
GLUCOSE BLD-MCNC: 141 MG/DL (ref 70–99)
HCT VFR BLD CALC: 40.7 % (ref 36–48)
HEMOGLOBIN: 14.1 G/DL (ref 12–16)
LYMPHOCYTES ABSOLUTE: 3.2 K/UL (ref 1–5.1)
LYMPHOCYTES RELATIVE PERCENT: 34.7 %
MAGNESIUM: 1.9 MG/DL (ref 1.8–2.4)
MCH RBC QN AUTO: 30.7 PG (ref 26–34)
MCHC RBC AUTO-ENTMCNC: 34.6 G/DL (ref 31–36)
MCV RBC AUTO: 88.8 FL (ref 80–100)
MONOCYTES ABSOLUTE: 0.7 K/UL (ref 0–1.3)
MONOCYTES RELATIVE PERCENT: 7.2 %
NEUTROPHILS ABSOLUTE: 5.1 K/UL (ref 1.7–7.7)
NEUTROPHILS RELATIVE PERCENT: 54.5 %
PDW BLD-RTO: 14.7 % (ref 12.4–15.4)
PLATELET # BLD: 286 K/UL (ref 135–450)
PMV BLD AUTO: 9.1 FL (ref 5–10.5)
POTASSIUM SERPL-SCNC: 2.3 MMOL/L (ref 3.5–5.1)
PRO-BNP: 76 PG/ML (ref 0–124)
RBC # BLD: 4.58 M/UL (ref 4–5.2)
SODIUM BLD-SCNC: 141 MMOL/L (ref 136–145)
TOTAL PROTEIN: 7.2 G/DL (ref 6.4–8.2)
TROPONIN: <0.01 NG/ML
WBC # BLD: 9.3 K/UL (ref 4–11)

## 2019-10-22 PROCEDURE — 6360000002 HC RX W HCPCS: Performed by: PHYSICIAN ASSISTANT

## 2019-10-22 PROCEDURE — 83735 ASSAY OF MAGNESIUM: CPT

## 2019-10-22 PROCEDURE — 71046 X-RAY EXAM CHEST 2 VIEWS: CPT

## 2019-10-22 PROCEDURE — 84484 ASSAY OF TROPONIN QUANT: CPT

## 2019-10-22 PROCEDURE — G0378 HOSPITAL OBSERVATION PER HR: HCPCS

## 2019-10-22 PROCEDURE — 6370000000 HC RX 637 (ALT 250 FOR IP): Performed by: NURSE PRACTITIONER

## 2019-10-22 PROCEDURE — 6360000004 HC RX CONTRAST MEDICATION: Performed by: PHYSICIAN ASSISTANT

## 2019-10-22 PROCEDURE — 80053 COMPREHEN METABOLIC PANEL: CPT

## 2019-10-22 PROCEDURE — 83880 ASSAY OF NATRIURETIC PEPTIDE: CPT

## 2019-10-22 PROCEDURE — 99285 EMERGENCY DEPT VISIT HI MDM: CPT

## 2019-10-22 PROCEDURE — 71260 CT THORAX DX C+: CPT

## 2019-10-22 PROCEDURE — 93005 ELECTROCARDIOGRAM TRACING: CPT | Performed by: EMERGENCY MEDICINE

## 2019-10-22 PROCEDURE — 6370000000 HC RX 637 (ALT 250 FOR IP): Performed by: PHYSICIAN ASSISTANT

## 2019-10-22 PROCEDURE — 96372 THER/PROPH/DIAG INJ SC/IM: CPT

## 2019-10-22 PROCEDURE — 85025 COMPLETE CBC W/AUTO DIFF WBC: CPT

## 2019-10-22 RX ORDER — ALBUTEROL SULFATE 90 UG/1
2 AEROSOL, METERED RESPIRATORY (INHALATION) EVERY 4 HOURS PRN
Status: DISCONTINUED | OUTPATIENT
Start: 2019-10-22 | End: 2019-10-23 | Stop reason: HOSPADM

## 2019-10-22 RX ORDER — ATORVASTATIN CALCIUM 80 MG/1
80 TABLET, FILM COATED ORAL NIGHTLY
Status: DISCONTINUED | OUTPATIENT
Start: 2019-10-22 | End: 2019-10-23 | Stop reason: HOSPADM

## 2019-10-22 RX ORDER — PANTOPRAZOLE SODIUM 40 MG/1
40 TABLET, DELAYED RELEASE ORAL
Status: DISCONTINUED | OUTPATIENT
Start: 2019-10-23 | End: 2019-10-23 | Stop reason: HOSPADM

## 2019-10-22 RX ORDER — SODIUM CHLORIDE 0.9 % (FLUSH) 0.9 %
10 SYRINGE (ML) INJECTION PRN
Status: DISCONTINUED | OUTPATIENT
Start: 2019-10-22 | End: 2019-10-23 | Stop reason: HOSPADM

## 2019-10-22 RX ORDER — POTASSIUM CHLORIDE 20 MEQ/1
40 TABLET, EXTENDED RELEASE ORAL ONCE
Status: COMPLETED | OUTPATIENT
Start: 2019-10-22 | End: 2019-10-22

## 2019-10-22 RX ORDER — FLUOXETINE HYDROCHLORIDE 20 MG/1
20 CAPSULE ORAL DAILY
Status: DISCONTINUED | OUTPATIENT
Start: 2019-10-23 | End: 2019-10-23 | Stop reason: HOSPADM

## 2019-10-22 RX ORDER — SODIUM CHLORIDE 0.9 % (FLUSH) 0.9 %
10 SYRINGE (ML) INJECTION EVERY 12 HOURS SCHEDULED
Status: DISCONTINUED | OUTPATIENT
Start: 2019-10-22 | End: 2019-10-23 | Stop reason: HOSPADM

## 2019-10-22 RX ORDER — ASPIRIN 81 MG/1
324 TABLET, CHEWABLE ORAL ONCE
Status: COMPLETED | OUTPATIENT
Start: 2019-10-22 | End: 2019-10-22

## 2019-10-22 RX ORDER — LISINOPRIL 10 MG/1
10 TABLET ORAL DAILY
Status: DISCONTINUED | OUTPATIENT
Start: 2019-10-23 | End: 2019-10-23 | Stop reason: HOSPADM

## 2019-10-22 RX ORDER — ACETAMINOPHEN 500 MG
500 TABLET ORAL EVERY 6 HOURS PRN
Status: DISCONTINUED | OUTPATIENT
Start: 2019-10-22 | End: 2019-10-23 | Stop reason: HOSPADM

## 2019-10-22 RX ORDER — CARVEDILOL 6.25 MG/1
6.25 TABLET ORAL 2 TIMES DAILY WITH MEALS
Status: DISCONTINUED | OUTPATIENT
Start: 2019-10-23 | End: 2019-10-23 | Stop reason: HOSPADM

## 2019-10-22 RX ORDER — PROMETHAZINE HYDROCHLORIDE 25 MG/ML
12.5 INJECTION, SOLUTION INTRAMUSCULAR; INTRAVENOUS ONCE
Status: COMPLETED | OUTPATIENT
Start: 2019-10-22 | End: 2019-10-22

## 2019-10-22 RX ORDER — THIAMINE MONONITRATE (VIT B1) 100 MG
100 TABLET ORAL DAILY
Status: DISCONTINUED | OUTPATIENT
Start: 2019-10-23 | End: 2019-10-23 | Stop reason: HOSPADM

## 2019-10-22 RX ORDER — POTASSIUM CHLORIDE AND SODIUM CHLORIDE 900; 300 MG/100ML; MG/100ML
INJECTION, SOLUTION INTRAVENOUS CONTINUOUS
Status: DISCONTINUED | OUTPATIENT
Start: 2019-10-22 | End: 2019-10-23 | Stop reason: HOSPADM

## 2019-10-22 RX ORDER — ONDANSETRON 2 MG/ML
4 INJECTION INTRAMUSCULAR; INTRAVENOUS ONCE
Status: DISCONTINUED | OUTPATIENT
Start: 2019-10-22 | End: 2019-10-22

## 2019-10-22 RX ORDER — FUROSEMIDE 20 MG/1
20 TABLET ORAL DAILY
Status: DISCONTINUED | OUTPATIENT
Start: 2019-10-23 | End: 2019-10-23 | Stop reason: HOSPADM

## 2019-10-22 RX ORDER — ONDANSETRON 2 MG/ML
4 INJECTION INTRAMUSCULAR; INTRAVENOUS EVERY 6 HOURS PRN
Status: DISCONTINUED | OUTPATIENT
Start: 2019-10-22 | End: 2019-10-23 | Stop reason: HOSPADM

## 2019-10-22 RX ADMIN — ATORVASTATIN CALCIUM 80 MG: 80 TABLET, FILM COATED ORAL at 23:58

## 2019-10-22 RX ADMIN — IOPAMIDOL 75 ML: 755 INJECTION, SOLUTION INTRAVENOUS at 21:26

## 2019-10-22 RX ADMIN — POTASSIUM CHLORIDE AND SODIUM CHLORIDE: 900; 300 INJECTION, SOLUTION INTRAVENOUS at 20:51

## 2019-10-22 RX ADMIN — POTASSIUM CHLORIDE 40 MEQ: 20 TABLET, EXTENDED RELEASE ORAL at 20:51

## 2019-10-22 RX ADMIN — PROMETHAZINE HYDROCHLORIDE 12.5 MG: 25 INJECTION INTRAMUSCULAR; INTRAVENOUS at 20:50

## 2019-10-22 RX ADMIN — ASPIRIN 81 MG 324 MG: 81 TABLET ORAL at 20:51

## 2019-10-22 RX ADMIN — NITROGLYCERIN 0.5 INCH: 20 OINTMENT TOPICAL at 20:50

## 2019-10-22 RX ADMIN — NITROGLYCERIN 1 INCH: 20 OINTMENT TOPICAL at 23:58

## 2019-10-22 ASSESSMENT — PAIN SCALES - GENERAL
PAINLEVEL_OUTOF10: 0
PAINLEVEL_OUTOF10: 7

## 2019-10-22 ASSESSMENT — PAIN DESCRIPTION - ORIENTATION: ORIENTATION: LEFT;RIGHT

## 2019-10-23 VITALS
RESPIRATION RATE: 16 BRPM | TEMPERATURE: 98 F | OXYGEN SATURATION: 95 % | DIASTOLIC BLOOD PRESSURE: 78 MMHG | HEIGHT: 64 IN | SYSTOLIC BLOOD PRESSURE: 132 MMHG | WEIGHT: 154.3 LBS | BODY MASS INDEX: 26.34 KG/M2 | HEART RATE: 70 BPM

## 2019-10-23 LAB
ANION GAP SERPL CALCULATED.3IONS-SCNC: 9 MMOL/L (ref 3–16)
BUN BLDV-MCNC: 14 MG/DL (ref 7–20)
CALCIUM SERPL-MCNC: 9.3 MG/DL (ref 8.3–10.6)
CHLORIDE BLD-SCNC: 106 MMOL/L (ref 99–110)
CHOLESTEROL, TOTAL: 111 MG/DL (ref 0–199)
CO2: 31 MMOL/L (ref 21–32)
CREAT SERPL-MCNC: 0.7 MG/DL (ref 0.6–1.1)
EKG ATRIAL RATE: 61 BPM
EKG ATRIAL RATE: 72 BPM
EKG DIAGNOSIS: NORMAL
EKG DIAGNOSIS: NORMAL
EKG P AXIS: 46 DEGREES
EKG P AXIS: 63 DEGREES
EKG P-R INTERVAL: 150 MS
EKG P-R INTERVAL: 158 MS
EKG Q-T INTERVAL: 438 MS
EKG Q-T INTERVAL: 444 MS
EKG QRS DURATION: 86 MS
EKG QRS DURATION: 92 MS
EKG QTC CALCULATION (BAZETT): 440 MS
EKG QTC CALCULATION (BAZETT): 486 MS
EKG R AXIS: 51 DEGREES
EKG R AXIS: 58 DEGREES
EKG T AXIS: 57 DEGREES
EKG T AXIS: 69 DEGREES
EKG VENTRICULAR RATE: 61 BPM
EKG VENTRICULAR RATE: 72 BPM
GFR AFRICAN AMERICAN: >60
GFR NON-AFRICAN AMERICAN: >60
GLUCOSE BLD-MCNC: 136 MG/DL (ref 70–99)
HCT VFR BLD CALC: 37.8 % (ref 36–48)
HDLC SERPL-MCNC: 31 MG/DL (ref 40–60)
HEMOGLOBIN: 12.6 G/DL (ref 12–16)
LDL CHOLESTEROL CALCULATED: 60 MG/DL
MAGNESIUM: 1.9 MG/DL (ref 1.8–2.4)
MCH RBC QN AUTO: 29.9 PG (ref 26–34)
MCHC RBC AUTO-ENTMCNC: 33.3 G/DL (ref 31–36)
MCV RBC AUTO: 89.8 FL (ref 80–100)
PDW BLD-RTO: 14.8 % (ref 12.4–15.4)
PLATELET # BLD: 238 K/UL (ref 135–450)
PMV BLD AUTO: 9 FL (ref 5–10.5)
POTASSIUM REFLEX MAGNESIUM: 3.1 MMOL/L (ref 3.5–5.1)
RBC # BLD: 4.2 M/UL (ref 4–5.2)
SODIUM BLD-SCNC: 146 MMOL/L (ref 136–145)
TRIGL SERPL-MCNC: 102 MG/DL (ref 0–150)
TROPONIN: <0.01 NG/ML
TROPONIN: <0.01 NG/ML
VLDLC SERPL CALC-MCNC: 20 MG/DL
WBC # BLD: 7.2 K/UL (ref 4–11)

## 2019-10-23 PROCEDURE — 80048 BASIC METABOLIC PNL TOTAL CA: CPT

## 2019-10-23 PROCEDURE — 99214 OFFICE O/P EST MOD 30 MIN: CPT | Performed by: INTERNAL MEDICINE

## 2019-10-23 PROCEDURE — 93005 ELECTROCARDIOGRAM TRACING: CPT | Performed by: NURSE PRACTITIONER

## 2019-10-23 PROCEDURE — 36415 COLL VENOUS BLD VENIPUNCTURE: CPT

## 2019-10-23 PROCEDURE — 6360000002 HC RX W HCPCS: Performed by: PHYSICIAN ASSISTANT

## 2019-10-23 PROCEDURE — 93010 ELECTROCARDIOGRAM REPORT: CPT | Performed by: INTERNAL MEDICINE

## 2019-10-23 PROCEDURE — 6370000000 HC RX 637 (ALT 250 FOR IP): Performed by: NURSE PRACTITIONER

## 2019-10-23 PROCEDURE — 84484 ASSAY OF TROPONIN QUANT: CPT

## 2019-10-23 PROCEDURE — 85027 COMPLETE CBC AUTOMATED: CPT

## 2019-10-23 PROCEDURE — G0378 HOSPITAL OBSERVATION PER HR: HCPCS

## 2019-10-23 PROCEDURE — 2580000003 HC RX 258: Performed by: NURSE PRACTITIONER

## 2019-10-23 PROCEDURE — 90686 IIV4 VACC NO PRSV 0.5 ML IM: CPT | Performed by: INTERNAL MEDICINE

## 2019-10-23 PROCEDURE — 6360000002 HC RX W HCPCS: Performed by: INTERNAL MEDICINE

## 2019-10-23 PROCEDURE — 80061 LIPID PANEL: CPT

## 2019-10-23 PROCEDURE — 6360000002 HC RX W HCPCS: Performed by: NURSE PRACTITIONER

## 2019-10-23 PROCEDURE — 83735 ASSAY OF MAGNESIUM: CPT

## 2019-10-23 PROCEDURE — 96372 THER/PROPH/DIAG INJ SC/IM: CPT

## 2019-10-23 PROCEDURE — 93308 TTE F-UP OR LMTD: CPT

## 2019-10-23 PROCEDURE — G0008 ADMIN INFLUENZA VIRUS VAC: HCPCS | Performed by: INTERNAL MEDICINE

## 2019-10-23 RX ADMIN — LISINOPRIL 10 MG: 10 TABLET ORAL at 09:52

## 2019-10-23 RX ADMIN — PANTOPRAZOLE SODIUM 40 MG: 40 TABLET, DELAYED RELEASE ORAL at 06:28

## 2019-10-23 RX ADMIN — ASPIRIN 325 MG: 325 TABLET, DELAYED RELEASE ORAL at 09:52

## 2019-10-23 RX ADMIN — ENOXAPARIN SODIUM 40 MG: 40 INJECTION SUBCUTANEOUS at 09:54

## 2019-10-23 RX ADMIN — Medication 10 ML: at 09:56

## 2019-10-23 RX ADMIN — POTASSIUM CHLORIDE AND SODIUM CHLORIDE: 900; 300 INJECTION, SOLUTION INTRAVENOUS at 11:32

## 2019-10-23 RX ADMIN — CARVEDILOL 6.25 MG: 6.25 TABLET, FILM COATED ORAL at 09:53

## 2019-10-23 RX ADMIN — FLUOXETINE 20 MG: 20 CAPSULE ORAL at 09:52

## 2019-10-23 RX ADMIN — Medication 100 MG: at 09:53

## 2019-10-23 RX ADMIN — FUROSEMIDE 20 MG: 20 TABLET ORAL at 09:52

## 2019-10-23 RX ADMIN — INFLUENZA A VIRUS A/BRISBANE/02/2018 IVR-190 (H1N1) ANTIGEN (PROPIOLACTONE INACTIVATED), INFLUENZA A VIRUS A/KANSAS/14/2017 X-327 (H3N2) ANTIGEN (PROPIOLACTONE INACTIVATED), INFLUENZA B VIRUS B/MARYLAND/15/2016 ANTIGEN (PROPIOLACTONE INACTIVATED), INFLUENZA B VIRUS B/PHUKET/3073/2013 BVR-1B ANTIGEN (PROPIOLACTONE INACTIVATED) 0.5 ML: 15; 15; 15; 15 INJECTION, SUSPENSION INTRAMUSCULAR at 09:53

## 2019-10-23 ASSESSMENT — PAIN SCALES - GENERAL: PAINLEVEL_OUTOF10: 0

## 2019-10-25 ENCOUNTER — CLINICAL DOCUMENTATION (OUTPATIENT)
Dept: SPIRITUAL SERVICES | Age: 54
End: 2019-10-25

## 2019-12-12 ENCOUNTER — OFFICE VISIT (OUTPATIENT)
Dept: ORTHOPEDIC SURGERY | Age: 54
End: 2019-12-12
Payer: COMMERCIAL

## 2019-12-12 VITALS — HEIGHT: 64 IN | WEIGHT: 154.32 LBS | BODY MASS INDEX: 26.35 KG/M2

## 2019-12-12 DIAGNOSIS — M25.561 RIGHT KNEE PAIN, UNSPECIFIED CHRONICITY: ICD-10-CM

## 2019-12-12 DIAGNOSIS — M17.11 ARTHRITIS OF RIGHT KNEE: Primary | ICD-10-CM

## 2019-12-12 PROCEDURE — 4004F PT TOBACCO SCREEN RCVD TLK: CPT | Performed by: PHYSICIAN ASSISTANT

## 2019-12-12 PROCEDURE — G8598 ASA/ANTIPLAT THER USED: HCPCS | Performed by: PHYSICIAN ASSISTANT

## 2019-12-12 PROCEDURE — 3017F COLORECTAL CA SCREEN DOC REV: CPT | Performed by: PHYSICIAN ASSISTANT

## 2019-12-12 PROCEDURE — G8482 FLU IMMUNIZE ORDER/ADMIN: HCPCS | Performed by: PHYSICIAN ASSISTANT

## 2019-12-12 PROCEDURE — 99213 OFFICE O/P EST LOW 20 MIN: CPT | Performed by: PHYSICIAN ASSISTANT

## 2019-12-12 PROCEDURE — G8417 CALC BMI ABV UP PARAM F/U: HCPCS | Performed by: PHYSICIAN ASSISTANT

## 2019-12-12 PROCEDURE — G8427 DOCREV CUR MEDS BY ELIG CLIN: HCPCS | Performed by: PHYSICIAN ASSISTANT

## 2019-12-18 ENCOUNTER — TELEPHONE (OUTPATIENT)
Dept: CARDIOLOGY CLINIC | Age: 54
End: 2019-12-18

## 2019-12-18 ENCOUNTER — HOSPITAL ENCOUNTER (OUTPATIENT)
Age: 54
Discharge: HOME | End: 2019-12-18
Payer: COMMERCIAL

## 2019-12-18 DIAGNOSIS — I42.8 NONISCHEMIC CARDIOMYOPATHY (HCC): Primary | ICD-10-CM

## 2019-12-18 DIAGNOSIS — R06.02 SHORTNESS OF BREATH: ICD-10-CM

## 2019-12-18 LAB
ANION GAP SERPL CALCULATED.3IONS-SCNC: 11 MMOL/L (ref 3–16)
BUN BLDV-MCNC: 15 MG/DL (ref 7–20)
CALCIUM SERPL-MCNC: 9.5 MG/DL (ref 8.3–10.6)
CHLORIDE BLD-SCNC: 97 MMOL/L (ref 99–110)
CO2: 30 MMOL/L (ref 21–32)
CREAT SERPL-MCNC: 1 MG/DL (ref 0.6–1.1)
GFR AFRICAN AMERICAN: >60
GFR NON-AFRICAN AMERICAN: 57
GLUCOSE BLD-MCNC: 134 MG/DL (ref 70–99)
POTASSIUM SERPL-SCNC: 3.2 MMOL/L (ref 3.5–5.1)
PRO-BNP: 57 PG/ML (ref 0–124)
SODIUM BLD-SCNC: 138 MMOL/L (ref 136–145)

## 2019-12-18 PROCEDURE — 80048 BASIC METABOLIC PNL TOTAL CA: CPT

## 2019-12-18 PROCEDURE — 83880 ASSAY OF NATRIURETIC PEPTIDE: CPT

## 2019-12-19 ENCOUNTER — OFFICE VISIT (OUTPATIENT)
Dept: CARDIOLOGY CLINIC | Age: 54
End: 2019-12-19
Payer: COMMERCIAL

## 2019-12-19 VITALS
HEART RATE: 68 BPM | BODY MASS INDEX: 26.46 KG/M2 | SYSTOLIC BLOOD PRESSURE: 112 MMHG | OXYGEN SATURATION: 93 % | DIASTOLIC BLOOD PRESSURE: 56 MMHG | WEIGHT: 155 LBS | HEIGHT: 64 IN

## 2019-12-19 DIAGNOSIS — R06.02 SHORTNESS OF BREATH: ICD-10-CM

## 2019-12-19 DIAGNOSIS — Z72.0 TOBACCO ABUSE: ICD-10-CM

## 2019-12-19 DIAGNOSIS — I25.10 CORONARY ARTERY DISEASE INVOLVING NATIVE CORONARY ARTERY OF NATIVE HEART WITHOUT ANGINA PECTORIS: ICD-10-CM

## 2019-12-19 DIAGNOSIS — E87.6 HYPOKALEMIA: ICD-10-CM

## 2019-12-19 DIAGNOSIS — I50.32 CHRONIC DIASTOLIC HEART FAILURE (HCC): Primary | ICD-10-CM

## 2019-12-19 DIAGNOSIS — I42.8 NONISCHEMIC CARDIOMYOPATHY (HCC): ICD-10-CM

## 2019-12-19 PROCEDURE — 99214 OFFICE O/P EST MOD 30 MIN: CPT | Performed by: NURSE PRACTITIONER

## 2019-12-19 RX ORDER — POTASSIUM CHLORIDE 750 MG/1
10 TABLET, FILM COATED, EXTENDED RELEASE ORAL
COMMUNITY
End: 2019-12-19 | Stop reason: DRUGHIGH

## 2019-12-19 RX ORDER — FUROSEMIDE 20 MG/1
20 TABLET ORAL SEE ADMIN INSTRUCTIONS
Qty: 60 TABLET | Refills: 5 | Status: SHIPPED | OUTPATIENT
Start: 2019-12-19 | End: 2020-11-03 | Stop reason: SDUPTHER

## 2019-12-19 RX ORDER — RANITIDINE 150 MG/1
TABLET ORAL
COMMUNITY
Start: 2019-10-16 | End: 2020-05-13

## 2019-12-19 RX ORDER — ATORVASTATIN CALCIUM 80 MG/1
80 TABLET, FILM COATED ORAL NIGHTLY
Qty: 30 TABLET | Refills: 5 | Status: SHIPPED | OUTPATIENT
Start: 2019-12-19 | End: 2020-06-23

## 2019-12-19 RX ORDER — POTASSIUM CHLORIDE 1500 MG/1
20 TABLET, FILM COATED, EXTENDED RELEASE ORAL DAILY
Qty: 30 TABLET | Refills: 5 | Status: SHIPPED | OUTPATIENT
Start: 2019-12-19 | End: 2020-07-08

## 2019-12-19 RX ORDER — POTASSIUM CHLORIDE 1500 MG/1
20 TABLET, FILM COATED, EXTENDED RELEASE ORAL DAILY
Qty: 30 TABLET | Refills: 5 | Status: SHIPPED | COMMUNITY
Start: 2019-12-19 | End: 2019-12-19 | Stop reason: SDUPTHER

## 2019-12-19 RX ORDER — NICOTINE 21 MG/24HR
1 PATCH, TRANSDERMAL 24 HOURS TRANSDERMAL DAILY
Qty: 42 PATCH | Refills: 0 | Status: SHIPPED | OUTPATIENT
Start: 2019-12-19 | End: 2020-05-13

## 2019-12-19 RX ORDER — LISINOPRIL 10 MG/1
10 TABLET ORAL DAILY
Qty: 30 TABLET | Refills: 5 | Status: SHIPPED | OUTPATIENT
Start: 2019-12-19 | End: 2020-05-04 | Stop reason: SDUPTHER

## 2019-12-19 RX ORDER — CARVEDILOL 6.25 MG/1
6.25 TABLET ORAL 2 TIMES DAILY WITH MEALS
Qty: 60 TABLET | Refills: 5 | Status: SHIPPED | OUTPATIENT
Start: 2019-12-19 | End: 2020-06-09

## 2019-12-19 ASSESSMENT — ENCOUNTER SYMPTOMS
SHORTNESS OF BREATH: 1
COUGH: 1
CHEST TIGHTNESS: 0

## 2020-01-09 ENCOUNTER — TELEPHONE (OUTPATIENT)
Dept: ORTHOPEDIC SURGERY | Age: 55
End: 2020-01-09

## 2020-01-09 NOTE — TELEPHONE ENCOUNTER
01/09/2020  YUMIKO    RIGHT  KNEE. APPROVED #  [id number]. DATES: 01/09/2020 - 07/09/2020. PER FAX FROM [employer].  AP

## 2020-04-22 ENCOUNTER — TELEPHONE (OUTPATIENT)
Dept: CARDIOLOGY CLINIC | Age: 55
End: 2020-04-22

## 2020-04-24 NOTE — TELEPHONE ENCOUNTER
Called patient and left message in regard to Vitamin B12, informed her she could get it over the counter. Also she needs to make a follow up appointment with Dr. Ellen Cuevas. Left message to call office back to make follow up appointment.  XIAO Enriquez - CNP

## 2020-05-04 ENCOUNTER — VIRTUAL VISIT (OUTPATIENT)
Dept: CARDIOLOGY CLINIC | Age: 55
End: 2020-05-04
Payer: COMMERCIAL

## 2020-05-04 VITALS
OXYGEN SATURATION: 96 % | WEIGHT: 150.2 LBS | HEIGHT: 64 IN | SYSTOLIC BLOOD PRESSURE: 150 MMHG | DIASTOLIC BLOOD PRESSURE: 131 MMHG | BODY MASS INDEX: 25.64 KG/M2 | HEART RATE: 68 BPM

## 2020-05-04 PROCEDURE — 3017F COLORECTAL CA SCREEN DOC REV: CPT | Performed by: INTERNAL MEDICINE

## 2020-05-04 PROCEDURE — 99214 OFFICE O/P EST MOD 30 MIN: CPT | Performed by: INTERNAL MEDICINE

## 2020-05-04 PROCEDURE — G8427 DOCREV CUR MEDS BY ELIG CLIN: HCPCS | Performed by: INTERNAL MEDICINE

## 2020-05-04 RX ORDER — OMEPRAZOLE 20 MG/1
20 CAPSULE, DELAYED RELEASE ORAL DAILY
COMMUNITY

## 2020-05-04 RX ORDER — LISINOPRIL 10 MG/1
10 TABLET ORAL 2 TIMES DAILY
Qty: 180 TABLET | Refills: 3 | Status: SHIPPED | OUTPATIENT
Start: 2020-05-04 | End: 2021-02-09 | Stop reason: SDUPTHER

## 2020-05-04 NOTE — LETTER
2020    Follow-up; Chest Pain (started a little on Thursday); Edema (at times, but the extra water pill helps); and Coronary Artery Disease       TELEHEALTH EVALUATION -- Audio/Visual (During [id number] public health emergency)    HPI: Citlaly Hernandez is a 63 y.o. female who is here for a virtual visit today for follow up for coronary artery disease- left heart cath on 19 due to non-obstructive coronary artery disease, LVEF 30% by LV gram (echo showed EF at 35%), cough elevated troponin's. Echocardiogram in 10/2019 showed EF has improved to 55-60%. She has a history of alcohol use. Smokes daily. Today she states she has been feeling well until this past Friday. She started to feel hot and then threw up. She had some chest pain prior on Thursday, now resolved. Her blood pressure at home last night was 120/80. She does not take her blood pressure on a regular basis. She has not been as active recently. Patient currently denies any weight gain, edema, palpitations,  shortness of breath, dizziness, and syncope. Citlaly Hernandez (:  1965) has requested an audio/video evaluation for the following concern(s): Follow up for CAD, chest pain, Hypertension and cardiomyopathy. [x] Medications and dosages reviewed. ROS:  [x]Full ROS obtained and negative except as mentioned in HPI    Prior to Visit Medications    Medication Sig Taking?  Authorizing Provider   omeprazole (PRILOSEC) 20 MG delayed release capsule Take 20 mg by mouth daily Yes Historical Provider, MD   MULTIPLE VITAMIN PO Take by mouth Yes Historical Provider, MD   potassium chloride (KLOR-CON M) 20 MEQ TBCR extended release tablet Take 1 tablet by mouth daily Yes XIAO Magaña CNP   lisinopril (PRINIVIL;ZESTRIL) 10 MG tablet Take 1 tablet by mouth daily Yes XIAO Magaña CNP   atorvastatin (LIPITOR) 80 MG tablet Take 1 tablet by mouth nightly Yes XIAO Magaña CNP carvedilol (COREG) 6.25 MG tablet Take 1 tablet by mouth 2 times daily (with meals) Yes Ursula Heimlich, APRN - CNP   furosemide (LASIX) 20 MG tablet Take 1 tablet by mouth See Admin Instructions Daily and as needed if weight gain, increased shortness of breath, increased swelling. Yes Ursula Heimlich, APRN - CNP   aspirin 325 MG EC tablet Take 1 tablet by mouth daily Yes XIAO Hanley CNP   acetaminophen (TYLENOL) 500 MG tablet Take 500 mg by mouth every 6 hours as needed for Pain Yes Historical Provider, MD   FLUoxetine (PROZAC) 20 MG capsule Take 20 mg by mouth daily Yes Historical Provider, MD   albuterol (PROVENTIL HFA;VENTOLIN HFA) 108 (90 BASE) MCG/ACT inhaler Use 2 puffs 4 times daily for 7 days then as needed for wheezing. Dispense with Spacer and instruct in use. At patient's preference may use 60 dose MDI. May Sub Pro-Air or Proventil as needed per insurance.  Yes Rohan Wisdom PA-C   ranitidine (ZANTAC) 150 MG tablet   Historical Provider, MD   nicotine (NICODERM CQ) 21 MG/24HR Place 1 patch onto the skin daily  Ursula Heimlich, APRN - CNP   lidocaine 4 % external patch Place 1 patch onto the skin daily  Patient not taking: Reported on 12/19/2019  XIAO Hanley CNP   thiamine 100 MG tablet Take 1 tablet by mouth daily  Patient not taking: Reported on 5/4/2020  XIAO Hanley CNP   lansoprazole (PREVACID) 30 MG delayed release capsule Take 30 mg by mouth daily  Historical Provider, MD       Social History     Tobacco Use    Smoking status: Current Every Day Smoker     Packs/day: 0.50     Years: 40.00     Pack years: 20.00     Types: Cigarettes    Smokeless tobacco: Never Used    Tobacco comment: 6-10 cig daily   Substance Use Topics    Alcohol use: Not Currently     Comment: 6 X per year    Drug use: Not Currently     Frequency: 1.0 times per week     Types: Marijuana     Comment: none since 2014          Allergies   Allergen Reactions    Aldactone [Spironolactone]      Hives  Morphine Nausea And Vomiting   ,   Past Medical History:   Diagnosis Date    Arthritis     Asthma     Cancer ([facility] [address]     skin \"precancerous\"    Cystitis, interstitial     Diabetes mellitus ([facility] [address]     borderline    DUB (dysfunctional uterine bleeding) 3/2013    GERD (gastroesophageal reflux disease)     Hypercholesterolemia     no meds currently    Hypertension     Kidney stones     Low blood potassium     PONV (postoperative nausea and vomiting)     Sleep apnea     never fitted for an appliance   ,   Past Surgical History:   Procedure Laterality Date    APPENDECTOMY      BLADDER SURGERY  2010    X 3     SECTION      COLONOSCOPY  2017    Polyps    DIAGNOSTIC CARDIAC CATH LAB PROCEDURE      ECTOPIC PREGNANCY SURGERY      HYSTERECTOMY N/A 13    ROBOTIC ASSISTED TOTAL LAPAROSCOPIC HYSTERECTOMY    KNEE ARTHROSCOPY Right 2017    LITHOTRIPSY      X 3    SKIN BIOPSY     ,   Social History     Tobacco Use    Smoking status: Current Every Day Smoker     Packs/day: 0.50     Years: 40.00     Pack years: 20.00     Types: Cigarettes    Smokeless tobacco: Never Used    Tobacco comment: 6-10 cig daily   Substance Use Topics    Alcohol use: Not Currently     Comment: 6 X per year    Drug use: Not Currently     Frequency: 1.0 times per week     Types: Marijuana     Comment: none since    ,   Family History   Problem Relation Age of Onset    High Blood Pressure Mother     Cancer Father         lung    High Blood Pressure Father    ,   Immunization History   Administered Date(s) Administered    Influenza, Quadv, IM, PF (6 mo and older Fluzone, Flulaval, Fluarix, and 3 yrs and older Afluria) 10/23/2019    Pneumococcal Conjugate 7-valent (William Chungayne) 2011   ,   Health Maintenance   Topic Date Due    Hepatitis C screen  1965    HIV screen  1979    Cervical cancer screen  1985    Breast cancer screen  2014

## 2020-05-04 NOTE — PATIENT INSTRUCTIONS
Plan:   Smoking cessation discussed   Check blood pressure a few times today- call with readings above 140/90  Increase Lisinopril to 10 mg twice a day   Labs- BMP   Continue other medications   Check blood pressure at home weekly  Regular exercise and following a healthy diet encouraged   Follow up with Daniel Rehman CNP in 6 months

## 2020-05-04 NOTE — PROGRESS NOTES
 A1C test (Diabetic or Prediabetic)  07/13/2020    Lipid screen  10/23/2020    Potassium monitoring  12/18/2020    Creatinine monitoring  12/18/2020    DTaP/Tdap/Td vaccine (2 - Td) 07/25/2026    Flu vaccine  Completed    Pneumococcal 0-64 years Vaccine  Completed    Hepatitis A vaccine  Aged Out    Hepatitis B vaccine  Aged Out    Hib vaccine  Aged Out    Meningococcal (ACWY) vaccine  Aged Out     BP (!) 150/131   Pulse 68   Ht 5' 4\" (1.626 m)   Wt 150 lb 3.2 oz (68.1 kg)   LMP 05/29/2013   SpO2 96%   BMI 25.78 kg/m²      Repeat /116 pt believes machine malfunction    Unable to connect video. Phone visit performed. Left heart cath 7/13/19  Procedures: LHC, LVG, CA, sedation     LM <20%  LAD 30%  Cx <20%  RCA 30%  LVEF 30%  Nonischemic cardiomyopathy       Limited echocardiogram 10/23/19  Summary   Limited only f/u for LVEF. Normal left ventricular systolic function with ejection fraction of 55-60%. No regional wall motion abnormalites are seen. Compared to previous study from 9- no changes noted in left   ventricular function. ASSESSMENT:  Chest pain, atypical. No recent  Shortness of breath, I suspect this is not cardiac. No significant   History of nonischemic cardiomyopathy, subsequently resolved on most  recent echocardiogram in 09/2019. Hypertension suboptimal. Pt checking at home. She believes machine inaccurate  Hyperlipidemia. Diabetes. Smoking    Plan:   Smoking cessation discussed   Check blood pressure a few times today- call if readings above 140/90  Increase Lisinopril to 10 mg twice a day   Labs- BMP   Continue other medications   Check blood pressure at home weekly  Regular exercise and following a healthy diet encouraged   Follow up with Sneha Amato CNP in 6 months         Saintclair Patch is a 63 y.o. female being evaluated by a Virtual Visit (video visit) encounter to address concerns as mentioned above. A caregiver was present when appropriate.  Due to

## 2020-05-13 ENCOUNTER — VIRTUAL VISIT (OUTPATIENT)
Dept: FAMILY MEDICINE CLINIC | Age: 55
End: 2020-05-13
Payer: COMMERCIAL

## 2020-05-13 PROBLEM — Z80.1 FAMILY HISTORY OF LUNG CANCER: Status: ACTIVE | Noted: 2020-05-13

## 2020-05-13 PROBLEM — F32.4 MAJOR DEPRESSIVE DISORDER WITH SINGLE EPISODE, IN PARTIAL REMISSION (HCC): Status: ACTIVE | Noted: 2020-05-13

## 2020-05-13 PROBLEM — Z80.0 FAMILY HISTORY OF STOMACH CANCER: Status: ACTIVE | Noted: 2020-05-13

## 2020-05-13 PROBLEM — K62.5 RECTAL BLEEDING: Status: ACTIVE | Noted: 2020-05-13

## 2020-05-13 PROBLEM — Z86.010 HISTORY OF COLON POLYPS: Status: ACTIVE | Noted: 2020-05-13

## 2020-05-13 PROBLEM — Z86.0100 HISTORY OF COLON POLYPS: Status: ACTIVE | Noted: 2020-05-13

## 2020-05-13 PROBLEM — R73.01 IFG (IMPAIRED FASTING GLUCOSE): Status: ACTIVE | Noted: 2020-05-13

## 2020-05-13 PROBLEM — Z87.898 HISTORY OF ALCOHOL USE: Status: ACTIVE | Noted: 2020-05-13

## 2020-05-13 PROBLEM — K21.9 GASTROESOPHAGEAL REFLUX DISEASE: Status: ACTIVE | Noted: 2020-05-13

## 2020-05-13 PROBLEM — E53.9 VITAMIN B DEFICIENCY: Status: ACTIVE | Noted: 2020-05-13

## 2020-05-13 PROBLEM — I10 ESSENTIAL HYPERTENSION: Status: ACTIVE | Noted: 2020-05-13

## 2020-05-13 PROCEDURE — 99204 OFFICE O/P NEW MOD 45 MIN: CPT | Performed by: NURSE PRACTITIONER

## 2020-05-13 PROCEDURE — 96160 PT-FOCUSED HLTH RISK ASSMT: CPT | Performed by: NURSE PRACTITIONER

## 2020-05-13 RX ORDER — FLUTICASONE PROPIONATE 220 UG/1
2 AEROSOL, METERED RESPIRATORY (INHALATION) 2 TIMES DAILY
Qty: 1 INHALER | Refills: 5 | Status: SHIPPED | OUTPATIENT
Start: 2020-05-13 | End: 2021-03-02 | Stop reason: ALTCHOICE

## 2020-05-13 RX ORDER — LANOLIN ALCOHOL/MO/W.PET/CERES
100 CREAM (GRAM) TOPICAL DAILY
Qty: 30 TABLET | Refills: 2 | Status: SHIPPED | OUTPATIENT
Start: 2020-05-13 | End: 2020-08-20

## 2020-05-13 ASSESSMENT — PATIENT HEALTH QUESTIONNAIRE - PHQ9
6. FEELING BAD ABOUT YOURSELF - OR THAT YOU ARE A FAILURE OR HAVE LET YOURSELF OR YOUR FAMILY DOWN: 0
5. POOR APPETITE OR OVEREATING: 0
3. TROUBLE FALLING OR STAYING ASLEEP: 2
7. TROUBLE CONCENTRATING ON THINGS, SUCH AS READING THE NEWSPAPER OR WATCHING TELEVISION: 0
8. MOVING OR SPEAKING SO SLOWLY THAT OTHER PEOPLE COULD HAVE NOTICED. OR THE OPPOSITE, BEING SO FIGETY OR RESTLESS THAT YOU HAVE BEEN MOVING AROUND A LOT MORE THAN USUAL: 0
1. LITTLE INTEREST OR PLEASURE IN DOING THINGS: 0
9. THOUGHTS THAT YOU WOULD BE BETTER OFF DEAD, OR OF HURTING YOURSELF: 0
10. IF YOU CHECKED OFF ANY PROBLEMS, HOW DIFFICULT HAVE THESE PROBLEMS MADE IT FOR YOU TO DO YOUR WORK, TAKE CARE OF THINGS AT HOME, OR GET ALONG WITH OTHER PEOPLE: 0
SUM OF ALL RESPONSES TO PHQ QUESTIONS 1-9: 2
2. FEELING DOWN, DEPRESSED OR HOPELESS: 0
SUM OF ALL RESPONSES TO PHQ QUESTIONS 1-9: 2
4. FEELING TIRED OR HAVING LITTLE ENERGY: 0
SUM OF ALL RESPONSES TO PHQ9 QUESTIONS 1 & 2: 0

## 2020-05-13 ASSESSMENT — ENCOUNTER SYMPTOMS
ABDOMINAL PAIN: 0
RECTAL PAIN: 0
DIARRHEA: 0
WHEEZING: 0
ANAL BLEEDING: 1
APNEA: 0
VOMITING: 0
EYES NEGATIVE: 1
COUGH: 0
NAUSEA: 0
CHEST TIGHTNESS: 0
CHOKING: 0
ABDOMINAL DISTENTION: 0
SHORTNESS OF BREATH: 1
CONSTIPATION: 0
ALLERGIC/IMMUNOLOGIC NEGATIVE: 1

## 2020-05-13 NOTE — PROGRESS NOTES
Diffusing capacity is normal.  Comparison: In comparison with the test of 02/26/2008, There have been no significant   changes. DARELL--had test years ago but never went back for titration  Never went to see Dr Delores Hernandez   The patient feels that since she has lost weight this is not. GERD on prilosec x several years  Had EGD done in past  If she doesn't use medication/PPI  for more than one day then has significant heartburn. Patient would like to see GI--Dr Arie Garcia (did EGD 1/2017 but he has retired)  Has a lot of belching present  + bright red intermittent rectal bleeding for years but patient feels it is related to hemorrhoids. Has [facility] of stomach cancer in paternal uncle   [facility] of lung cancer--father. Patient had colonoscopy 3/23/2017 with Dr Arie Garcia and was found to have 4-5 polyps and it was recommended she have a repeat colonoscopy in 5 years    She has a history of alcohol abuse and was on thiamine 100 mg daily and is requesting a refill. No recent vitamin D testing has been done. Denies current use. On prozac x several years for depression. Patient has used ativan in past PRN. Current complaints include: none. She denies depressed mood, tearfulness, feelings of hopelessness, feelings of worthlessness/excessive guilt, fatigue, changes in appetite/weight, difficulty concentrating, irritability, excessive worry, restlessness, panic attacks, obsessive thoughts, compulsive behaviors, increased use of drugs or alcohol, suicidal thoughts or behavior and impaired memory. Symptoms/signs of sri: none. External stressors: nothing new. Current treatment includes: Prozac- 20 mg daily. Medication side effects: none.     PHQ-9  5/13/2020   Little interest or pleasure in doing things 0   Feeling down, depressed, or hopeless 0   Trouble falling or staying asleep, or sleeping too much 2   Feeling tired or having little energy 0   Poor appetite or overeating 0   Feeling bad about yourself - or that you are a Allergic/Immunologic: Negative. Neurological: Negative. Negative for dizziness, seizures, syncope, weakness and light-headedness. Hematological: Negative. Psychiatric/Behavioral: Positive for sleep disturbance. Negative for agitation, behavioral problems, confusion, decreased concentration, dysphoric mood, hallucinations, self-injury and suicidal ideas. The patient is not nervous/anxious and is not hyperactive. All other systems reviewed and are negative. Prior to Visit Medications    Medication Sig Taking? Authorizing Provider   omeprazole (PRILOSEC) 20 MG delayed release capsule Take 20 mg by mouth daily Yes Historical Provider, MD   MULTIPLE VITAMIN PO Take by mouth Yes Historical Provider, MD   lisinopril (PRINIVIL;ZESTRIL) 10 MG tablet Take 1 tablet by mouth 2 times daily Yes Wendy Boswell MD   potassium chloride (KLOR-CON M) 20 MEQ TBCR extended release tablet Take 1 tablet by mouth daily Yes XIAO Che CNP   atorvastatin (LIPITOR) 80 MG tablet Take 1 tablet by mouth nightly Yes XIAO Che CNP   carvedilol (COREG) 6.25 MG tablet Take 1 tablet by mouth 2 times daily (with meals) Yes XIAO Che CNP   furosemide (LASIX) 20 MG tablet Take 1 tablet by mouth See Admin Instructions Daily and as needed if weight gain, increased shortness of breath, increased swelling. Yes XIAO Che CNP   aspirin 325 MG EC tablet Take 1 tablet by mouth daily Yes XIAO Mohamud CNP   acetaminophen (TYLENOL) 500 MG tablet Take 500 mg by mouth every 6 hours as needed for Pain Yes Historical Provider, MD   FLUoxetine (PROZAC) 20 MG capsule Take 20 mg by mouth daily Yes Historical Provider, MD   albuterol (PROVENTIL HFA;VENTOLIN HFA) 108 (90 BASE) MCG/ACT inhaler Use 2 puffs 4 times daily for 7 days then as needed for wheezing. Dispense with Spacer and instruct in use. At patient's preference may use 60 dose MDI. May Sub Pro-Air or Proventil as needed per insurance.  Yes medications. Barriers to compliance discussed. All patient questions answered. Pt voiced understanding. 3. Nonischemic cardiomyopathy ([address]  Continue with lisinopril and coreg  Continue with cardio    4. Coronary artery disease involving native coronary artery of native heart without angina pectoris  Continue with statin  - Lipid Panel; Future  F/u with cardio as recommended    5. Essential hypertension  Her lisinopril was increased by cardiology on 5/4/2020. Recommend the patient monitor her blood pressure on a daily basis and report those readings to cardiology or to this office in approximately 2 to 4 weeks  - CBC Auto Differential; Future  - Comprehensive Metabolic Panel; Future    6. Rectal bleeding  Needs evaluation and recommend f/u with GI  - PARVIZ Lechuga MD, Gastroenterology [facility]    7. Gastroesophageal reflux disease, esophagitis presence not specified  Continues on PPI  Has family hx gastric cancer  Needs GI evaluation  - CBC Auto Differential; Future  - Comprehensive Metabolic Panel; Future  - PARVIZ Lechuga MD, Gastroenterology [facility]    8. IFG (impaired fasting glucose)  Last a1c was 5.9  Not on medication  - Hemoglobin A1C; Future  Discussed dietary measures    9. Sleep apnea, unspecified type  Recommend sleep evaluation but patient declined    10. Vitamin B detficiency  Has hx alcohol use and vitamin b deficiency may have been r/t this. Patient denies current alcohol use    - thiamine 100 MG tablet; Take 1 tablet by mouth daily  Dispense: 30 tablet; Refill: 2  - Vitamin B12 & Folate; Future  - VITAMIN B1; Future    11. History of colon polyps  Had colonoscopy 2017 with multiple polyps, not due for colonoscopy for 5 years but has been having rectal bleeding. Refer to  - PARVIZ Lechuga MD, Gastroenterology [facility]    12.  Family history of stomach cancer  See note above  - PARVIZ Lechuga MD, Gastroenterology,

## 2020-05-19 ENCOUNTER — TELEPHONE (OUTPATIENT)
Dept: FAMILY MEDICINE CLINIC | Age: 55
End: 2020-05-19

## 2020-05-20 ENCOUNTER — HOSPITAL ENCOUNTER (OUTPATIENT)
Age: 55
Discharge: HOME | End: 2020-05-20
Payer: COMMERCIAL

## 2020-05-20 ENCOUNTER — TELEPHONE (OUTPATIENT)
Dept: CARDIOLOGY CLINIC | Age: 55
End: 2020-05-20

## 2020-05-20 LAB
ANION GAP SERPL CALCULATED.3IONS-SCNC: 8 MMOL/L (ref 3–16)
BUN BLDV-MCNC: 12 MG/DL (ref 7–20)
CALCIUM SERPL-MCNC: 9 MG/DL (ref 8.3–10.6)
CHLORIDE BLD-SCNC: 100 MMOL/L (ref 99–110)
CO2: 29 MMOL/L (ref 21–32)
CREAT SERPL-MCNC: 0.8 MG/DL (ref 0.6–1.1)
GFR AFRICAN AMERICAN: >60
GFR NON-AFRICAN AMERICAN: >60
GLUCOSE BLD-MCNC: 88 MG/DL (ref 70–99)
POTASSIUM SERPL-SCNC: 4.2 MMOL/L (ref 3.5–5.1)
SODIUM BLD-SCNC: 137 MMOL/L (ref 136–145)

## 2020-05-20 PROCEDURE — 80048 BASIC METABOLIC PNL TOTAL CA: CPT

## 2020-05-20 PROCEDURE — 36415 COLL VENOUS BLD VENIPUNCTURE: CPT

## 2020-06-09 RX ORDER — CARVEDILOL 6.25 MG/1
6.25 TABLET ORAL 2 TIMES DAILY WITH MEALS
Qty: 60 TABLET | Refills: 5 | Status: SHIPPED | OUTPATIENT
Start: 2020-06-09 | End: 2021-01-28 | Stop reason: SDUPTHER

## 2020-06-23 RX ORDER — ATORVASTATIN CALCIUM 80 MG/1
80 TABLET, FILM COATED ORAL NIGHTLY
Qty: 30 TABLET | Refills: 11 | Status: SHIPPED | OUTPATIENT
Start: 2020-06-23 | End: 2021-07-27 | Stop reason: SDUPTHER

## 2020-07-09 RX ORDER — POTASSIUM CHLORIDE 20 MEQ/1
TABLET, EXTENDED RELEASE ORAL
Qty: 30 TABLET | Refills: 11 | Status: SHIPPED | OUTPATIENT
Start: 2020-07-09 | End: 2021-07-27 | Stop reason: SDUPTHER

## 2020-07-15 ENCOUNTER — OFFICE VISIT (OUTPATIENT)
Dept: CARDIOLOGY CLINIC | Age: 55
End: 2020-07-15
Payer: COMMERCIAL

## 2020-07-15 ENCOUNTER — HOSPITAL ENCOUNTER (OUTPATIENT)
Age: 55
Discharge: HOME | End: 2020-07-15
Payer: COMMERCIAL

## 2020-07-15 ENCOUNTER — TELEPHONE (OUTPATIENT)
Dept: CARDIOLOGY CLINIC | Age: 55
End: 2020-07-15

## 2020-07-15 VITALS
HEART RATE: 62 BPM | SYSTOLIC BLOOD PRESSURE: 112 MMHG | OXYGEN SATURATION: 97 % | HEIGHT: 64 IN | DIASTOLIC BLOOD PRESSURE: 70 MMHG | BODY MASS INDEX: 25.61 KG/M2 | WEIGHT: 150 LBS | TEMPERATURE: 98.4 F

## 2020-07-15 LAB
ANION GAP SERPL CALCULATED.3IONS-SCNC: 6 MMOL/L (ref 3–16)
BASOPHILS ABSOLUTE: 0.1 K/UL (ref 0–0.2)
BASOPHILS RELATIVE PERCENT: 1.3 %
BUN BLDV-MCNC: 13 MG/DL (ref 7–20)
CALCIUM SERPL-MCNC: 9.2 MG/DL (ref 8.3–10.6)
CHLORIDE BLD-SCNC: 103 MMOL/L (ref 99–110)
CO2: 29 MMOL/L (ref 21–32)
CREAT SERPL-MCNC: 0.7 MG/DL (ref 0.6–1.1)
EOSINOPHILS ABSOLUTE: 0.4 K/UL (ref 0–0.6)
EOSINOPHILS RELATIVE PERCENT: 4.8 %
GFR AFRICAN AMERICAN: >60
GFR NON-AFRICAN AMERICAN: >60
GLUCOSE BLD-MCNC: 93 MG/DL (ref 70–99)
HCT VFR BLD CALC: 40.2 % (ref 36–48)
HEMOGLOBIN: 13.4 G/DL (ref 12–16)
LYMPHOCYTES ABSOLUTE: 2.9 K/UL (ref 1–5.1)
LYMPHOCYTES RELATIVE PERCENT: 34.8 %
MCH RBC QN AUTO: 30.3 PG (ref 26–34)
MCHC RBC AUTO-ENTMCNC: 33.2 G/DL (ref 31–36)
MCV RBC AUTO: 91.3 FL (ref 80–100)
MONOCYTES ABSOLUTE: 0.5 K/UL (ref 0–1.3)
MONOCYTES RELATIVE PERCENT: 5.9 %
NEUTROPHILS ABSOLUTE: 4.4 K/UL (ref 1.7–7.7)
NEUTROPHILS RELATIVE PERCENT: 53.2 %
PDW BLD-RTO: 13.8 % (ref 12.4–15.4)
PLATELET # BLD: 214 K/UL (ref 135–450)
PMV BLD AUTO: 11.1 FL (ref 5–10.5)
POTASSIUM SERPL-SCNC: 4.3 MMOL/L (ref 3.5–5.1)
PRO-BNP: 123 PG/ML (ref 0–124)
RBC # BLD: 4.4 M/UL (ref 4–5.2)
SODIUM BLD-SCNC: 138 MMOL/L (ref 136–145)
TROPONIN: <0.01 NG/ML
WBC # BLD: 8.2 K/UL (ref 4–11)

## 2020-07-15 PROCEDURE — G8427 DOCREV CUR MEDS BY ELIG CLIN: HCPCS | Performed by: NURSE PRACTITIONER

## 2020-07-15 PROCEDURE — 83880 ASSAY OF NATRIURETIC PEPTIDE: CPT

## 2020-07-15 PROCEDURE — 84484 ASSAY OF TROPONIN QUANT: CPT

## 2020-07-15 PROCEDURE — 85025 COMPLETE CBC W/AUTO DIFF WBC: CPT

## 2020-07-15 PROCEDURE — 80048 BASIC METABOLIC PNL TOTAL CA: CPT

## 2020-07-15 PROCEDURE — 36415 COLL VENOUS BLD VENIPUNCTURE: CPT

## 2020-07-15 PROCEDURE — 93000 ELECTROCARDIOGRAM COMPLETE: CPT | Performed by: NURSE PRACTITIONER

## 2020-07-15 PROCEDURE — 3017F COLORECTAL CA SCREEN DOC REV: CPT | Performed by: NURSE PRACTITIONER

## 2020-07-15 PROCEDURE — G8417 CALC BMI ABV UP PARAM F/U: HCPCS | Performed by: NURSE PRACTITIONER

## 2020-07-15 PROCEDURE — 99214 OFFICE O/P EST MOD 30 MIN: CPT | Performed by: NURSE PRACTITIONER

## 2020-07-15 PROCEDURE — 4004F PT TOBACCO SCREEN RCVD TLK: CPT | Performed by: NURSE PRACTITIONER

## 2020-07-15 ASSESSMENT — ENCOUNTER SYMPTOMS
ANAL BLEEDING: 1
SHORTNESS OF BREATH: 1
COUGH: 1
CHEST TIGHTNESS: 0

## 2020-07-15 NOTE — PROGRESS NOTES
[facility]   Cardiology Note              Date:  July 15, 2020  Patientname: Jeff James  YOB: 1965  Chief Complaint   Patient presents with    Shortness of Breath        Primary Care physician: Marnie Severin, APRN - CNP    HISTORY OF PRESENT ILLNESS: Jeff James is a 63 y.o. female with PMH of HTN, HLD, DM, ETOH abuse presented to hospital with shortness of breath, cough, elevated troponin, and abnormal EKG. She was transferred to [facility] and underwent a LHC on 7/13 that showed non-obstructive CAD, LVEF 30%, echo showed EF 35% with apical akinesis, consider Takotsubo cardiomyopathy, left ventricular hypertrophy, grade 1 DD with normal left ventricular filling pressure. Prior to coming into the hospital, she had edema for months. She used to drink ETOH heavily for 9 years, from [age] years of age. She would drink up to a case of beer a day. She was started on ASA, Lipitor, Coreg, Lisinopril, Lasix, and aldactone. In May she was placed on inhalers for COPD and PFT was ordered- she has not got PFT yet due to [place]. She presents for complaints of shortness of breath and increased fatigue. She stated that she feels like she did when her EF was down last year. She is very tearful and dealing with a lot of stress at home. She stated last week she was working and noted some nausea that come on rapidly. She sated she went to sit down and it resolved. She stated that her shortness of breath has increased with exertion- she continues to smoke about 1/2 PPD and not interested in quitting at this time. She stated that she did notice some improvement in her shortness of breath with inhalers were added to her regimen. Weights at home have been 137-140. She stated she has been having pain under left breast- she describes it as an ache it comes and goes. She stated she gets these pains at rest and with exertion- these occur more when she is agitated.  This chest pain doesn't happen meals) 6/9/20  Yes XIAO Salazar CNP   fluticasone (FLOVENT HFA) 220 MCG/ACT inhaler Inhale 2 puffs into the lungs 2 times daily 5/13/20  Yes XIAO Card CNP   thiamine 100 MG tablet Take 1 tablet by mouth daily 5/13/20  Yes XIAO Suarez - CNP   omeprazole (PRILOSEC) 20 MG delayed release capsule Take 20 mg by mouth daily   Yes Historical Provider, MD   MULTIPLE VITAMIN PO Take by mouth   Yes Historical Provider, MD   lisinopril (PRINIVIL;ZESTRIL) 10 MG tablet Take 1 tablet by mouth 2 times daily 5/4/20  Yes Denisse Ziegler MD   furosemide (LASIX) 20 MG tablet Take 1 tablet by mouth See Admin Instructions Daily and as needed if weight gain, increased shortness of breath, increased swelling. 12/19/19  Yes XIAO Salazar CNP   aspirin 325 MG EC tablet Take 1 tablet by mouth daily 7/17/19  Yes XIAO Marrufo CNP   acetaminophen (TYLENOL) 500 MG tablet Take 500 mg by mouth every 6 hours as needed for Pain   Yes Historical Provider, MD   albuterol (PROVENTIL HFA;VENTOLIN HFA) 108 (90 BASE) MCG/ACT inhaler Use 2 puffs 4 times daily for 7 days then as needed for wheezing. Dispense with Spacer and instruct in use. At patient's preference may use 60 dose MDI. May Sub Pro-Air or Proventil as needed per insurance. 11/25/15  Yes Alessandro Vitale PA-C   FLUoxetine (PROZAC) 20 MG capsule Take 20 mg by mouth daily    Historical Provider, MD       Allergies:  Aldactone [spironolactone] and Morphine    Social History:   reports that she has been smoking cigarettes. She has a 20.00 pack-year smoking history. She has never used smokeless tobacco. She reports previous alcohol use. She reports previous drug use. Frequency: 1.00 time per week. Drug: Marijuana. Family History: family history includes Cancer in her father; Heart Disease in her mother; High Blood Pressure in her father and mother. Review of Systems   Review of Systems   Constitutional: Positive for fatigue.  Negative for activity change. Respiratory: Positive for cough and shortness of breath. Negative for chest tightness. Cardiovascular: Negative for chest pain, palpitations and leg swelling. Gastrointestinal: Positive for anal bleeding (hemorrhoids ). Musculoskeletal: Negative for myalgias. Neurological: Negative for dizziness, syncope, weakness, light-headedness and headaches. Psychiatric/Behavioral: Positive for sleep disturbance. The patient is nervous/anxious. Tearful       OBJECTIVE:    Vital signs:    /70   Pulse 62   Temp 98.4 °F (36.9 °C)   Ht 5' 4\" (1.626 m)   Wt 150 lb (68 kg)   LMP 05/29/2013   SpO2 97%   BMI 25.75 kg/m²      Physical Exam:  Constitutional:  Comfortable and alert, NAD, appears older than stated age  Eyes: PERRL, sclera nonicteric  Neck:  Supple, no masses, no thyroidmegaly, no JVD  Skin:  Warm and dry; no rash or lesions  Heart:  Regular, normal apex, S1 and S2 normal, no M/G/R  Lungs:  Normal respiratory effort; diminished with scattered expiratory wheezes; no rhonchi/rales  Abdomen: soft, non tender, + bowel sounds  Extremities:  No edema or cyanosis; no clubbing  Neuro: alert and oriented, moves legs and arms equally, tearful mood, anxious at times       Data Reviewed:    Echo 10/23/2019:  Left Ventricle  Normal left ventricular systolic function with ejection fraction of 55-60%. No regional wall motion abnormalites are seen. Compared to previous study from 9- no changes noted in left  ventricular function. Echo 9/17/2019:  Limited Echo for EF  Left ventricular systolic function is normal with ejection fraction  estimated at 55-60 %. Normal left ventricular wall thickness. No regional wall motion abnormalities are noted. Previous echo done 7/15/2019 - EF 35%    Echo 7/15/2019: The left ventricular systolic function is moderately reduced with an  ejection fraction of 35 %. Apical akinesis. Consider Takotsubo cardiomyopathy.   There is mild concentric left ventricular hypertrophy. Grade I diastolic dysfunction with normal left ventricular filling pressure. Mild mitral regurgitation. Coronary angiogram 7/13/2019:  Cath 7/13/2019  LM <20%  LAD 30%  Cx <20%  RCA 30%  LVEF 30%       Cardiology Labs Reviewed:   Lab Data:  Most recent lab results below reviewed in office    CBC:   Lab Results   Component Value Date    WBC 7.2 10/23/2019    WBC 9.3 10/22/2019    WBC 22.2 07/14/2019    RBC 4.20 10/23/2019    RBC 4.58 10/22/2019    RBC 5.18 07/14/2019    HGB 12.6 10/23/2019    HGB 14.1 10/22/2019    HGB 15.5 07/14/2019    HCT 37.8 10/23/2019    HCT 40.7 10/22/2019    HCT 47.3 07/14/2019    MCV 89.8 10/23/2019    MCV 88.8 10/22/2019    MCV 91.3 07/14/2019    RDW 14.8 10/23/2019    RDW 14.7 10/22/2019    RDW 14.1 07/14/2019     10/23/2019     10/22/2019     07/14/2019     BMP:  Lab Results   Component Value Date     05/20/2020     12/18/2019     10/23/2019    K 4.2 05/20/2020    K 3.2 12/18/2019    K 3.1 10/23/2019    K 2.3 10/22/2019     05/20/2020    CL 97 12/18/2019     10/23/2019    CO2 29 05/20/2020    CO2 30 12/18/2019    CO2 31 10/23/2019    PHOS 2.4 07/13/2019    BUN 12 05/20/2020    BUN 15 12/18/2019    BUN 14 10/23/2019    CREATININE 0.8 05/20/2020    CREATININE 1.0 12/18/2019    CREATININE 0.7 10/23/2019     BNP:   Lab Results   Component Value Date    PROBNP 57 12/18/2019    PROBNP 76 10/22/2019    PROBNP 26,550 07/15/2019     FASTING LIPID PANEL:  Lab Results   Component Value Date    HDL 31 10/23/2019    HDL 34 07/24/2010    LDLCALC 60 10/23/2019    TRIG 102 10/23/2019     LIVER PROFILE:No results for input(s): AST, ALT, ALB in the last 72 hours. Reviewed all labs and imaging today      Assessment:   1. CAD: stable - atypical chest pain- been ongoing- noted to be happening more with the increase in stress with home situation   -mild non obstructive per angiogram 7/13/19  2.  Nonischemic cardiomyopathy, likely stress- induced vs ETOH EF 35% recovered EF 55-60% 10/23/2019  3. Hx of sCHF/dCHF: appaers compensated, no JVD,no edema, lungs with expiratory wheezes  4. Shortness of breath: multifactorial - continues to smoke and COPD  5. HTN:controlled  6. HLD:controlled LDL 60   7. Tobacco abuse: no ready to quit   8. Hx of ETOH abuse  9. Hemorrhoids: bleeding at home she is to call and schedule appt with GI for colonoscopy per PCP     Plan:   1. EKG today- reviewed with Dr. Sven Orosco, no significant changes  2. Blood work today to check kidney function, heart failure number, as well as blood count  3. Follow up with  dental as previously referred  4. Monitor blood pressure daily  5. Schedule limited echo to look at the pumping of the heart. (EF) - call  [phone] ([id number])  6. No change in medication at this time  7. Schedule follow up with Dr. Storm Givens in 6 months, or sooner with me pending lab results and echocardiogram  8. Follow up with PCP tomorrow as schedule- PFT testing   9. Diet and exercise discussed   10. Smoking cessation  11. Will discuss with Dr. Storm Givens regarding Aspirin 325 mg vs 81 mg daily       XIAO Palumbo-CNP  [employer]  [phone]       QUALITY MEASURES  1. Tobacco Cessation Counseling: Yes  2. Retake of BP if >140/90:   NA  3. Documentation to PCP/referring for new patient:  Sent to PCP at close of office visit  4. CAD patient on anti-platelet: Yes  5. CAD patient on STATIN therapy:  Yes  6.  Patient with CHF and aFib on anticoagulation:  NA

## 2020-07-15 NOTE — TELEPHONE ENCOUNTER
Pt would like a return call from Glenn Jane regarding if she can return to work on Friday or not?   Please call

## 2020-07-15 NOTE — PATIENT INSTRUCTIONS
Plan:   1. EKG today- reviewed with Dr. Lea Moses, no significant changes  2. Blood work today to check kidney function, heart failure number, as well as blood count  3. Follow up with  dental as previously referred  4. Monitor blood pressure daily  5. Schedule limited echo to look at the pumping of the heart. (EF) - call  [phone] ([id number])  6. No change in medication at this time  7. Schedule follow up with Dr. Aminta Martin in 6 months, or sooner with me pending lab results and echocardiogram  8. Follow up with PCP tomorrow as schedule- PFT testing   9. Diet and exercise discussed   10. Smoking cessation  11.  Will discuss with Dr. Aminta Martin regarding Aspirin 325 mg vs 81 mg daily

## 2020-07-16 ENCOUNTER — TELEMEDICINE (OUTPATIENT)
Dept: FAMILY MEDICINE CLINIC | Age: 55
End: 2020-07-16
Payer: COMMERCIAL

## 2020-07-16 PROCEDURE — G8427 DOCREV CUR MEDS BY ELIG CLIN: HCPCS | Performed by: NURSE PRACTITIONER

## 2020-07-16 PROCEDURE — 99213 OFFICE O/P EST LOW 20 MIN: CPT | Performed by: NURSE PRACTITIONER

## 2020-07-16 PROCEDURE — 3017F COLORECTAL CA SCREEN DOC REV: CPT | Performed by: NURSE PRACTITIONER

## 2020-07-16 RX ORDER — ASPIRIN 81 MG/1
81 TABLET ORAL DAILY
Qty: 30 TABLET | Refills: 5 | Status: SHIPPED | OUTPATIENT
Start: 2020-07-16 | End: 2020-07-16

## 2020-07-16 ASSESSMENT — ENCOUNTER SYMPTOMS
NAUSEA: 1
CHANGE IN BOWEL HABIT: 0
VISUAL CHANGE: 0
VOMITING: 0
RHINORRHEA: 0
SHORTNESS OF BREATH: 1
ORTHOPNEA: 0
COUGH: 1
ABDOMINAL PAIN: 0
WHEEZING: 0
SWOLLEN GLANDS: 0
SORE THROAT: 0
HEMOPTYSIS: 0

## 2020-07-16 NOTE — PROGRESS NOTES
2020    TELEHEALTH EVALUATION -- Audio/Visual (During FZBAT-56 public health emergency)    Chief Complaint   Patient presents with    Fatigue    Shortness of Breath    Other     pt has been going up and down she think she is carry a lot more water        HPI:  Wallace Madrid (:  1965) has requested an audio/video evaluation for the following concern(s):    Fatigue   This is a new problem. The current episode started 1 to 4 weeks ago. The problem occurs daily. Associated symptoms include chest pain (aching in chest and went to cardio yesterday and ekg and labs normal), congestion (few weeks), coughing, diaphoresis (not new), fatigue, myalgias and nausea. Pertinent negatives include no abdominal pain, anorexia, arthralgias, change in bowel habit, chills, fever, headaches, joint swelling, neck pain, numbness, rash, sore throat, swollen glands, urinary symptoms, vertigo, visual change, vomiting or weakness. The symptoms are aggravated by exertion. She has tried drinking for the symptoms. The treatment provided no relief. Shortness of Breath   This is a new problem. The current episode started 1 to 4 weeks ago (2 weeks ago). The problem occurs daily. Associated symptoms include chest pain (aching in chest and went to cardio yesterday and ekg and labs normal). Pertinent negatives include no abdominal pain, claudication, coryza, ear pain, fever, headaches, hemoptysis, leg pain, leg swelling, neck pain, orthopnea, PND, rash, rhinorrhea, sore throat, swollen glands, syncope, vomiting or wheezing. Nothing aggravates the symptoms. She has tried nothing for the symptoms. The treatment provided no relief. Other   Associated symptoms include chest pain (aching in chest and went to cardio yesterday and ekg and labs normal), congestion (few weeks), coughing, diaphoresis (not new), fatigue, myalgias and nausea.  Pertinent negatives include no abdominal pain, anorexia, arthralgias, change in bowel habit, chills, fever, headaches, joint swelling, neck pain, numbness, rash, sore throat, swollen glands, urinary symptoms, vertigo, visual change, vomiting or weakness. Patient saw cardio yesterday and states that her symptoms are noncardiac related. She had a normal BNP and troponin EKG  Cardiology wants pft done and has echo scheduled for 8/7/20    Review of Systems   Constitutional: Positive for diaphoresis (not new) and fatigue. Negative for appetite change, chills, fever and unexpected weight change. HENT: Positive for congestion (few weeks). Negative for ear pain, rhinorrhea and sore throat. Eyes: Negative. Respiratory: Positive for cough and shortness of breath. Negative for hemoptysis and wheezing. Cardiovascular: Positive for chest pain (aching in chest and went to cardio yesterday and ekg and labs normal). Negative for palpitations, orthopnea, claudication, leg swelling, syncope and PND. Gastrointestinal: Positive for nausea. Negative for abdominal pain, anal bleeding, anorexia, blood in stool, change in bowel habit and vomiting. Endocrine: Negative. Genitourinary: Negative. Negative for hematuria. Musculoskeletal: Positive for myalgias. Negative for arthralgias, joint swelling and neck pain. Skin: Negative. Negative for rash. Allergic/Immunologic: Negative. Neurological: Negative. Negative for dizziness, vertigo, syncope, weakness, light-headedness, numbness and headaches. Hematological: Negative. Does not bruise/bleed easily. Psychiatric/Behavioral: Negative. All other systems reviewed and are negative. Prior to Visit Medications    Medication Sig Taking?  Authorizing Provider   potassium chloride (KLOR-CON M) 20 MEQ extended release tablet Take 1 tablet by mouth daily Yes Carloz Orellana MD   atorvastatin (LIPITOR) 80 MG tablet Take 1 tablet by mouth nightly Yes Carloz Orellana MD   carvedilol (COREG) 6.25 MG tablet Take 1 tablet by mouth 2 times daily (with meals) Yes XIAO Staley - CNP   fluticasone (FLOVENT HFA) 220 MCG/ACT inhaler Inhale 2 puffs into the lungs 2 times daily Yes XIAO Molina CNP   thiamine 100 MG tablet Take 1 tablet by mouth daily Yes XIAO Burns - CNP   omeprazole (PRILOSEC) 20 MG delayed release capsule Take 20 mg by mouth daily Yes Historical Provider, MD   MULTIPLE VITAMIN PO Take by mouth Yes Historical Provider, MD   lisinopril (PRINIVIL;ZESTRIL) 10 MG tablet Take 1 tablet by mouth 2 times daily Yes Carly Randall MD   furosemide (LASIX) 20 MG tablet Take 1 tablet by mouth See Admin Instructions Daily and as needed if weight gain, increased shortness of breath, increased swelling. Yes XIAO Staley - CNP   acetaminophen (TYLENOL) 500 MG tablet Take 500 mg by mouth every 6 hours as needed for Pain Yes Historical Provider, MD   FLUoxetine (PROZAC) 20 MG capsule Take 20 mg by mouth daily Yes Historical Provider, MD   albuterol (PROVENTIL HFA;VENTOLIN HFA) 108 (90 BASE) MCG/ACT inhaler Use 2 puffs 4 times daily for 7 days then as needed for wheezing. Dispense with Spacer and instruct in use. At patient's preference may use 60 dose MDI. May Sub Pro-Air or Proventil as needed per insurance.  Yes Chely Cruz PA-C       Social History     Tobacco Use    Smoking status: Current Every Day Smoker     Packs/day: 0.50     Years: 40.00     Pack years: 20.00     Types: Cigarettes    Smokeless tobacco: Never Used    Tobacco comment: 6-10 cig daily   Substance Use Topics    Alcohol use: Not Currently     Comment: 6 X per year    Drug use: Not Currently     Frequency: 1.0 times per week     Types: Marijuana     Comment: none since 2014        Allergies   Allergen Reactions    Aldactone [Spironolactone]      Hives      Morphine Nausea And Vomiting   ,   Past Medical History:   Diagnosis Date    Anxiety     Arthritis     Asthma     CAD (coronary artery disease)     Cancer ([facility] [address]     skin \"precancerous\"    CHF (congestive heart failure) (HCC)     COPD (chronic obstructive pulmonary disease) (HCC)     Cystitis, interstitial     Depression     Diabetes mellitus (HCC)     borderline    DUB (dysfunctional uterine bleeding) 3/2013    GERD (gastroesophageal reflux disease)     Hypercholesterolemia     no meds currently    Hypertension     Kidney stones     Low blood potassium     PONV (postoperative nausea and vomiting)     Sleep apnea     never fitted for an appliance   ,   Past Surgical History:   Procedure Laterality Date    APPENDECTOMY      BLADDER SURGERY  2010    X 3     SECTION      COLONOSCOPY  2017    Polyps    DIAGNOSTIC CARDIAC CATH LAB PROCEDURE      ECTOPIC PREGNANCY SURGERY      HYSTERECTOMY N/A 13    ROBOTIC ASSISTED TOTAL LAPAROSCOPIC HYSTERECTOMY    KNEE ARTHROSCOPY Right 2017    LITHOTRIPSY      X 3    SKIN BIOPSY     ,   Social History     Tobacco Use    Smoking status: Current Every Day Smoker     Packs/day: 0.50     Years: 40.00     Pack years: 20.00     Types: Cigarettes    Smokeless tobacco: Never Used    Tobacco comment: 6-10 cig daily   Substance Use Topics    Alcohol use: Not Currently     Comment: 6 X per year    Drug use: Not Currently     Frequency: 1.0 times per week     Types: Marijuana     Comment: none since    ,   Family History   Problem Relation Age of Onset    High Blood Pressure Mother     Heart Disease Mother     Cancer Father         lung    High Blood Pressure Father    ,   Immunization History   Administered Date(s) Administered    Influenza, Quadv, IM, PF (6 mo and older Fluzone, Flulaval, Fluarix, and 3 yrs and older Afluria) 10/23/2019    Pneumococcal Conjugate 7-valent (Gail Degree) 2011   ,   Health Maintenance   Topic Date Due    Hepatitis C screen  1965    HIV screen  1979    Cervical cancer screen  1985    Breast cancer screen  2014    Shingles Vaccine (1 of 2) 2014    Colon cancer screen colonoscopy  02/01/2014    A1C test (Diabetic or Prediabetic)  07/13/2020    Flu vaccine (1) 09/01/2020    Lipid screen  10/23/2020    Potassium monitoring  07/15/2021    Creatinine monitoring  07/15/2021    DTaP/Tdap/Td vaccine (2 - Td) 07/25/2026    Pneumococcal 0-64 years Vaccine  Completed    Hepatitis A vaccine  Aged Out    Hepatitis B vaccine  Aged Out    Hib vaccine  Aged Out    Meningococcal (ACWY) vaccine  Aged Out       PHYSICAL EXAMINATION:  [ INSTRUCTIONS:  \"[x]\" Indicates a positive item  \"[]\" Indicates a negative item  -- DELETE ALL ITEMS NOT EXAMINED]  Vital Signs: (As obtained by patient/caregiver or practitioner observation)    Blood pressure-  Heart rate-    Respiratory rate-    Temperature-  Pulse oximetry-     Constitutional: [x] Appears well-developed and well-nourished [x] No apparent distress      [] Abnormal-   Mental status  [x] Alert and awake  [x] Oriented to person/place/time [x]Able to follow commands      Eyes:  EOM    [x]  Normal  [] Abnormal-  Sclera  [x]  Normal  [] Abnormal -         Discharge [x]  None visible  [] Abnormal -    HENT:   [x] Normocephalic, atraumatic.   [] Abnormal   [x] Mouth/Throat: Mucous membranes are moist.     External Ears [x] Normal  [] Abnormal-     Neck: [x] No visualized mass     Pulmonary/Chest: [x] Respiratory effort normal.  [x] No visualized signs of difficulty breathing or respiratory distress        [] Abnormal-      Musculoskeletal:   [x] Normal gait with no signs of ataxia         [x] Normal range of motion of neck        [] Abnormal-       Neurological:        [x] No Facial Asymmetry (Cranial nerve 7 motor function) (limited exam to video visit)          [x] No gaze palsy        [] Abnormal-         Skin:        [x] No significant exanthematous lesions or discoloration noted on facial skin         [] Abnormal-            Psychiatric:       [x] Normal Affect [x] No Hallucinations        [] Abnormal-     Other pertinent observable physical exam findings-     ASSESSMENT/PLAN:  1. Anemia, unspecified type  Apparently she saw cardiology yesterday and told she had some slight anemia. Check the following  - Iron and TIBC; Future  - Ferritin; Future  - Vitamin B12 & Folate; Future    2. Dyspnea, unspecified type  Patient had cardiac follow-up with a normal EKG, troponin and BNP and recommends PFT and echo which is scheduled in the next couple of weeks    3. Fatigue, unspecified type  Discussed the need for potential COVID-19 testing due to symptoms of 2 weeks duration  Patient has been given information    Return if symptoms worsen or fail to improve. Alonso Jara is a 63 y.o. female being evaluated by a Virtual Visit (video visit) encounter to address concerns as mentioned above. A caregiver was present when appropriate. Due to this being a TeleHealth encounter (During [place] [address] public health emergency), evaluation of the following organ systems was limited: Vitals/Constitutional/EENT/Resp/CV/GI//MS/Neuro/Skin/Heme-Lymph-Imm. Pursuant to the emergency declaration under the [address] [place] [address] and the [employer] Act, this Virtual Visit was conducted with patient's (and/or legal guardian's) consent, to reduce the patient's risk of exposure to COVID-19 and provide necessary medical care. The patient (and/or legal guardian) has also been advised to contact this office for worsening conditions or problems, and seek emergency medical treatment and/or call 911 if deemed necessary. Patient identification was verified at the start of the visit: Yes    Total time spent on this encounter: 20 minutes    Services were provided through a video synchronous discussion virtually to substitute for in-person clinic visit. Patient and provider were located at their individual homes.     --XIAO Rudolph - CNP on 7/16/2020 at 3:53 PM    An electronic signature was used to authenticate this note.

## 2020-07-16 NOTE — PROGRESS NOTES
Juan Manuel Mcguire is a 63 y.o. female evaluated via telephone on 7/16/2020.       Consent:  She and/or health care decision maker is aware that that she may receive a bill for this telephone service, depending on her insurance coverage, and has provided verbal consent to proceed: Yes      Aura Castro

## 2020-07-17 ASSESSMENT — ENCOUNTER SYMPTOMS
ANAL BLEEDING: 0
ALLERGIC/IMMUNOLOGIC NEGATIVE: 1
EYES NEGATIVE: 1
BLOOD IN STOOL: 0

## 2020-07-18 ENCOUNTER — TELEPHONE (OUTPATIENT)
Dept: FAMILY MEDICINE CLINIC | Age: 55
End: 2020-07-18

## 2020-07-18 RX ORDER — DOXYCYCLINE HYCLATE 100 MG
100 TABLET ORAL 2 TIMES DAILY
Qty: 14 TABLET | Refills: 0 | Status: SHIPPED | OUTPATIENT
Start: 2020-07-18 | End: 2020-07-25

## 2020-07-20 ENCOUNTER — TELEPHONE (OUTPATIENT)
Dept: FAMILY MEDICINE CLINIC | Age: 55
End: 2020-07-20

## 2020-07-20 RX ORDER — CLOTRIMAZOLE 10 MG/1
10 LOZENGE ORAL; TOPICAL
Qty: 50 TABLET | Refills: 0 | Status: SHIPPED | OUTPATIENT
Start: 2020-07-20 | End: 2020-07-30

## 2020-07-20 NOTE — TELEPHONE ENCOUNTER
Donavon Joiner called this on-call provider on 07/18 stating her [facility] Page test came back negative and she did some googling and some of the symptoms of Corona can mimic Lyme disease. She is asking if she may have Lyme disease. She never had a bull's eye rash. She admits to having tick bites before in the past, but no ticks that were engorged or attached to her for more than a few hours in the past.  She also admits to having infected teeth. She states she has jaw pain. She is trying to get in to see a dentist.  She is asking for an antibiotic to be sent in for her for her teeth and possible lyme disease. Doxycycline Rx'd. Informed her that for Lyme disease to be present, she would need to have a tick on her for 72 to 96 hours and the tick generally is engorged. She verbalizes understanding and states she never had a tick on her like this.

## 2020-07-20 NOTE — TELEPHONE ENCOUNTER
Pt states that she was on an antibiotic for a broken tooth and she now has thrush. Was wanting to see if she could get something called in to [facility].

## 2020-08-20 RX ORDER — THIAMINE MONONITRATE (VIT B1) 100 MG
TABLET ORAL
Qty: 30 TABLET | Refills: 0 | Status: SHIPPED | OUTPATIENT
Start: 2020-08-20 | End: 2020-09-22

## 2020-09-04 ENCOUNTER — NURSE TRIAGE (OUTPATIENT)
Dept: OTHER | Facility: CLINIC | Age: 55
End: 2020-09-04

## 2020-09-04 NOTE — TELEPHONE ENCOUNTER
Reason for Disposition   Face is swollen    Answer Assessment - Initial Assessment Questions  1. LOCATION: \"Which tooth is hurting? \"  (e.g., right-side/left-side, upper/lower, front/back)      Left side one on top and one on bottom  2. ONSET: \"When did the toothache start? \"  (e.g., hours, days)       8/28  3. SEVERITY: \"How bad is the toothache? \"  (Scale 1-10; mild, moderate or severe)    - MILD (1-3): doesn't interfere with chewing     - MODERATE (4-7): interferes with chewing, interferes with normal activities, awakens from sleep      - SEVERE (8-10): unable to eat, unable to do any normal activities, excruciating pain         8/10  4. SWELLING: \"Is there any visible swelling of your face? \"      Yes   5. OTHER SYMPTOMS: \"Do you have any other symptoms? \" (e.g., fever)      denies  6. PREGNANCY: \"Is there any chance you are pregnant? \" \"When was your last menstrual period? \"      No    Protocols used: TOOTHACHE-ADULT-OH    Caller provided care advice and instructed to call back with worsening symptoms. Spoke with [facility] to schedule appointment but none available. Referred to to urgent care.

## 2020-09-16 ENCOUNTER — OFFICE VISIT (OUTPATIENT)
Dept: ORTHOPEDIC SURGERY | Age: 55
End: 2020-09-16
Payer: COMMERCIAL

## 2020-09-16 VITALS — HEIGHT: 64 IN | BODY MASS INDEX: 25.61 KG/M2 | WEIGHT: 150 LBS

## 2020-09-16 PROCEDURE — G8417 CALC BMI ABV UP PARAM F/U: HCPCS | Performed by: ORTHOPAEDIC SURGERY

## 2020-09-16 PROCEDURE — 4004F PT TOBACCO SCREEN RCVD TLK: CPT | Performed by: ORTHOPAEDIC SURGERY

## 2020-09-16 PROCEDURE — 99213 OFFICE O/P EST LOW 20 MIN: CPT | Performed by: ORTHOPAEDIC SURGERY

## 2020-09-16 PROCEDURE — G8427 DOCREV CUR MEDS BY ELIG CLIN: HCPCS | Performed by: ORTHOPAEDIC SURGERY

## 2020-09-16 NOTE — PROGRESS NOTES
Jacquline Crafts, MD Verner Memos, PA-C         Orthopaedic Surgery and Sports Medicine    Chief Complaint:  Knee Pain (RIGHT: S/P right knee arthroscopy with partial medial meniscectomy Sx 17)      History of Present Illness:  Ana Charles is a 63 y.o. female here regarding right knee pain. The pain is located medial.   Patient feels this discomfort may be related to a known injury: No    The patient describes the symptoms as aching and sharp. Symptoms improve with rest, avoiding painful activities. The symptoms are worse with activity, weight bearing. Discomfort has been progressive over time. Sleeping habits related to chief complaint:fair    Patient does have a history of a partial meniscectomy performed by Dr. Gilberto Lewis in 2017. She was doing fairly well but began having more pain. She was preapproved for Durolane viscosupplementation injections however with the COVID-19 health pandemic she was unable to come in and get those done. That approval has  however she is still interested in that injection. She continues to have difficulty with her range of motion and does complain of swelling. Her pain is located mainly on the medial aspect. She was previously taking ibuprofen with minimal relief, but has had to stop that due to her recent diagnosis of congestive heart failure. She has tried cortisone injections in the past however she reports those last for only a few days. Has any new injuries. Outside reports reviewed: historical medical records. Medical History:  Patient's medications, allergies, past medical, surgical, social and family histories were reviewed and updated as appropriate.     Past Medical History:   Diagnosis Date    Anxiety     Arthritis     Asthma     CAD (coronary artery disease)     Cancer ([facility]     skin \"precancerous\"    [facility]     COPD (chronic obstructive pulmonary disease) (HCC)     Cystitis, interstitial     Depression     Diabetes mellitus (HCC)     borderline    DUB (dysfunctional uterine bleeding) 3/2013    GERD (gastroesophageal reflux disease)     Hypercholesterolemia     no meds currently    Hypertension     Kidney stones     Low blood potassium     PONV (postoperative nausea and vomiting)     Sleep apnea     never fitted for an appliance       Past Surgical History:   Procedure Laterality Date    APPENDECTOMY      BLADDER SURGERY  2010    X 3     SECTION      COLONOSCOPY  2017    Polyps    DIAGNOSTIC CARDIAC CATH LAB PROCEDURE      ECTOPIC PREGNANCY SURGERY      HYSTERECTOMY N/A 13    ROBOTIC ASSISTED TOTAL LAPAROSCOPIC HYSTERECTOMY    KNEE ARTHROSCOPY Right 2017    LITHOTRIPSY      X 3    SKIN BIOPSY         Family History   Problem Relation Age of Onset    High Blood Pressure Mother     Heart Disease Mother     Cancer Father         lung    High Blood Pressure Father        Social History     Socioeconomic History    Marital status:      Spouse name: None    Number of children: None    Years of education: None    Highest education level: None   Occupational History    None   Social Needs    Financial resource strain: None    Food insecurity     Worry: None     Inability: None    Transportation needs     Medical: None     Non-medical: None   Tobacco Use    Smoking status: Current Every Day Smoker     Packs/day: 0.50     Years: 40.00     Pack years: 20.00     Types: Cigarettes    Smokeless tobacco: Never Used    Tobacco comment: 6-10 cig daily   Substance and Sexual Activity    Alcohol use: Not Currently     Comment: 6 X per year    Drug use: Not Currently     Frequency: 1.0 times per week     Types: Marijuana     Comment: none since     Sexual activity: Yes     Partners: Male   Lifestyle    Physical activity     Days per week: None current facility-administered medications for this visit. Allergies   Allergen Reactions    Aldactone [Spironolactone]      Hives      Morphine Nausea And Vomiting          Review of Systems:   Pertinent items are noted in HPI  Review of systems reviewed from Patient History Form dated on 09/16/2020 and available in the patient's chart under the Media tab. Vital Signs: There were no vitals filed for this visit. Pain Assessment:  Pain Assessment  Location of Pain: Knee  Location Modifiers: Right  Severity of Pain: 7  Quality of Pain: Aching, Other (Comment)(sharp with bending)  Duration of Pain: Persistent  Frequency of Pain: Constant  Aggravating Factors: Bending, Straightening  Limiting Behavior: Some  Relieving Factors: Rest         General Exam:   Constitutional: Patient is adequately groomed with no evidence of malnutrition  Mental Status: The patient is oriented to time, place and person. The patient's mood and affect are appropriate. Vascular: Examination reveals no swelling or calf tenderness. Peripheral pulses are palpable and 2+. Neurological: The patient has good coordination. There is no weakness or sensory deficit. Right Knee Examination  Inspection:   Knee alignment: neutral  mild swelling noted. No erythema or ecchymosis. Skin is intact with no cellulitis, rashes, ulcerations, lymphedema or cutaneous lesions noted. Gait: Slight antalgic. The patient can bear weight on the extremity. Palpation: moderate tenderness to palpation on the lateral joint line and medial joint line. minimal effusion noted. Range of Motion:  Full, but with pain    Special Tests: Jimmy's test: not tested       Varus laxity at 30 degrees: negative       Valgus laxity at 30 degrees: negative    Strength: no gross motor weakness noted. Motor exam of the lower extremities show quadriceps, hamstrings, foot dorsiflexion and plantarflexion grossly intact.     Neurologic & vascular: Sensation to both feet is grossly intact to light touch. The bilateral lower extremities are warm and well-perfused with brisk capillary refill. Additional Examinations:  Left Lower Extremity: Examination of the left lower extremity does not show any tenderness, deformity or injury. Range of motion is within normal limits. There is no gross instability. There are no rashes, ulcerations or lesions. Strength and tone are normal.      DIAGNOSTICS  Xrays obtained in office today: No  Xrays reviewed today: Yes  4 views of the right knee show   Fracture:no   Dislocation: no  Patellofemoral arthritis: mild  Medial compartment: moderate  Lateral compartment: mild  Varus deformity: No  Valgus deformity: No      ASSESSMENT (Medical Decision Making)    Aaron Boxer is a 63 y.o. female with the following diagnosis:  right mild to moderate knee arthritis worse in medial compartment       ICD-10-CM    1. Arthritis of right knee  M17.11 DUROLANE INJECTION (For Auth/Precert)       Her overall course is worsening despite conservative treatment      PLAN (Medical Decision Making)  Office Procedures:  Orders Placed This Encounter   Procedures    [address] (For Auth/Precert)     Standing Status:   Future     Standing Expiration Date:   9/16/2021       Treatment Plan:    I discussed the diagnosis and treatment options with Aaron Boxer today. Today, would like to get the patient precertified for Visco supplementation, Durolane. She will follow-up once that gets preapproved and we can proceed with that injection. Non-steroidal anti-inflammatories medications (NSAIDs) can be used to assist with pain control and to reduce inflammatory changes. These medications may be over-the-counter or prescribed. We discussed taking the NSAID properly and the precautions. The patient understands that this medication may potentially interfere with other medications.   Patient was also instructed to immediately discontinue the medication is there is any possible complication. Yony Mcclure was instructed to call the office if her symptoms worsen or if new symptoms appear prior to the next scheduled visit. She is specifically instructed to contact the office between now and schedule appointment if she has concerns related to her condition or if she needs assistance in scheduling any above tests. She is welcome to call for an appointment sooner if she has any additional concerns or questions. Felciita Guzman PA-C, scribing for and in the presence of Dr. Kunal Mistry   9/16/2020 3:49 PM      I, Dr. Susan Ureña, personally performed the services described in this documentation as scribed by Stan Briceno. ABHIJEET Bonner in my presence, and it is both accurate and complete. Susan Ureña MD        This dictation was performed with a verbal recognition program [facility]) and it was checked for errors. It is possible that there are still dictated errors within this office note. If so, please bring any errors to my attention for an addendum. All efforts were made to ensure that this office note is accurate.

## 2020-09-17 ENCOUNTER — TELEPHONE (OUTPATIENT)
Dept: ORTHOPEDIC SURGERY | Age: 55
End: 2020-09-17

## 2020-09-22 RX ORDER — THIAMINE MONONITRATE (VIT B1) 100 MG
TABLET ORAL
Qty: 30 TABLET | Refills: 3 | Status: SHIPPED | OUTPATIENT
Start: 2020-09-22

## 2020-11-03 RX ORDER — FUROSEMIDE 20 MG/1
20 TABLET ORAL SEE ADMIN INSTRUCTIONS
Qty: 180 TABLET | Refills: 1 | Status: SHIPPED | OUTPATIENT
Start: 2020-11-03

## 2020-11-03 NOTE — TELEPHONE ENCOUNTER
5/04/2020 [facility]    [facility]    No upcoming appt

## 2020-11-18 ENCOUNTER — TELEPHONE (OUTPATIENT)
Dept: ORTHOPEDIC SURGERY | Age: 55
End: 2020-11-18

## 2020-12-28 ENCOUNTER — OFFICE VISIT (OUTPATIENT)
Dept: ORTHOPEDIC SURGERY | Age: 55
End: 2020-12-28
Payer: COMMERCIAL

## 2020-12-28 VITALS — HEIGHT: 64 IN | BODY MASS INDEX: 25.61 KG/M2 | WEIGHT: 150 LBS

## 2020-12-28 PROCEDURE — 4004F PT TOBACCO SCREEN RCVD TLK: CPT | Performed by: ORTHOPAEDIC SURGERY

## 2020-12-28 PROCEDURE — 3017F COLORECTAL CA SCREEN DOC REV: CPT | Performed by: ORTHOPAEDIC SURGERY

## 2020-12-28 PROCEDURE — G8417 CALC BMI ABV UP PARAM F/U: HCPCS | Performed by: ORTHOPAEDIC SURGERY

## 2020-12-28 PROCEDURE — G8484 FLU IMMUNIZE NO ADMIN: HCPCS | Performed by: ORTHOPAEDIC SURGERY

## 2020-12-28 PROCEDURE — G8428 CUR MEDS NOT DOCUMENT: HCPCS | Performed by: ORTHOPAEDIC SURGERY

## 2020-12-28 PROCEDURE — 99213 OFFICE O/P EST LOW 20 MIN: CPT | Performed by: ORTHOPAEDIC SURGERY

## 2020-12-28 RX ORDER — LIDOCAINE HYDROCHLORIDE 10 MG/ML
20 INJECTION, SOLUTION INFILTRATION; PERINEURAL ONCE
Status: COMPLETED | OUTPATIENT
Start: 2020-12-28 | End: 2020-12-28

## 2020-12-28 RX ADMIN — LIDOCAINE HYDROCHLORIDE 20 ML: 10 INJECTION, SOLUTION INFILTRATION; PERINEURAL at 16:44

## 2020-12-29 NOTE — PROGRESS NOTES
MD Vy Ac[place], [place]         Orthopaedic Surgery and Sports Medicine    Patient Name: Marva Bond  YOB: 1965  Patient's PCP is XIAO Osorio CNP    SUBJECTIVE  Chief Complaint:  Knee Pain (RIGHT     DUROLANE)      History of Present Illness:  Marva Bond is a 63 y.o. female here regarding previously documented right knee arthritis. The patient is currently ambulating independently      Location: medial  Quality: aching  Pain Scale: 7/10  Context: overall course is worsening  Alleviating Factors: rest  Exacerbating Factors: activity  Associated Symptoms: swelling     Sleep pattern is affected by the chief complaint: Yes  The patient has had PT. The patient has had an injection. The patient has taken NSAIDs. In the past    The patient is working. Patient denies any new injuries. At the patient's previous visit, we discussed the possibility of using Durolane viscosupplementation injections and the patient would like to proceed with that today. Pain Assessment:       Review of Systems:  Dahlia HERNANDEZ [place]'s review of systems has been performed by intake and observation. All past and current ROS forms have been scanned into the medical record. She has been instructed to contact her primary care provider regarding ROS issues if not already being addressed at this time. There are no recent changes.  The most recent ROS was scanned into media on 9/16/2020    Past Medical History:  (see most recent intake form scanned into media on above date)  Past Medical History:   Diagnosis Date    Anxiety     Arthritis     Asthma     CAD (coronary artery disease)     Cancer ([address]     skin \"precancerous\"    CHF (congestive heart failure) ([address]     COPD (chronic obstructive pulmonary disease) (HCC)     Cystitis, interstitial     Depression  MULTIPLE VITAMIN PO Take by mouth      lisinopril (PRINIVIL;ZESTRIL) 10 MG tablet Take 1 tablet by mouth 2 times daily 180 tablet 3    acetaminophen (TYLENOL) 500 MG tablet Take 500 mg by mouth every 6 hours as needed for Pain      FLUoxetine (PROZAC) 20 MG capsule Take 20 mg by mouth daily      albuterol (PROVENTIL HFA;VENTOLIN HFA) 108 (90 BASE) MCG/ACT inhaler Use 2 puffs 4 times daily for 7 days then as needed for wheezing. Dispense with Spacer and instruct in use. At patient's preference may use 60 dose MDI. May Sub Pro-Air or Proventil as needed per insurance. 1 Inhaler 0     No current facility-administered medications for this visit. OBJECTIVE  PHYSICAL EXAM  Vital Signs: There were no vitals filed for this visit. Body mass index is 25.75 kg/m². General Appearance: Patient is adequately groomed with no evidence of malnutrition   Orientation: Patient is alert and oriented to person, place and time  Mood and Affect: Neutral/Euthymic(normal)  Gait and Station: abnormal and antalgic. The patient can bear weight on the extremity. Right Knee Examination  Inspection:  Knee alignment: neutral           mild swelling noted. No erythema or ecchymosis. Palpation: mild tenderness to palpation on the medial joint line. a small effusion noted. Range of Motion:  somewhat limited due to pain    Stability and Special Testing: Jimmy's test: negative             Laxity with varus stress testing: negative             Laxity with valgus stress testing: negative    Strength: No gross motor weakness noted. All muscle groups appear to be functioning. Motor exam of the lower extremities show quadriceps, hamstrings, foot dorsiflexion and plantarflexion grossly intact. Neurologic: Sensation to both feet is grossly intact to light touch. Vascular: The bilateral lower extremities are warm and well-perfused with brisk capillary refill. Lymphatic: The lymphatic examination bilaterally reveals all areas to be without enlargement or induration    Skin: intact with no cellulitis, rashes, ulcerations, lymphedema or cutaneous lesions noted. DIAGNOSTICS  Xrays obtained in office today: No  Xrays reviewed today: Yes  4 views of the right knee show   Fracture: No   Dislocation: No  Patellofemoral arthritis: mild  Medial compartment: moderate  Lateral compartment: mild  Varus deformity: No  Valgus deformity: No      ASSESSMENT (Medical Decision Making)    Melo Valencia is a 63 y.o. female with the following diagnosis: right knee arthritis      ICD-10-CM    1. Arthritis of right knee  M17.11 US ARTHR/ASP/INJ MAJOR JNT/BURSA RIGHT     OSR PT - [facility] [place] ([facility]) Physical Therapy     CANCELED: 20610 - PR DRAIN/INJECT LARGE JOINT/BURSA           PLAN (Medical Decision Making)  Office Procedures:  Orders Placed This Encounter   Procedures    US ARTHR/ASP/INJ MAJOR JNT/BURSA RIGHT    OSR PT - [facility] ([facility]) Physical Therapy     Referral Priority:   Routine     Referral Type:   Eval and Treat     Referral Reason:   Specialty Services Required     Requested Specialty:   Physical Therapy     Number of Visits Requested:   1       The patients right knee was initially prepped using Betadine swab. The superior lateral aspect of the knee was prepped and used for this injection. Under aseptic technique the soft tissues were anesthetized using 1% Lidocaine without epinephrine. A total of 2ml of Lidocaine was injected into the soft tissues leading up to the joint capsule. Following adequate anesthesia, the 3.0ml of Durolane injection was performed. This was injected directly into the joint capsule of the knee. No complications were encountered and the patient tolerated the procedure well. A band aid was placed over the injection site following the procedure.     Treatment Plan:   1) ice, elevation, gentle range of motion as needed for swelling or stiffness of the knee 2) NSAIDs for any pain after injection   3) F/U as needed    Johnathan Cali was instructed to call the office if her symptoms worsen or if new symptoms appear prior to the next scheduled visit. She is specifically instructed to contact the office between now and schedule appointment if she has concerns related to her condition or if she needs assistance in scheduling any above tests. She is welcome to call for an appointment sooner if she has any additional concerns or questions. This dictation was performed with a verbal recognition program ([employer]) and it was checked for errors. It is possible that there are still dictated errors within this office note. If so, please bring any errors to my attention for an addendum. All efforts were made to ensure that this office note is accurate.

## 2021-01-28 RX ORDER — ASPIRIN 81 MG/1
81 TABLET ORAL DAILY
Qty: 90 TABLET | Refills: 3 | Status: SHIPPED | OUTPATIENT
Start: 2021-01-28 | End: 2021-09-14

## 2021-01-28 RX ORDER — CARVEDILOL 6.25 MG/1
6.25 TABLET ORAL 2 TIMES DAILY WITH MEALS
Qty: 180 TABLET | Refills: 3 | Status: SHIPPED | OUTPATIENT
Start: 2021-01-28 | End: 2021-02-09 | Stop reason: SDUPTHER

## 2021-01-28 NOTE — TELEPHONE ENCOUNTER
Pt is calling for refill on   Coreg 6.25 mg one bid  ASA 81 mg     [facility]    Pt is scheduled to see [facility] on 2.9.21.

## 2021-02-08 NOTE — PROGRESS NOTES
2021    Follow-up, Coronary Artery Disease, Congestive Heart Failure, Hypertension, Cardiomyopathy, Chest Pain (at times), and Shortness of Breath       TELEHEALTH EVALUATION -- Audio (telephone) (During [facility]-96 public health emergency)    HPI: Lico Ponce is a 61 y.o. female who is having a telephone visit today for a history of  coronary artery disease- left heart cath on 19 due to non-obstructive coronary artery disease, LVEF 30% by LV gram (echo showed EF at 35%), cough elevated troponin's. Echocardiogram in 10/2019 showed EF has improved to 55-60%. She has a history of alcohol use. Today she states she has been feeling well overall. She has been having fatigue often. She states she has been sleeping a lot. She is unsure if she snores. She has never been tested for sleep apnea. She states she has been having some SOB and chest pain that is not new. She continues to smoke but has cut back to 1/2 ppd. Blood pressure today is elevated but she has not been checking it on a regular basis. She is tolerating her medications. Patient currently denies any weight gain, edema, palpitations, dizziness, and syncope. Lico Ponce (:  1965) has requested an audio (telephone) evaluation for the following concern(s): patient is here for follow up for CAD. [x] Medications and dosages reviewed. ROS:  [x]Full ROS obtained and negative except as mentioned in HPI    Prior to Visit Medications    Medication Sig Taking? Authorizing Provider   aspirin EC 81 MG EC tablet Take 1 tablet by mouth daily Yes Romaine Henry MD   carvedilol (COREG) 6.25 MG tablet Take 1 tablet by mouth 2 times daily (with meals) Yes Romaine Henry MD   furosemide (LASIX) 20 MG tablet Take 1 tablet by mouth See Admin Instructions Daily and as needed if weight gain, increased shortness of breath, increased swelling.  Yes Romaine Henry MD vitamin B-1 (THIAMINE) 100 MG tablet Take 1 tablet by mouth daily Yes XIAO Grajeda CNP   potassium chloride (KLOR-CON M) 20 MEQ extended release tablet Take 1 tablet by mouth daily Yes Romaine Henry MD   atorvastatin (LIPITOR) 80 MG tablet Take 1 tablet by mouth nightly Yes Romaine Henry MD   fluticasone (FLOVENT HFA) 220 MCG/ACT inhaler Inhale 2 puffs into the lungs 2 times daily Yes XIAO Grajeda CNP   omeprazole (PRILOSEC) 20 MG delayed release capsule Take 20 mg by mouth daily Yes Historical Provider, MD   MULTIPLE VITAMIN PO Take by mouth Yes Historical Provider, MD   lisinopril (PRINIVIL;ZESTRIL) 10 MG tablet Take 1 tablet by mouth 2 times daily Yes Romaine Henry MD   acetaminophen (TYLENOL) 500 MG tablet Take 500 mg by mouth every 6 hours as needed for Pain Yes Historical Provider, MD   albuterol (PROVENTIL HFA;VENTOLIN HFA) 108 (90 BASE) MCG/ACT inhaler Use 2 puffs 4 times daily for 7 days then as needed for wheezing. Dispense with Spacer and instruct in use. At patient's preference may use 60 dose MDI. May Sub Pro-Air or Proventil as needed per insurance.  Yes José Miguel Ureña PA-C   FLUoxetine (PROZAC) 20 MG capsule Take 20 mg by mouth daily  Historical Provider, MD       Social History     Tobacco Use    Smoking status: Current Every Day Smoker     Packs/day: 0.50     Years: 40.00     Pack years: 20.00     Types: Cigarettes    Smokeless tobacco: Never Used    Tobacco comment: 6-10 cig daily   Substance Use Topics    Alcohol use: Not Currently     Comment: 6 X per year    Drug use: Not Currently     Frequency: 1.0 times per week     Types: Marijuana     Comment: none since 2014          Allergies   Allergen Reactions    Aldactone [Spironolactone]      Hives      Morphine Nausea And Vomiting   ,   Past Medical History:   Diagnosis Date    Anxiety     Arthritis     Asthma     CAD (coronary artery disease)     [facility]     skin \"precancerous\"  CHF (congestive heart failure) (HCC)     COPD (chronic obstructive pulmonary disease) (HCC)     Cystitis, interstitial     Depression     Diabetes mellitus (HCC)     borderline    DUB (dysfunctional uterine bleeding) 3/2013    GERD (gastroesophageal reflux disease)     Hypercholesterolemia     no meds currently    Hypertension     Kidney stones     Low blood potassium     PONV (postoperative nausea and vomiting)     Sleep apnea     never fitted for an appliance   ,   Past Surgical History:   Procedure Laterality Date    APPENDECTOMY      BLADDER SURGERY  2010    X 3     SECTION      COLONOSCOPY  2017    Polyps    DIAGNOSTIC CARDIAC CATH LAB PROCEDURE      ECTOPIC PREGNANCY SURGERY      HYSTERECTOMY N/A 13    ROBOTIC ASSISTED TOTAL LAPAROSCOPIC HYSTERECTOMY    KNEE ARTHROSCOPY Right 2017    LITHOTRIPSY      X 3    SKIN BIOPSY     ,   Social History     Tobacco Use    Smoking status: Current Every Day Smoker     Packs/day: 0.50     Years: 40.00     Pack years: 20.00     Types: Cigarettes    Smokeless tobacco: Never Used    Tobacco comment: 6-10 cig daily   Substance Use Topics    Alcohol use: Not Currently     Comment: 6 X per year    Drug use: Not Currently     Frequency: 1.0 times per week     Types: Marijuana     Comment: none since    ,   Family History   Problem Relation Age of Onset    High Blood Pressure Mother     Heart Disease Mother     Cancer Father         lung    High Blood Pressure Father    ,   Immunization History   Administered Date(s) Administered    Influenza, Quadv, IM, PF (6 mo and older Fluzone, Flulaval, Fluarix, and 3 yrs and older Afluria) 10/23/2019    Pneumococcal Conjugate 7-valent (Claudia Deleon) 2011   ,   Health Maintenance   Topic Date Due    Hepatitis C screen  1965    HIV screen  1979    Cervical cancer screen  1985    Breast cancer screen  2014    Shingles Vaccine (1 of 2) 2014  Colon cancer screen colonoscopy  02/01/2014    A1C test (Diabetic or Prediabetic)  07/13/2020    Lipid screen  10/23/2020    Potassium monitoring  07/15/2021    Creatinine monitoring  07/15/2021    DTaP/Tdap/Td vaccine (2 - Td) 07/25/2026    Flu vaccine  Completed    Pneumococcal 0-64 years Vaccine  Completed    Hepatitis A vaccine  Aged Out    Hepatitis B vaccine  Aged Out    Hib vaccine  Aged Out    Meningococcal (ACWY) vaccine  Aged Out       PHYSICAL EXAMINATION:  [ INSTRUCTIONS:  \"[x]\" Indicates a positive item  \"[]\" Indicates a negative item  -- DELETE ALL ITEMS NOT EXAMINED]  Vital Signs: (As obtained by patient/caregiver or practitioner observation)    Blood pressure- 132/97 Heart rate- 72   Respiratory rate-    Temperature-  Pulse oximetry-       Other pertinent observable physical exam findings-     Left heart cath 7/13/19  Procedures: LHC, LVG, CA, sedation     LM <20%  LAD 30%  Cx <20%  RCA 30%  LVEF 30%  Nonischemic cardiomyopathy        Limited echocardiogram 10/23/19  Summary   Limited only f/u for LVEF.   Normal left ventricular systolic function with ejection fraction of 55-60%.   No regional wall motion abnormalites are seen.   Compared to previous study from 9- no changes noted in left   ventricular function.      ASSESSMENT:  Chest pain, atypical.   Shortness of breath, I suspect this is not cardiac. More due to smoking  History of nonischemic cardiomyopathy, subsequently resolved on most  recent echocardiogram in 09/2019. Hypertension suboptimal.  Hyperlipidemia reviewed. stable  Diabetes. Smoking  Fatigue w/ snoring.  Suspect DARELL     Plan:   Smoking cessation discussed   Referral for sleep study   Increase Coreg to 12.5 mg twice a day for better blood pressure control   Increase Lisinopril to 20 mg daily for better blood pressure control   Continue other medications Check blood pressure at home dailt after once week of increase in medications. Call with numbers over 140/90  Regular exercise and following a healthy diet encouraged   Follow up with NP in 6 months       Haven Dumont is a 61 y.o. female being evaluated by a telephone visit encounter to address concerns as mentioned above. A caregiver was present when appropriate. Due to this being a TeleHealth encounter (During [facility]-83 public health emergency), evaluation of the following organ systems was limited: Vitals/Constitutional/EENT/Resp/CV/GI//MS/Neuro/Skin/Heme-Lymph-Imm. Pursuant to the emergency declaration under the [facility], [address] [place] [address] authority and the [employer] Act, this Virtual Visit was conducted with patient's (and/or legal guardian's) consent, to reduce the patient's risk of exposure to COVID-19 and provide necessary medical care. The patient (and/or legal guardian) has also been advised to contact this office for worsening conditions or problems, and seek emergency medical treatment and/or call 911 if deemed necessary. Patient is having a telephone visit today for follow up lasting approximately 11-20 minutes. Scribe's attestation: This note was scribed in the presence of Dr. Dorys Roberto M.D. By Francisca Jones RN    The scribes documentation has been prepared under my direction and personally reviewed by me in its entirety. I confirm that the note above accurately reflects all work, treatment, procedures, and medical decision making performed by me. Dr. Dorys Roberto MD      Services were provided through a telephone synchronous discussion virtually to substitute for in-person clinic visit. I am seeing the patient today from my office and the patient from their home. --Francisca Jones RN on 2/9/2021 at 10:09 AM    An electronic signature was used to authenticate this note.

## 2021-02-09 ENCOUNTER — VIRTUAL VISIT (OUTPATIENT)
Dept: CARDIOLOGY CLINIC | Age: 56
End: 2021-02-09
Payer: COMMERCIAL

## 2021-02-09 DIAGNOSIS — I25.10 CORONARY ARTERY DISEASE INVOLVING NATIVE CORONARY ARTERY OF NATIVE HEART WITHOUT ANGINA PECTORIS: Primary | ICD-10-CM

## 2021-02-09 DIAGNOSIS — G47.30 SLEEP APNEA, UNSPECIFIED TYPE: ICD-10-CM

## 2021-02-09 DIAGNOSIS — I10 ESSENTIAL HYPERTENSION: ICD-10-CM

## 2021-02-09 DIAGNOSIS — I21.4 NSTEMI (NON-ST ELEVATED MYOCARDIAL INFARCTION) (HCC): ICD-10-CM

## 2021-02-09 DIAGNOSIS — Z72.0 TOBACCO USE: ICD-10-CM

## 2021-02-09 PROCEDURE — 99442 PR PHYS/QHP TELEPHONE EVALUATION 11-20 MIN: CPT | Performed by: INTERNAL MEDICINE

## 2021-02-09 RX ORDER — CARVEDILOL 12.5 MG/1
12.5 TABLET ORAL 2 TIMES DAILY WITH MEALS
Qty: 180 TABLET | Refills: 3 | Status: SHIPPED | OUTPATIENT
Start: 2021-02-09 | End: 2021-03-19

## 2021-02-09 RX ORDER — LISINOPRIL 20 MG/1
20 TABLET ORAL 2 TIMES DAILY
Qty: 90 TABLET | Refills: 3 | Status: SHIPPED | OUTPATIENT
Start: 2021-02-09 | End: 2021-03-19

## 2021-02-09 NOTE — PATIENT INSTRUCTIONS
Plan:   Smoking cessation discussed   Referral for sleep study   Increase Coreg to 12.5 mg twice a day for better blood pressure control   Increase Lisinopril to 20 mg daily for better blood pressure control   Continue other medications   Check blood pressure at home dailt after once week of increase in medications.  Call with numbers over 140/90  Regular exercise and following a healthy diet encouraged   Follow up with NP in 6 months

## 2021-02-09 NOTE — LETTER
2021    Follow-up, Coronary Artery Disease, Congestive Heart Failure, Hypertension, Cardiomyopathy, Chest Pain (at times), and Shortness of Breath       TELEHEALTH EVALUATION -- Audio (telephone) (During [facility]-76 public health emergency)    HPI: Michelle Chairez is a 61 y.o. female who is having a telephone visit today for a history of  coronary artery disease- left heart cath on 19 due to non-obstructive coronary artery disease, LVEF 30% by LV gram (echo showed EF at 35%), cough elevated troponin's. Echocardiogram in 10/2019 showed EF has improved to 55-60%. She has a history of alcohol use. Today she states she has been feeling well overall. She has been having fatigue often. She states she has been sleeping a lot. She is unsure if she snores. She has never been tested for sleep apnea. She states she has been having some SOB and chest pain that is not new. She continues to smoke but has cut back to 1/2 ppd. Blood pressure today is elevated but she has not been checking it on a regular basis. She is tolerating her medications. Patient currently denies any weight gain, edema, palpitations, dizziness, and syncope. Michelle Chairez (:  1965) has requested an audio (telephone) evaluation for the following concern(s): patient is here for follow up for CAD. [x] Medications and dosages reviewed. ROS:  [x]Full ROS obtained and negative except as mentioned in HPI    Prior to Visit Medications    Medication Sig Taking? Authorizing Provider   aspirin EC 81 MG EC tablet Take 1 tablet by mouth daily Yes Ana Lilia Milton MD   carvedilol (COREG) 6.25 MG tablet Take 1 tablet by mouth 2 times daily (with meals) Yes Ana Lilia Milton MD   furosemide (LASIX) 20 MG tablet Take 1 tablet by mouth See Admin Instructions Daily and as needed if weight gain, increased shortness of breath, increased swelling.  Yes Ana Lilia Milton MD vitamin B-1 (THIAMINE) 100 MG tablet Take 1 tablet by mouth daily Yes XIAO Amato CNP   potassium chloride (KLOR-CON M) 20 MEQ extended release tablet Take 1 tablet by mouth daily Yes Murray Ramos MD   atorvastatin (LIPITOR) 80 MG tablet Take 1 tablet by mouth nightly Yes Murray Ramos MD   fluticasone (FLOVENT HFA) 220 MCG/ACT inhaler Inhale 2 puffs into the lungs 2 times daily Yes XIAO Amato CNP   omeprazole (PRILOSEC) 20 MG delayed release capsule Take 20 mg by mouth daily Yes Historical Provider, MD   MULTIPLE VITAMIN PO Take by mouth Yes Historical Provider, MD   lisinopril (PRINIVIL;ZESTRIL) 10 MG tablet Take 1 tablet by mouth 2 times daily Yes Murray Ramos MD   acetaminophen (TYLENOL) 500 MG tablet Take 500 mg by mouth every 6 hours as needed for Pain Yes Historical Provider, MD   albuterol (PROVENTIL HFA;VENTOLIN HFA) 108 (90 BASE) MCG/ACT inhaler Use 2 puffs 4 times daily for 7 days then as needed for wheezing. Dispense with Spacer and instruct in use. At patient's preference may use 60 dose MDI. May Sub Pro-Air or Proventil as needed per insurance.  Yes Eileen Rivero PA-C   FLUoxetine (PROZAC) 20 MG capsule Take 20 mg by mouth daily  Historical Provider, MD       Social History     Tobacco Use    Smoking status: Current Every Day Smoker     Packs/day: 0.50     Years: 40.00     Pack years: 20.00     Types: Cigarettes    Smokeless tobacco: Never Used    Tobacco comment: 6-10 cig daily   Substance Use Topics    Alcohol use: Not Currently     Comment: 6 X per year    Drug use: Not Currently     Frequency: 1.0 times per week     Types: Marijuana     Comment: none since 2014          Allergies   Allergen Reactions    Aldactone [Spironolactone]      Hives      Morphine Nausea And Vomiting   ,   Past Medical History:   Diagnosis Date    Anxiety     Arthritis     Asthma     CAD (coronary artery disease)     Cancer ([facility]     skin \"precancerous\"  CHF (congestive heart failure) (HCC)     COPD (chronic obstructive pulmonary disease) (HCC)     Cystitis, interstitial     Depression     Diabetes mellitus (HCC)     borderline    DUB (dysfunctional uterine bleeding) 3/2013    GERD (gastroesophageal reflux disease)     Hypercholesterolemia     no meds currently    Hypertension     Kidney stones     Low blood potassium     PONV (postoperative nausea and vomiting)     Sleep apnea     never fitted for an appliance   ,   Past Surgical History:   Procedure Laterality Date    APPENDECTOMY      BLADDER SURGERY  2010    X 3     SECTION      COLONOSCOPY  2017    Polyps    DIAGNOSTIC CARDIAC CATH LAB PROCEDURE      ECTOPIC PREGNANCY SURGERY      HYSTERECTOMY N/A 13    ROBOTIC ASSISTED TOTAL LAPAROSCOPIC HYSTERECTOMY    KNEE ARTHROSCOPY Right 2017    LITHOTRIPSY      X 3    SKIN BIOPSY     ,   Social History     Tobacco Use    Smoking status: Current Every Day Smoker     Packs/day: 0.50     Years: 40.00     Pack years: 20.00     Types: Cigarettes    Smokeless tobacco: Never Used    Tobacco comment: 6-10 cig daily   Substance Use Topics    Alcohol use: Not Currently     Comment: 6 X per year    Drug use: Not Currently     Frequency: 1.0 times per week     Types: Marijuana     Comment: none since    ,   Family History   Problem Relation Age of Onset    High Blood Pressure Mother     Heart Disease Mother     Cancer Father         lung    High Blood Pressure Father    ,   Immunization History   Administered Date(s) Administered    Influenza, Quadv, IM, PF (6 mo and older Fluzone, Flulaval, Fluarix, and 3 yrs and older Afluria) 10/23/2019    Pneumococcal Conjugate 7-valent (Carolyn Apple) 2011   ,   Health Maintenance   Topic Date Due    Hepatitis C screen  1965    HIV screen  1979    Cervical cancer screen  1985    Breast cancer screen  2014    Shingles Vaccine (1 of 2) 2014  Colon cancer screen colonoscopy  02/01/2014    A1C test (Diabetic or Prediabetic)  07/13/2020    Lipid screen  10/23/2020    Potassium monitoring  07/15/2021    Creatinine monitoring  07/15/2021    DTaP/Tdap/Td vaccine (2 - Td) 07/25/2026    Flu vaccine  Completed    Pneumococcal 0-64 years Vaccine  Completed    Hepatitis A vaccine  Aged Out    Hepatitis B vaccine  Aged Out    Hib vaccine  Aged Out    Meningococcal (ACWY) vaccine  Aged Out       PHYSICAL EXAMINATION:  [ INSTRUCTIONS:  \"[x]\" Indicates a positive item  \"[]\" Indicates a negative item  -- DELETE ALL ITEMS NOT EXAMINED]  Vital Signs: (As obtained by patient/caregiver or practitioner observation)    Blood pressure- 132/97 Heart rate- 72   Respiratory rate-    Temperature-  Pulse oximetry-       Other pertinent observable physical exam findings-     Left heart cath 7/13/19  Procedures: LHC, LVG, CA, sedation     LM <20%  LAD 30%  Cx <20%  RCA 30%  LVEF 30%  Nonischemic cardiomyopathy        Limited echocardiogram 10/23/19  Summary   Limited only f/u for LVEF.   Normal left ventricular systolic function with ejection fraction of 55-60%.   No regional wall motion abnormalites are seen.   Compared to previous study from 9- no changes noted in left   ventricular function.      ASSESSMENT:  Chest pain, atypical.   Shortness of breath, I suspect this is not cardiac. More due to smoking  History of nonischemic cardiomyopathy, subsequently resolved on most  recent echocardiogram in 09/2019. Hypertension suboptimal.  Hyperlipidemia reviewed. stable  Diabetes. Smoking  Fatigue w/ snoring.  Suspect DARELL     Plan:   Smoking cessation discussed   Referral for sleep study   Increase Coreg to 12.5 mg twice a day for better blood pressure control   Increase Lisinopril to 20 mg daily for better blood pressure control   Continue other medications Check blood pressure at home dailt after once week of increase in medications. Call with numbers over 140/90  Regular exercise and following a healthy diet encouraged   Follow up with NP in 6 months       Peter Roblero is a 61 y.o. female being evaluated by a telephone visit encounter to address concerns as mentioned above. A caregiver was present when appropriate. Due to this being a TeleHealth encounter (During [facility]-87 public health emergency), evaluation of the following organ systems was limited: Vitals/Constitutional/EENT/Resp/CV/GI//MS/Neuro/Skin/Heme-Lymph-Imm. Pursuant to the emergency declaration under the [facility], [address] [place] [address] authority and the [employer] Act, this Virtual Visit was conducted with patient's (and/or legal guardian's) consent, to reduce the patient's risk of exposure to COVID-19 and provide necessary medical care. The patient (and/or legal guardian) has also been advised to contact this office for worsening conditions or problems, and seek emergency medical treatment and/or call 911 if deemed necessary. Patient is having a telephone visit today for follow up lasting approximately 11-20 minutes. Scribe's attestation: This note was scribed in the presence of Dr. Mimi Paige M.D. By Alex Roberto RN    The scribes documentation has been prepared under my direction and personally reviewed by me in its entirety. I confirm that the note above accurately reflects all work, treatment, procedures, and medical decision making performed by me. Dr. Mimi Paige MD      Services were provided through a telephone synchronous discussion virtually to substitute for in-person clinic visit. I am seeing the patient today from my office and the patient from their home. --Alex Roberto RN on 2/9/2021 at 10:09 AM    An electronic signature was used to authenticate this note.

## 2021-02-23 ENCOUNTER — TELEPHONE (OUTPATIENT)
Dept: PULMONOLOGY | Age: 56
End: 2021-02-23

## 2021-02-23 ENCOUNTER — TELEPHONE (OUTPATIENT)
Dept: CARDIOLOGY CLINIC | Age: 56
End: 2021-02-23

## 2021-02-23 NOTE — TELEPHONE ENCOUNTER
Within this Telehealth Consent, the terms you and yours refer to the person using the Telehealth Service (Service), or in the case of a use of the Service by or on behalf of a minor, you and yours refer to and include (i) the parent or legal guardian who provides consent to the use of the Service by such minor or uses the Service on behalf of such minor, and (ii) the minor for whom consent is being provided or on whose behalf the Service is being utilized. When using Service, you will be consulting with your health care providers via the use of Telehealth.   Telehealth involves the delivery of healthcare services using electronic communications, information technology or other means between a healthcare provider and a patient who are not in the same physical location. Telehealth may be used for diagnosis, treatment, follow-up and/or patient education, and may include, but is not limited to, one or more of the following:    Electronic transmission of medical records, photo images, personal health information or other data between a patient and a healthcare provider    Interactions between a patient and healthcare provider via audio, video and/or data communications    Use of output data from medical devices, sound and video files    Anticipated Benefits   The use of Telehealth by your Provider(s) through the Service may have the following possible benefits:    Making it easier and more efficient for you to access medical care and treatment for the conditions treated by such Provider(s) utilizing the Service    Allowing you to obtain medical care and treatment by Provider(s) at times that are convenient for you    Enabling you to interact with Provider(s) without the necessity of an in-office appointment     Possible Risks   While the use of Telehealth can provide potential benefits for you, there are also potential risks associated with the use of Telehealth.  These risks include, but may not be limited to the following:    Your Provider(s) may not able to provide medical treatment for your particular condition and you may be required to seek alternative healthcare or emergency care services.  The electronic systems or other security protocols or safeguards used in the Service could fail, causing a breach of privacy of your medical or other information.  Given regulatory requirements in certain jurisdictions, your Provider(s) diagnosis and/or treatment options, especially pertaining to certain prescriptions, may be limited. Acceptance   1. You understand that Services will be provided via Telehealth. This process involves the use of HIPAA compliant and secure, real-time audio-visual interfacing with a qualified and appropriately trained provider located at [facility]. 2. You understand that, under no circumstances, will this session be recorded. 3. You understand that the Provider(s) at [facility] and other clinical participants will be party to the information obtained during the Telehealth session in accordance with best medical practices. 4. You understand that the information obtained during the Telehealth session will be used to help determine the most appropriate treatment options. 5. You understand that You have the right to revoke this consent at any point in time. 6. You understand that Telehealth is voluntary, and that continued treatment is not dependent upon consent. 7. You understand that, in the event of non-consent to Telehealth services and/or technical difficulties, you will obtain services as typically provided in the absence of Telehealth technology. 8. You understand that this consent will be kept in Your medical record. 9. No potential benefits from the use of Telehealth or specific results can be guaranteed. Your condition may not be cured or improved and, in some cases, may get worse.    10. There are limitations in the provision of medical care and treatment via Telehealth and the Service and you may not be able to receive diagnosis and/or treatment through the Service for every condition for which you seek diagnosis and/or treatment. 11. There are potential risks to the use of Telehealth, including but not limited to the risks described in this Telehealth Consent. 12. Your Provider(s) have discussed the use of Telehealth and the Service with you, including the benefits and risks of such and you have provided oral consent to your Provider(s) for the use of Telehealth and the Service. 15. You understand that it is your duty to provide your Provider(s) truthful, accurate and complete information, including all relevant information regarding care that you may have received or may be receiving from other healthcare providers outside of the Service. 14. You understand that each of your Provider(s) may determine in his or sole discretion that your condition is not suitable for diagnosis and/or treatment using the Service, and that you may need to seek medical care and treatment a specialist or other healthcare provider, outside of the Service. 15. You understand that you are fully responsible for payment for all services provided by Provider(s) or through use of the Service and that you may not be able to use third-party insurance. 16. You represent that (a) you have read this Telehealth Consent carefully, (b) you understand the risks and benefits of the Service and the use of Telehealth in the medical care and treatment provided to you by Provider(s) using the Service, and (c) you have the legal capacity and authority to provide this consent for yourself and/or the minor for which you are consenting under applicable federal and state laws, including laws relating to the age of [de-identified] and/or parental/guardian consent.    17. You give your informed consent to the use of Telehealth by Provider(s) using the Service under

## 2021-02-25 RX ORDER — BLOOD PRESSURE TEST KIT
KIT MISCELLANEOUS
Qty: 1 KIT | Refills: 0 | Status: CANCELLED | OUTPATIENT
Start: 2021-02-25

## 2021-02-25 RX ORDER — BLOOD PRESSURE TEST KIT
KIT MISCELLANEOUS
Qty: 1 KIT | Refills: 0 | Status: SHIPPED | OUTPATIENT
Start: 2021-02-25

## 2021-02-25 NOTE — TELEPHONE ENCOUNTER
Pt states a couple weeks ago medication was adjusted. She has been feeling very dizzy, fatigue and seeing white dots. She says BP has been low and she seen PCP yesterday. PCP said to call cardiology. PCP said to take coreg 1 tab twice daily. She wants to know if that is ok and should she continue to take lisinopril BID?

## 2021-03-01 NOTE — TELEPHONE ENCOUNTER
Called and gave verbal orders for continuation of homecare.    Elodia Queen RN on 3/9/2020 at 4:12 PM     Spoke to pt. Let her know per [facility] to reduce her Lisinopril 10 mg BID, and continue the Coreg 12.5 mg BID.  Pt will call us and let us know if symptoms persist. Pt V/U.

## 2021-03-02 ENCOUNTER — VIRTUAL VISIT (OUTPATIENT)
Dept: PULMONOLOGY | Age: 56
End: 2021-03-02
Payer: COMMERCIAL

## 2021-03-02 ENCOUNTER — TELEPHONE (OUTPATIENT)
Dept: PULMONOLOGY | Age: 56
End: 2021-03-02

## 2021-03-02 DIAGNOSIS — G47.10 HYPERSOMNIA: ICD-10-CM

## 2021-03-02 DIAGNOSIS — G47.30 OBSERVED SLEEP APNEA: ICD-10-CM

## 2021-03-02 DIAGNOSIS — J43.9 PULMONARY EMPHYSEMA, UNSPECIFIED EMPHYSEMA TYPE (HCC): ICD-10-CM

## 2021-03-02 DIAGNOSIS — Z87.891 PERSONAL HISTORY OF TOBACCO USE, PRESENTING HAZARDS TO HEALTH: Primary | ICD-10-CM

## 2021-03-02 DIAGNOSIS — R06.83 SNORING: ICD-10-CM

## 2021-03-02 PROCEDURE — 99215 OFFICE O/P EST HI 40 MIN: CPT | Performed by: INTERNAL MEDICINE

## 2021-03-02 RX ORDER — ALBUTEROL SULFATE 90 UG/1
2 AEROSOL, METERED RESPIRATORY (INHALATION)
Qty: 1 INHALER | Refills: 5 | Status: SHIPPED | OUTPATIENT
Start: 2021-03-02 | End: 2021-12-29 | Stop reason: SDUPTHER

## 2021-03-02 ASSESSMENT — SLEEP AND FATIGUE QUESTIONNAIRES
HOW LIKELY ARE YOU TO NOD OFF OR FALL ASLEEP WHILE SITTING QUIETLY AFTER LUNCH WITHOUT ALCOHOL: 0
HOW LIKELY ARE YOU TO NOD OFF OR FALL ASLEEP WHEN YOU ARE A PASSENGER IN A CAR FOR AN HOUR WITHOUT A BREAK: 1
HOW LIKELY ARE YOU TO NOD OFF OR FALL ASLEEP WHILE SITTING AND READING: 3
HOW LIKELY ARE YOU TO NOD OFF OR FALL ASLEEP WHILE LYING DOWN TO REST IN THE AFTERNOON WHEN CIRCUMSTANCES PERMIT: 1
HOW LIKELY ARE YOU TO NOD OFF OR FALL ASLEEP WHILE SITTING INACTIVE IN A PUBLIC PLACE: 0

## 2021-03-02 NOTE — PROGRESS NOTES
[facility]                                                          TELEHEALTH EVALUATION: Service performed was Audio/Visual (During BTGHR-10 public health emergency) and not a face-to-face visit           CHIEF COMPLAINT: Sleep apnea    Consulting provider: Dr. Cyndee Rodriguez    HPI:   Was diagnosed with severe DARELL 18-20 years ago. She did not follow up. Associated with snoring, witnessed apnea and hypersomnia. Not sure what makes better or worse. Lost weight and somewhat better. Denied waking up at night choking and gasping for air. +  dry mouth upon awakening. Patient is complaining of daytime sleepiness, fatigue and tiredness at times during the day. Bedtime 2-4 pm and rise time is 10-12 pm. Sleep onset 30-60 minutes. No car wrecks or near wrecks because of the sleepiness. No nodding off while driving. Drinks 5 caffinated beverages per day. Smoker 1 ppd for 45 years  Has Flovet does not use   Has RUGGIERO  Cough with clear to brown sputum   Uses Albuterol 1-2 times/week   Old records were reviewed and summarized by me.        Past Medical History:   Diagnosis Date    Anxiety     Arthritis     Asthma     CAD (coronary artery disease)     Cancer ([facility] [address]     skin \"precancerous\"    CHF (congestive heart failure) (HCC)     COPD (chronic obstructive pulmonary disease) (HCC)     Cystitis, interstitial     Depression     Diabetes mellitus ([facility] [address]     borderline    DUB (dysfunctional uterine bleeding) 3/2013    GERD (gastroesophageal reflux disease)     Hypercholesterolemia     no meds currently    Hypertension     Kidney stones     Low blood potassium     PONV (postoperative nausea and vomiting)     Sleep apnea     never fitted for an appliance       Past Surgical History:        Procedure Laterality Date    APPENDECTOMY      BLADDER SURGERY  2010    X 3     SECTION      COLONOSCOPY  2017    Polyps  DIAGNOSTIC CARDIAC CATH LAB PROCEDURE      ECTOPIC PREGNANCY SURGERY      HYSTERECTOMY N/A 6-11-13    ROBOTIC ASSISTED TOTAL LAPAROSCOPIC HYSTERECTOMY    KNEE ARTHROSCOPY Right 09/12/2017    LITHOTRIPSY      X 3    SKIN BIOPSY         Allergies:  is allergic to aldactone [spironolactone] and morphine. Social History:    TOBACCO:   reports that she has been smoking cigarettes. She has a 20.00 pack-year smoking history. She has never used smokeless tobacco.  ETOH:   reports previous alcohol use. Family History:       Problem Relation Age of Onset    High Blood Pressure Mother     Heart Disease Mother     Cancer Father         lung    High Blood Pressure Father        Current Medications:    Current Outpatient Medications:     aspirin EC 81 MG EC tablet, Take 1 tablet by mouth daily, Disp: 90 tablet, Rfl: 3    atorvastatin (LIPITOR) 80 MG tablet, Take 1 tablet by mouth nightly, Disp: 30 tablet, Rfl: 11    albuterol (PROVENTIL HFA;VENTOLIN HFA) 108 (90 BASE) MCG/ACT inhaler, Use 2 puffs 4 times daily for 7 days then as needed for wheezing. Dispense with Spacer and instruct in use. At patient's preference may use 60 dose MDI. May Sub Pro-Air or Proventil as needed per insurance., Disp: 1 Inhaler, Rfl: 0    Blood Pressure KIT, Take bp once daily. , Disp: 1 kit, Rfl: 0    carvedilol (COREG) 12.5 MG tablet, Take 1 tablet by mouth 2 times daily (with meals), Disp: 180 tablet, Rfl: 3    lisinopril (PRINIVIL;ZESTRIL) 20 MG tablet, Take 1 tablet by mouth 2 times daily (Patient taking differently: Take 10 mg by mouth 2 times daily Take 10 mg BID), Disp: 90 tablet, Rfl: 3    furosemide (LASIX) 20 MG tablet, Take 1 tablet by mouth See Admin Instructions Daily and as needed if weight gain, increased shortness of breath, increased swelling., Disp: 180 tablet, Rfl: 1    vitamin B-1 (THIAMINE) 100 MG tablet, Take 1 tablet by mouth daily, Disp: 30 tablet, Rfl: 3   potassium chloride (KLOR-CON M) 20 MEQ extended release tablet, Take 1 tablet by mouth daily, Disp: 30 tablet, Rfl: 11    fluticasone (FLOVENT HFA) 220 MCG/ACT inhaler, Inhale 2 puffs into the lungs 2 times daily, Disp: 1 Inhaler, Rfl: 5    omeprazole (PRILOSEC) 20 MG delayed release capsule, Take 20 mg by mouth daily, Disp: , Rfl:     MULTIPLE VITAMIN PO, Take by mouth, Disp: , Rfl:     acetaminophen (TYLENOL) 500 MG tablet, Take 500 mg by mouth every 6 hours as needed for Pain, Disp: , Rfl:     FLUoxetine (PROZAC) 20 MG capsule, Take 20 mg by mouth daily, Disp: , Rfl:       REVIEW OF SYSTEMS:  Constitutional: Negative for fever  HENT: Negative for sore throat  Eyes: Negative for redness   Respiratory:+ dyspnea, cough  Cardiovascular: Negative for chest pain  Gastrointestinal: Negative for vomiting, diarrhea   Genitourinary: Negative for hematuria   Musculoskeletal: Negative for arthralgias   Skin: Negative for rash  Neurological: Negative for syncope  Hematological: Negative for adenopathy  Psychiatric/Behavorial: Negative for anxiety      Objective:   PHYSICAL EXAM:    Last menstrual period 05/29/2013, not currently breastfeeding.' on RA  O2 Sat:  Neck size: NOt sure   Mallampati class IV. Temperature:  Constitutional:  No acute distress. Appears well developed and nourished. Eyes: No sclera icterus. EOM intact. No visible discharge. HENT: Head is normocephalic and atraumatic. Mucus membranes are moist and the tongue appears normal. Normal appearing nose. External Ears normal.   Neck: No visualized mass. Marci Vargas is midline   Resp: No accessory muscle use. Respiratory effort normal. No visualized signs of difficulty breathing or respiratory distress. Cardiovascular: No LE edema. Musculoskeletal: Normal gait with no signs of ataxia. Normal range of motion of the neck.   Skin: No significant exanthematous lesions or discoloration noted on facial skin Neuro: Awake. Alert. Able to follow commands. No facial asymmetry. No gaze palsy. Psych: No agitation. Normal affect. No hallucinations. Oriented to person/time/place. No anxiety. Normal judgement and insight. DATA reviewed by me:   CT chest 10/22/2019 imaging reviewed by me and showed  No PE  No acute cardiopulmonary disease    Assessment:       · Snoring  · Observed sleep apnea   · Hypersomnia and Fatigue   · SOB, cough and wheezes   · Mild emphysema   · Nonischemic cardiomyopathy and hypertension followed by cardiology  · 45 pack year smoking       Plan:       ? PSG evaluate for sleep related breathing disorder. ? Treatment options were discussed with patient if HST reveals DARELL, including CPAP therapy, oral appliances, upper airway surgery and hypoglossal nerve stimulation. ? Discussed with patient the pathophysiology of apnea. ? Sleep hygiene  ? Avoid sedatives, alcohol and caffeinated drinks at bed time. ? No driving motorized vehicles or operating heavy machinery while fatigue, drowsy or sleepy. ? Weight loss is also recommended as a long-term intervention. ? Complications of DARELL if not treated were discussed with patient patient to include systemic hypertension, pulmonary hypertension, cardiovascular morbidities, car accidents and all cause mortality. ? Trial Spiriva Respimat: Two inhalations (5 mcg) once daily (maximum: 2 inhalations per 24 hours)  ? PFTs and 6MW   ? Albuterol 2 puffs Q4-6 hrs PRN  ? D/C Flovent   ? CT chest, low dose protocol, screening for lung cancer. Risks, benefits and alternatives including doing nothing were discussed with patient. ? Smoking cessation counseling. Haven Dumont is a 61 y.o. female being evaluated by a Virtual Visit (video visit) encounter to address concerns as mentioned above. A caregiver was present when appropriate. Due to this being a TeleHealth encounter (During EUKWN-14 public health emergency), evaluation of the following organ systems was limited: Vitals/Constitutional/EENT/Resp/CV/GI//MS/Neuro/Skin/Heme-Lymph-Imm. Pursuant to the emergency declaration under the [address] [place] [address] and the [employer] and [employer] Act, this Virtual Visit was conducted with patient's (and/or legal guardian's) consent, to reduce the patient's risk of exposure to COVID-19 and provide necessary medical care. The patient (and/or legal guardian) has also been advised to contact this office for worsening conditions or problems, and seek emergency medical treatment and/or call 911 if deemed necessary. Services were provided through a video synchronous discussion virtually to substitute for in-person clinic visit. Patient was located in her home, provider was located in his office. --Renny Santos MD on 3/2/2021 at 1:07 PM    An electronic signature was used to authenticate this note.

## 2021-03-02 NOTE — TELEPHONE ENCOUNTER
[facility]- Pt needs scheduled for a PFT/6MWT, CT, PSG, and Follow up with Dr. Audrey Nascimento      LVV: 3/2/21    Assessment:       · Snoring  · Observed sleep apnea   · Hypersomnia and Fatigue   · SOB, cough and wheezes   · Mild emphysema   · Nonischemic cardiomyopathy and hypertension followed by cardiology  · 45 pack year smoking       Plan:       · PSG evaluate for sleep related breathing disorder. · Treatment options were discussed with patient if HST reveals DARELL, including CPAP therapy, oral appliances, upper airway surgery and hypoglossal nerve stimulation. · Discussed with patient the pathophysiology of apnea. · Sleep hygiene  · Avoid sedatives, alcohol and caffeinated drinks at bed time. · No driving motorized vehicles or operating heavy machinery while fatigue, drowsy or sleepy. · Weight loss is also recommended as a long-term intervention. · Complications of DARELL if not treated were discussed with patient patient to include systemic hypertension, pulmonary hypertension, cardiovascular morbidities, car accidents and all cause mortality. · Trial Spiriva Respimat: Two inhalations (5 mcg) once daily (maximum: 2 inhalations per 24 hours)  · PFTs and 6MW   · Albuterol 2 puffs Q4-6 hrs PRN  · D/C Flovent   · CT chest, low dose protocol, screening for lung cancer. Risks, benefits and alternatives including doing nothing were discussed with patient.   · Smoking cessation counseling.

## 2021-03-03 ENCOUNTER — HOSPITAL ENCOUNTER (OUTPATIENT)
Dept: CT IMAGING | Age: 56
Discharge: HOME | End: 2021-03-03
Payer: COMMERCIAL

## 2021-03-03 DIAGNOSIS — Z87.891 PERSONAL HISTORY OF TOBACCO USE, PRESENTING HAZARDS TO HEALTH: ICD-10-CM

## 2021-03-03 DIAGNOSIS — Z87.442 PERSONAL HISTORY OF RENAL CALCULI: ICD-10-CM

## 2021-03-03 PROCEDURE — 74176 CT ABD & PELVIS W/O CONTRAST: CPT

## 2021-03-19 ENCOUNTER — TELEPHONE (OUTPATIENT)
Dept: CARDIOLOGY CLINIC | Age: 56
End: 2021-03-19

## 2021-03-19 RX ORDER — LISINOPRIL 20 MG/1
10 TABLET ORAL 2 TIMES DAILY
Qty: 60 TABLET | Refills: 5 | Status: SHIPPED | OUTPATIENT
Start: 2021-03-19 | End: 2021-07-27 | Stop reason: ALTCHOICE

## 2021-03-19 RX ORDER — CARVEDILOL 12.5 MG/1
6.25 TABLET ORAL 2 TIMES DAILY WITH MEALS
Qty: 180 TABLET | Refills: 3 | Status: SHIPPED | OUTPATIENT
Start: 2021-03-19 | End: 2021-07-27 | Stop reason: ALTCHOICE

## 2021-03-19 NOTE — TELEPHONE ENCOUNTER
Call  Reduce coreg to 6.25 mg bid  confirm taking lisinopril 10 mg bid  I sent scripts  Call if BP does not improvee

## 2021-03-19 NOTE — TELEPHONE ENCOUNTER
Spoke with Alberto Lamar, relayed message per [address]. Pt v/u and wrote all instructions down.

## 2021-03-19 NOTE — TELEPHONE ENCOUNTER
Pt calling stating she thinks her BP is getting to low. Per pt last night taken @ several different times form 9 pm to 11 pm ranged lowest of   73/46  72/50  81/54  96/61  107/69    This am BP was 111/74    Pt uses a wrist bp monitor. I did advise to bring it with her @ next ov to check accuracy     Last ov was 2.9.21 VV       Return in about 6 months (around 8/9/2021). Plan:   Smoking cessation discussed   Referral for sleep study   Increase Coreg to 12.5 mg twice a day for better blood pressure control   Increase Lisinopril to 20 mg daily for better blood pressure control   Continue other medications   Check blood pressure at home dailt after once week of increase in medications.  Call with numbers over 140/90  Regular exercise and following a healthy diet encouraged   Follow up with NP in 6 months

## 2021-04-28 RX ORDER — LISINOPRIL 10 MG/1
10 TABLET ORAL 2 TIMES DAILY
Qty: 180 TABLET | Refills: 3 | Status: SHIPPED | OUTPATIENT
Start: 2021-04-28 | End: 2022-07-21

## 2021-05-26 ENCOUNTER — TELEPHONE (OUTPATIENT)
Dept: PULMONOLOGY | Age: 56
End: 2021-05-26

## 2021-05-26 NOTE — TELEPHONE ENCOUNTER
Patient did not show for 31-90 day sleep,PFT and CT follow-up appointment  with  on 5/26/21    Same Day Cancellation: No    Patient rescheduled:  No    New appointment: n/a    Patient was also no show on: n/a    LOV   Assessment: 3/2/21      · Snoring  · Observed sleep apnea   · Hypersomnia and Fatigue   · SOB, cough and wheezes   · Mild emphysema   · Nonischemic cardiomyopathy and hypertension followed by cardiology  · 45 pack year smoking       Plan:       · PSG evaluate for sleep related breathing disorder. · Treatment options were discussed with patient if HST reveals DARELL, including CPAP therapy, oral appliances, upper airway surgery and hypoglossal nerve stimulation. · Discussed with patient the pathophysiology of apnea. · Sleep hygiene  · Avoid sedatives, alcohol and caffeinated drinks at bed time. · No driving motorized vehicles or operating heavy machinery while fatigue, drowsy or sleepy. · Weight loss is also recommended as a long-term intervention. · Complications of DARELL if not treated were discussed with patient patient to include systemic hypertension, pulmonary hypertension, cardiovascular morbidities, car accidents and all cause mortality. · Trial Spiriva Respimat: Two inhalations (5 mcg) once daily (maximum: 2 inhalations per 24 hours)  · PFTs and 6MW   · Albuterol 2 puffs Q4-6 hrs PRN  · D/C Flovent   · CT chest, low dose protocol, screening for lung cancer. Risks, benefits and alternatives including doing nothing were discussed with patient. · Smoking cessation counseling.            Farrukh Turner is a 61 y.o. female being evaluated by a Virtual Visit (video visit) encounter to address concerns as mentioned above.  A caregiver was present when appropriate.  Due to this being a TeleHealth encounter ([facility]), evaluation of the following organ systems was limited: Vitals/Constitutional/EENT/Resp/CV/GI//MS/Neuro/Skin/Heme-Lymph-Imm.  Pursuant to

## 2021-06-29 RX ORDER — TIOTROPIUM BROMIDE INHALATION SPRAY 3.12 UG/1
SPRAY, METERED RESPIRATORY (INHALATION)
Qty: 4 G | Refills: 5 | Status: SHIPPED | OUTPATIENT
Start: 2021-06-29 | End: 2021-12-29 | Stop reason: SDUPTHER

## 2021-07-27 ENCOUNTER — OFFICE VISIT (OUTPATIENT)
Dept: CARDIOLOGY CLINIC | Age: 56
End: 2021-07-27
Payer: COMMERCIAL

## 2021-07-27 VITALS
OXYGEN SATURATION: 94 % | BODY MASS INDEX: 23.9 KG/M2 | HEIGHT: 64 IN | SYSTOLIC BLOOD PRESSURE: 120 MMHG | WEIGHT: 140 LBS | DIASTOLIC BLOOD PRESSURE: 62 MMHG | HEART RATE: 58 BPM

## 2021-07-27 DIAGNOSIS — I10 ESSENTIAL HYPERTENSION: ICD-10-CM

## 2021-07-27 DIAGNOSIS — I50.31 ACUTE DIASTOLIC CONGESTIVE HEART FAILURE (HCC): ICD-10-CM

## 2021-07-27 DIAGNOSIS — I25.10 CORONARY ARTERY DISEASE INVOLVING NATIVE CORONARY ARTERY OF NATIVE HEART WITHOUT ANGINA PECTORIS: Primary | ICD-10-CM

## 2021-07-27 DIAGNOSIS — R07.2 PRECORDIAL PAIN: ICD-10-CM

## 2021-07-27 PROCEDURE — 99214 OFFICE O/P EST MOD 30 MIN: CPT | Performed by: INTERNAL MEDICINE

## 2021-07-27 PROCEDURE — 3017F COLORECTAL CA SCREEN DOC REV: CPT | Performed by: INTERNAL MEDICINE

## 2021-07-27 PROCEDURE — 4004F PT TOBACCO SCREEN RCVD TLK: CPT | Performed by: INTERNAL MEDICINE

## 2021-07-27 PROCEDURE — G8420 CALC BMI NORM PARAMETERS: HCPCS | Performed by: INTERNAL MEDICINE

## 2021-07-27 PROCEDURE — G8427 DOCREV CUR MEDS BY ELIG CLIN: HCPCS | Performed by: INTERNAL MEDICINE

## 2021-07-27 RX ORDER — CARVEDILOL 6.25 MG/1
6.25 TABLET ORAL 2 TIMES DAILY WITH MEALS
Qty: 180 TABLET | Refills: 3 | Status: SHIPPED | OUTPATIENT
Start: 2021-07-27 | End: 2022-02-14

## 2021-07-27 RX ORDER — CARVEDILOL 6.25 MG/1
6.25 TABLET ORAL 2 TIMES DAILY WITH MEALS
COMMUNITY
End: 2021-07-27 | Stop reason: SDUPTHER

## 2021-07-27 RX ORDER — POTASSIUM CHLORIDE 20 MEQ/1
20 TABLET, EXTENDED RELEASE ORAL DAILY
Qty: 90 TABLET | Refills: 3 | Status: SHIPPED | OUTPATIENT
Start: 2021-07-27

## 2021-07-27 RX ORDER — ATORVASTATIN CALCIUM 80 MG/1
80 TABLET, FILM COATED ORAL NIGHTLY
Qty: 90 TABLET | Refills: 3 | Status: SHIPPED | OUTPATIENT
Start: 2021-07-27

## 2021-07-27 NOTE — PATIENT INSTRUCTIONS
Plan:  1. Advised smoking cessation  2. Will check echo  3. Cardiac risk stratification education was reviewed including diet, exercise/activity, medication,and smoking  Cessation [phone]-quit-now  4.  Follow up in 6 months

## 2021-07-27 NOTE — LETTER
[address]   Cardiac Consultation    Referring Provider:  Kori Campbell MD     Chief Complaint   Patient presents with    Follow-up    Congestive Heart Failure    Coronary Artery Disease    Cardiomyopathy    Chest Pain     started Thursday, comes and goes    Edema     hands    Shortness of Breath     with activity        History of Present Illness:   Joann Greene is a 61 y.o. female who is being seen today for cardiomyopathy, sob, chest pain . She has a history of  coronary artery disease- left heart cath on 19 due to non-obstructive coronary artery disease, LVEF 30% by LV gram (echo showed EF at 35%), cough elevated troponin's. Echocardiogram in 10/2019 showed EF has improved to 55-60%. She has a history of alcohol use.               Today she states she has been feeling that her body is holding fluid. She is wheezing. She reports exertional chest pain under her left breast that feels like a gripping squeezing pain. Pain comes and goes lasting few seconds at a time. Associated clammy, lightheaded and SOB. She states pains started this past Thursday. Systolic BP at home 043'Y home weight has been stable. She continues to smoke but has cut back to 1/2 ppd. She is tolerating her medications. She completed 2/2 Moderna covid vaccines    Past Medical History:   has a past medical history of Anxiety, Arthritis, Asthma, CAD (coronary artery disease), Cancer ([address] CHF (congestive heart failure) ([address] COPD (chronic obstructive pulmonary disease) ([address] Cystitis, interstitial, Depression, Diabetes mellitus ([address] DUB (dysfunctional uterine bleeding), GERD (gastroesophageal reflux disease), Hypercholesterolemia, Hypertension, Kidney stones, Low blood potassium, PONV (postoperative nausea and vomiting), and Sleep apnea. Surgical History:   has a past surgical history that includes Appendectomy; Lithotripsy; Ectopic pregnancy surgery; skin biopsy;   section (); Hysterectomy (N/A, 6-11-13); Bladder surgery (2010); Colonoscopy (03/23/2017); Knee arthroscopy (Right, 09/12/2017); and Diagnostic Cardiac Cath Lab Procedure. Social History:   reports that she has been smoking cigarettes. She started smoking about 44 years ago. She has a 40.00 pack-year smoking history. She has never used smokeless tobacco. She reports previous alcohol use. She reports previous drug use. Frequency: 1.00 time per week. Drug: Marijuana. Family History:  family history includes Cancer in her father; Heart Disease in her mother; High Blood Pressure in her father and mother. Home Medications:  Prior to Admission medications    Medication Sig Start Date End Date Taking? Authorizing Provider   carvedilol (COREG) 6.25 MG tablet Take 6.25 mg by mouth 2 times daily (with meals)   Yes Historical Provider, MD   SPIRIVA RESPIMAT 2.5 MCG/ACT AERS inhaler Inhale 2 puffs into the lungs daily 6/29/21  Yes Angleito Villanueva MD   lisinopril (PRINIVIL;ZESTRIL) 10 MG tablet Take 1 tablet by mouth 2 times daily 4/28/21  Yes Chester Chavez MD   albuterol sulfate  (90 Base) MCG/ACT inhaler Inhale 2 puffs into the lungs every 4-6 hours as needed for Wheezing 3/2/21  Yes Angelito Villanueva MD   Blood Pressure KIT Take bp once daily. 2/25/21  Yes Chester Chavez MD   aspirin EC 81 MG EC tablet Take 1 tablet by mouth daily 1/28/21  Yes Chester Chavez MD   furosemide (LASIX) 20 MG tablet Take 1 tablet by mouth See Admin Instructions Daily and as needed if weight gain, increased shortness of breath, increased swelling.  11/3/20  Yes Chester Chavez MD   vitamin B-1 (THIAMINE) 100 MG tablet Take 1 tablet by mouth daily 9/22/20  Yes XIAO Mckeon CNP   potassium chloride (KLOR-CON M) 20 MEQ extended release tablet Take 1 tablet by mouth daily 7/9/20  Yes Chester Chavez MD   atorvastatin (LIPITOR) 80 MG tablet Take 1 tablet by mouth nightly 6/23/20  Yes Chester Chavez MD omeprazole (PRILOSEC) 20 MG delayed release capsule Take 20 mg by mouth daily   Yes Historical Provider, MD   MULTIPLE VITAMIN PO Take by mouth   Yes Historical Provider, MD   acetaminophen (TYLENOL) 500 MG tablet Take 500 mg by mouth every 6 hours as needed for Pain   Yes Historical Provider, MD   FLUoxetine (PROZAC) 20 MG capsule Take 150 mg by mouth daily    Yes Historical Provider, MD   lisinopril (PRINIVIL;ZESTRIL) 20 MG tablet Take 0.5 tablets by mouth 2 times daily Take 10 mg BID  Patient not taking: Reported on 7/27/2021 3/19/21   Chester Chavez MD   carvedilol (COREG) 12.5 MG tablet Take 0.5 tablets by mouth 2 times daily (with meals)  Patient not taking: Reported on 7/27/2021 3/19/21   Chester Chavez MD        Allergies:  Aldactone [spironolactone] and Morphine     Review of Systems:   · Constitutional: there has been no unanticipated weight loss. There's been no change in energy level, sleep pattern, or activity level. · Eyes: No visual changes or diplopia. No scleral icterus. · ENT: No Headaches, hearing loss or vertigo. No mouth sores or sore throat. · Cardiovascular: Reviewed in HPI  · Respiratory: No cough or wheezing, no sputum production. No hematemesis. · Gastrointestinal: No abdominal pain, appetite loss, blood in stools. No change in bowel or bladder habits. · Genitourinary: No dysuria, trouble voiding, or hematuria. · Musculoskeletal:  No gait disturbance, weakness or joint complaints. · Integumentary: No rash or pruritis. · Neurological: No headache, diplopia, change in muscle strength, numbness or tingling. No change in gait, balance, coordination, mood, affect, memory, mentation, behavior. · Psychiatric: No anxiety, no depression. · Endocrine: No malaise, fatigue or temperature intolerance. No excessive thirst, fluid intake, or urination. No tremor.   · Hematologic/Lymphatic: No abnormal bruising or bleeding, blood clots or swollen lymph nodes.  · Allergic/Immunologic: No nasal congestion or hives. Physical Examination:    Vitals:    07/27/21 1031   BP: 120/62   Pulse: 58   SpO2: 94%        Wt Readings from Last 3 Encounters:   07/27/21 140 lb (63.5 kg)   12/28/20 150 lb (68 kg)   09/16/20 150 lb (68 kg)       Constitutional and General Appearance: NAD   Respiratory:  · Normal excursion and expansion without use of accessory muscles  · Resp Auscultation: Scattered wheezing throughout breath sounds without dullness  Cardiovascular:  · The apical impulses not displaced  · Heart tones are crisp and normal  · Cervical veins are not engorged  · The carotid upstroke is normal in amplitude and contour without delay or bruit  · Normal S1S2, No S3, No Murmur  · Peripheral pulses are symmetrical and full  · There is no clubbing, cyanosis of the extremities.   · No edema  · Femoral Arteries: 2+ and equal  · Pedal Pulses: 2+ and equal   Abdomen:  · No masses or tenderness  · Liver/Spleen: No Abnormalities Noted  Neurological/Psychiatric:  · Alert and oriented in all spheres  · Moves all extremities well  · Exhibits normal gait balance and coordination  · No abnormalities of mood, affect, memory, mentation, or behavior are noted    Lab Results   Component Value Date    CHOL 111 10/23/2019    CHOL 162 07/14/2019    CHOL 154 07/13/2019     Lab Results   Component Value Date    TRIG 102 10/23/2019    TRIG 89 07/14/2019    TRIG 68 07/13/2019     Lab Results   Component Value Date    HDL 31 (L) 10/23/2019    HDL 39 (L) 07/14/2019    HDL 52 07/13/2019     Lab Results   Component Value Date    LDLCALC 60 10/23/2019    LDLCALC 105 (H) 07/14/2019    LDLCALC 88 07/13/2019     Lab Results   Component Value Date    LABVLDL 20 10/23/2019    LABVLDL 18 07/14/2019    LABVLDL 14 07/13/2019     No results found for: CHOLHDLRATIO    Left heart cath 7/13/19  Procedures: LHC, LVG, CA, sedation     LM <20%  LAD 30%  Cx <20%  RCA 30%  LVEF 30%  Nonischemic cardiomyopathy        Limited echocardiogram 10/23/19  Summary   Limited only f/u for LVEF.   Normal left ventricular systolic function with ejection fraction of 55-60%.   No regional wall motion abnormalites are seen.   Compared to previous study from 9- no changes noted in left   ventricular function.     EKG 7/15/20  Sinus  Bradycardia   WITHIN NORMAL LIMITS    Assessment:   CAD - nonobstructive  SOB - chronic - due to smoking, pulm dz  Chest pain - more due to lung dz  HTN - controlled  HLD  Stable  H/O nonischemic CMP - EF normal 10/2019. Needs recheck. Attemp to further titrate up meds caused side effects. Plan:  Advised smoking cessation  Echo  Cardiac medications reviewed including indications and pertinent side effects. Dosing reviewed and confirmed as multiple changes since last OV. Continue current. Refills as necessary. Check blood pressure and heart rate at home a few times per week- keep a log with dates and times and bring to office visit   Regular exercise and following a healthy diet encouraged   Follow up in 6 months    Thank you for allowing me to participate in the care of this individual.    This note was scribed in the presence of  Lucila Chairez MD by Deepali Andres RN    The scribes documentation has been prepared under my direction and personally reviewed by me in its entirety. I confirm that the note above accurately reflects all work, treatment, procedures, and medical decision making performed by me. MD Juan Manuel Hubbard.  Myah George M.D., Arleta Eisenmenger

## 2021-07-27 NOTE — PROGRESS NOTES
Hysterectomy (N/A, 6-11-13); Bladder surgery (2010); Colonoscopy (03/23/2017); Knee arthroscopy (Right, 09/12/2017); and Diagnostic Cardiac Cath Lab Procedure. Social History:   reports that she has been smoking cigarettes. She started smoking about 44 years ago. She has a 40.00 pack-year smoking history. She has never used smokeless tobacco. She reports previous alcohol use. She reports previous drug use. Frequency: 1.00 time per week. Drug: Marijuana. Family History:  family history includes Cancer in her father; Heart Disease in her mother; High Blood Pressure in her father and mother. Home Medications:  Prior to Admission medications    Medication Sig Start Date End Date Taking? Authorizing Provider   carvedilol (COREG) 6.25 MG tablet Take 6.25 mg by mouth 2 times daily (with meals)   Yes Historical Provider, MD   SPIRIVA RESPIMAT 2.5 MCG/ACT AERS inhaler Inhale 2 puffs into the lungs daily 6/29/21  Yes López Steen MD   lisinopril (PRINIVIL;ZESTRIL) 10 MG tablet Take 1 tablet by mouth 2 times daily 4/28/21  Yes Trever Kate MD   albuterol sulfate  (90 Base) MCG/ACT inhaler Inhale 2 puffs into the lungs every 4-6 hours as needed for Wheezing 3/2/21  Yes López Steen MD   Blood Pressure KIT Take bp once daily. 2/25/21  Yes Trever Kate MD   aspirin EC 81 MG EC tablet Take 1 tablet by mouth daily 1/28/21  Yes Trever Kate MD   furosemide (LASIX) 20 MG tablet Take 1 tablet by mouth See Admin Instructions Daily and as needed if weight gain, increased shortness of breath, increased swelling.  11/3/20  Yes Trever Kate MD   vitamin B-1 (THIAMINE) 100 MG tablet Take 1 tablet by mouth daily 9/22/20  Yes XIAO Moffett CNP   potassium chloride (KLOR-CON M) 20 MEQ extended release tablet Take 1 tablet by mouth daily 7/9/20  Yes Trever Kate MD   atorvastatin (LIPITOR) 80 MG tablet Take 1 tablet by mouth nightly 6/23/20  Yes Trever Kate MD omeprazole (PRILOSEC) 20 MG delayed release capsule Take 20 mg by mouth daily   Yes Historical Provider, MD   MULTIPLE VITAMIN PO Take by mouth   Yes Historical Provider, MD   acetaminophen (TYLENOL) 500 MG tablet Take 500 mg by mouth every 6 hours as needed for Pain   Yes Historical Provider, MD   FLUoxetine (PROZAC) 20 MG capsule Take 150 mg by mouth daily    Yes Historical Provider, MD   lisinopril (PRINIVIL;ZESTRIL) 20 MG tablet Take 0.5 tablets by mouth 2 times daily Take 10 mg BID  Patient not taking: Reported on 7/27/2021 3/19/21   Mary Watt MD   carvedilol (COREG) 12.5 MG tablet Take 0.5 tablets by mouth 2 times daily (with meals)  Patient not taking: Reported on 7/27/2021 3/19/21   Mary Watt MD        Allergies:  Aldactone [spironolactone] and Morphine     Review of Systems:   · Constitutional: there has been no unanticipated weight loss. There's been no change in energy level, sleep pattern, or activity level. · Eyes: No visual changes or diplopia. No scleral icterus. · ENT: No Headaches, hearing loss or vertigo. No mouth sores or sore throat. · Cardiovascular: Reviewed in HPI  · Respiratory: No cough or wheezing, no sputum production. No hematemesis. · Gastrointestinal: No abdominal pain, appetite loss, blood in stools. No change in bowel or bladder habits. · Genitourinary: No dysuria, trouble voiding, or hematuria. · Musculoskeletal:  No gait disturbance, weakness or joint complaints. · Integumentary: No rash or pruritis. · Neurological: No headache, diplopia, change in muscle strength, numbness or tingling. No change in gait, balance, coordination, mood, affect, memory, mentation, behavior. · Psychiatric: No anxiety, no depression. · Endocrine: No malaise, fatigue or temperature intolerance. No excessive thirst, fluid intake, or urination. No tremor.   · Hematologic/Lymphatic: No abnormal bruising or bleeding, blood clots or swollen lymph nodes.  · Allergic/Immunologic: No nasal congestion or hives. Physical Examination:    Vitals:    07/27/21 1031   BP: 120/62   Pulse: 58   SpO2: 94%        Wt Readings from Last 3 Encounters:   07/27/21 140 lb (63.5 kg)   12/28/20 150 lb (68 kg)   09/16/20 150 lb (68 kg)       Constitutional and General Appearance: NAD   Respiratory:  · Normal excursion and expansion without use of accessory muscles  · Resp Auscultation: Scattered wheezing throughout breath sounds without dullness  Cardiovascular:  · The apical impulses not displaced  · Heart tones are crisp and normal  · Cervical veins are not engorged  · The carotid upstroke is normal in amplitude and contour without delay or bruit  · Normal S1S2, No S3, No Murmur  · Peripheral pulses are symmetrical and full  · There is no clubbing, cyanosis of the extremities.   · No edema  · Femoral Arteries: 2+ and equal  · Pedal Pulses: 2+ and equal   Abdomen:  · No masses or tenderness  · Liver/Spleen: No Abnormalities Noted  Neurological/Psychiatric:  · Alert and oriented in all spheres  · Moves all extremities well  · Exhibits normal gait balance and coordination  · No abnormalities of mood, affect, memory, mentation, or behavior are noted    Lab Results   Component Value Date    CHOL 111 10/23/2019    CHOL 162 07/14/2019    CHOL 154 07/13/2019     Lab Results   Component Value Date    TRIG 102 10/23/2019    TRIG 89 07/14/2019    TRIG 68 07/13/2019     Lab Results   Component Value Date    HDL 31 (L) 10/23/2019    HDL 39 (L) 07/14/2019    HDL 52 07/13/2019     Lab Results   Component Value Date    LDLCALC 60 10/23/2019    LDLCALC 105 (H) 07/14/2019    LDLCALC 88 07/13/2019     Lab Results   Component Value Date    LABVLDL 20 10/23/2019    LABVLDL 18 07/14/2019    LABVLDL 14 07/13/2019     No results found for: CHOLHDLRATIO    Left heart cath 7/13/19  Procedures: LHC, LVG, CA, sedation     LM <20%  LAD 30%  Cx <20%  RCA 30%  LVEF 30%  Nonischemic cardiomyopathy        Limited echocardiogram 10/23/19  Summary   Limited only f/u for LVEF.   Normal left ventricular systolic function with ejection fraction of 55-60%.   No regional wall motion abnormalites are seen.   Compared to previous study from 9- no changes noted in left   ventricular function.     EKG 7/15/20  Sinus  Bradycardia   WITHIN NORMAL LIMITS    Assessment:   CAD - nonobstructive  SOB - chronic - due to smoking, pulm dz  Chest pain - more due to lung dz  HTN - controlled  HLD  Stable  H/O nonischemic CMP - EF normal 10/2019. Needs recheck. Attemp to further titrate up meds caused side effects. Plan:  Advised smoking cessation  Echo  Cardiac medications reviewed including indications and pertinent side effects. Dosing reviewed and confirmed as multiple changes since last OV. Continue current. Refills as necessary. Check blood pressure and heart rate at home a few times per week- keep a log with dates and times and bring to office visit   Regular exercise and following a healthy diet encouraged   Follow up in 6 months    Thank you for allowing me to participate in the care of this individual.    This note was scribed in the presence of  Winter Lew MD by Chan Chisholm RN    The scribes documentation has been prepared under my direction and personally reviewed by me in its entirety. I confirm that the note above accurately reflects all work, treatment, procedures, and medical decision making performed by me. Dr. Winter Lew MD        [facility].  Lucinda Ayers M.D., Alfreda Conklin

## 2021-09-14 RX ORDER — ASPIRIN 81 MG/1
TABLET, COATED ORAL
Qty: 90 TABLET | Refills: 3 | Status: SHIPPED | OUTPATIENT
Start: 2021-09-14 | End: 2022-10-10

## 2021-09-22 ENCOUNTER — TELEPHONE (OUTPATIENT)
Dept: PULMONOLOGY | Age: 56
End: 2021-09-22

## 2021-09-22 NOTE — TELEPHONE ENCOUNTER
Patient did not show for 31-90, CT and PFT follow-up appointment  with  on 9/22/21. Patient did not complete testing. Same Day Cancellation: No    Patient rescheduled:  No    New appointment: n/a    Patient was also no show on: 5/26/21    LOV   Assessment: 3/2/21      · Snoring  · Observed sleep apnea   · Hypersomnia and Fatigue   · SOB, cough and wheezes   · Mild emphysema   · Nonischemic cardiomyopathy and hypertension followed by cardiology  · 45 pack year smoking       Plan:       · PSG evaluate for sleep related breathing disorder. · Treatment options were discussed with patient if HST reveals DARELL, including CPAP therapy, oral appliances, upper airway surgery and hypoglossal nerve stimulation. · Discussed with patient the pathophysiology of apnea. · Sleep hygiene  · Avoid sedatives, alcohol and caffeinated drinks at bed time. · No driving motorized vehicles or operating heavy machinery while fatigue, drowsy or sleepy. · Weight loss is also recommended as a long-term intervention. · Complications of DARELL if not treated were discussed with patient patient to include systemic hypertension, pulmonary hypertension, cardiovascular morbidities, car accidents and all cause mortality. · Trial Spiriva Respimat: Two inhalations (5 mcg) once daily (maximum: 2 inhalations per 24 hours)  · PFTs and 6MW   · Albuterol 2 puffs Q4-6 hrs PRN  · D/C Flovent   · CT chest, low dose protocol, screening for lung cancer. Risks, benefits and alternatives including doing nothing were discussed with patient. · Smoking cessation counseling.            Carmenza Martinez is a 61 y.o. female being evaluated by a Virtual Visit (video visit) encounter to address concerns as mentioned above.  A caregiver was present when appropriate.  Due to this being a TeleHealth encounter (During [facility]- public health emergency), evaluation of the following organ systems was limited: Vitals/Constitutional/EENT/Resp/CV/GI//MS/Neuro/Skin/Heme-Lymph-Imm.  Pursuant to the emergency declaration under the [address] [place] [address] waiver authority and the [employer] Act, this Virtual Visit was conducted with patient's (and/or legal guardian's) consent, to reduce the patient's risk of exposure to COVID-19 and provide necessary medical care.  The patient (and/or legal guardian) has also been advised to contact this office for worsening conditions or problems, and seek emergency medical treatment and/or call 911 if deemed necessary.     Services were provided through a video synchronous discussion virtually to substitute for in-person clinic visit.  Patient was located in her home, provider was located in his office.     --Melba Saba MD on 3/2/2021 at 1:07 PM     An electronic signature was used to authenticate this note.

## 2021-09-23 ENCOUNTER — HOSPITAL ENCOUNTER (OUTPATIENT)
Dept: NON INVASIVE DIAGNOSTICS | Age: 56
Discharge: HOME | End: 2021-09-23
Payer: COMMERCIAL

## 2021-09-23 ENCOUNTER — HOSPITAL ENCOUNTER (OUTPATIENT)
Dept: CT IMAGING | Age: 56
Discharge: HOME | End: 2021-09-23
Payer: COMMERCIAL

## 2021-09-23 DIAGNOSIS — I50.31 ACUTE DIASTOLIC CONGESTIVE HEART FAILURE (HCC): ICD-10-CM

## 2021-09-23 DIAGNOSIS — R07.2 PRECORDIAL PAIN: ICD-10-CM

## 2021-09-23 DIAGNOSIS — Z87.891 PERSONAL HISTORY OF TOBACCO USE, PRESENTING HAZARDS TO HEALTH: ICD-10-CM

## 2021-09-23 LAB
LV EF: 55 %
LVEF MODALITY: NORMAL

## 2021-09-23 PROCEDURE — 71271 CT THORAX LUNG CANCER SCR C-: CPT

## 2021-09-23 PROCEDURE — 93306 TTE W/DOPPLER COMPLETE: CPT

## 2021-09-27 ENCOUNTER — TELEPHONE (OUTPATIENT)
Dept: CARDIOLOGY CLINIC | Age: 56
End: 2021-09-27

## 2021-09-27 NOTE — TELEPHONE ENCOUNTER
----- Message from Jacki Cazares MD sent at 9/24/2021  5:14 PM EDT -----  [address] patient. Please the patient know that her echocardiogram is normal with normal EF.   Has minor valvular regurg that will be followed over the years no change at this time

## 2021-10-07 ENCOUNTER — TELEPHONE (OUTPATIENT)
Dept: PULMONOLOGY | Age: 56
End: 2021-10-07

## 2021-10-07 NOTE — TELEPHONE ENCOUNTER
Patient called cancelled appt for 10/11/21 missed her PFT. PFT susie 10/21/21 appt susie to next available 12/27/21. Will watch for cancellations. 3/2/21    Assessment:       · Snoring  · Observed sleep apnea   · Hypersomnia and Fatigue   · SOB, cough and wheezes   · Mild emphysema   · Nonischemic cardiomyopathy and hypertension followed by cardiology  · 45 pack year smoking       Plan:       · PSG evaluate for sleep related breathing disorder. · Treatment options were discussed with patient if HST reveals DARELL, including CPAP therapy, oral appliances, upper airway surgery and hypoglossal nerve stimulation. · Discussed with patient the pathophysiology of apnea. · Sleep hygiene  · Avoid sedatives, alcohol and caffeinated drinks at bed time. · No driving motorized vehicles or operating heavy machinery while fatigue, drowsy or sleepy. · Weight loss is also recommended as a long-term intervention. · Complications of DARELL if not treated were discussed with patient patient to include systemic hypertension, pulmonary hypertension, cardiovascular morbidities, car accidents and all cause mortality. · Trial Spiriva Respimat: Two inhalations (5 mcg) once daily (maximum: 2 inhalations per 24 hours)  · PFTs and 6MW   · Albuterol 2 puffs Q4-6 hrs PRN  · D/C Flovent   · CT chest, low dose protocol, screening for lung cancer. Risks, benefits and alternatives including doing nothing were discussed with patient.

## 2021-10-20 ENCOUNTER — HOSPITAL ENCOUNTER (OUTPATIENT)
Age: 56
Discharge: HOME | End: 2021-10-20
Payer: COMMERCIAL

## 2021-10-20 LAB — SARS-COV-2, NAAT: NOT DETECTED

## 2021-10-20 PROCEDURE — 87635 SARS-COV-2 COVID-19 AMP PRB: CPT

## 2021-12-21 NOTE — TELEPHONE ENCOUNTER
Pt r/s pft 1/4/22 Left message asking patient to return call to office will need to r/s fua until after

## 2021-12-29 ENCOUNTER — OFFICE VISIT (OUTPATIENT)
Dept: PULMONOLOGY | Age: 56
End: 2021-12-29
Payer: COMMERCIAL

## 2021-12-29 VITALS
BODY MASS INDEX: 23.9 KG/M2 | HEIGHT: 64 IN | OXYGEN SATURATION: 93 % | DIASTOLIC BLOOD PRESSURE: 70 MMHG | HEART RATE: 74 BPM | RESPIRATION RATE: 20 BRPM | WEIGHT: 140 LBS | SYSTOLIC BLOOD PRESSURE: 132 MMHG

## 2021-12-29 DIAGNOSIS — J43.9 ACUTE EXACERBATION OF EMPHYSEMA (HCC): ICD-10-CM

## 2021-12-29 DIAGNOSIS — G47.10 HYPERSOMNIA: ICD-10-CM

## 2021-12-29 DIAGNOSIS — Z87.891 PERSONAL HISTORY OF TOBACCO USE: ICD-10-CM

## 2021-12-29 DIAGNOSIS — G47.30 OBSERVED SLEEP APNEA: Primary | ICD-10-CM

## 2021-12-29 DIAGNOSIS — Z87.891 PERSONAL HISTORY OF TOBACCO USE, PRESENTING HAZARDS TO HEALTH: ICD-10-CM

## 2021-12-29 PROCEDURE — 3017F COLORECTAL CA SCREEN DOC REV: CPT | Performed by: INTERNAL MEDICINE

## 2021-12-29 PROCEDURE — 4004F PT TOBACCO SCREEN RCVD TLK: CPT | Performed by: INTERNAL MEDICINE

## 2021-12-29 PROCEDURE — 99214 OFFICE O/P EST MOD 30 MIN: CPT | Performed by: INTERNAL MEDICINE

## 2021-12-29 PROCEDURE — 3023F SPIROM DOC REV: CPT | Performed by: INTERNAL MEDICINE

## 2021-12-29 PROCEDURE — G8420 CALC BMI NORM PARAMETERS: HCPCS | Performed by: INTERNAL MEDICINE

## 2021-12-29 PROCEDURE — G8427 DOCREV CUR MEDS BY ELIG CLIN: HCPCS | Performed by: INTERNAL MEDICINE

## 2021-12-29 PROCEDURE — G0296 VISIT TO DETERM LDCT ELIG: HCPCS | Performed by: INTERNAL MEDICINE

## 2021-12-29 PROCEDURE — G8482 FLU IMMUNIZE ORDER/ADMIN: HCPCS | Performed by: INTERNAL MEDICINE

## 2021-12-29 RX ORDER — AMOXICILLIN 500 MG/1
CAPSULE ORAL
COMMUNITY
Start: 2021-12-28

## 2021-12-29 RX ORDER — PREDNISONE 10 MG/1
TABLET ORAL
COMMUNITY
Start: 2021-12-28

## 2021-12-29 RX ORDER — ALBUTEROL SULFATE 90 UG/1
2 AEROSOL, METERED RESPIRATORY (INHALATION)
Qty: 18 G | Refills: 5 | Status: SHIPPED | OUTPATIENT
Start: 2021-12-29

## 2021-12-29 ASSESSMENT — SLEEP AND FATIGUE QUESTIONNAIRES
HOW LIKELY ARE YOU TO NOD OFF OR FALL ASLEEP WHILE WATCHING TV: 1
HOW LIKELY ARE YOU TO NOD OFF OR FALL ASLEEP IN A CAR, WHILE STOPPED FOR A FEW MINUTES IN TRAFFIC: 0
HOW LIKELY ARE YOU TO NOD OFF OR FALL ASLEEP WHILE SITTING AND READING: 2
ESS TOTAL SCORE: 10
HOW LIKELY ARE YOU TO NOD OFF OR FALL ASLEEP WHILE SITTING QUIETLY AFTER LUNCH WITHOUT ALCOHOL: 1
HOW LIKELY ARE YOU TO NOD OFF OR FALL ASLEEP WHILE SITTING INACTIVE IN A PUBLIC PLACE: 1
HOW LIKELY ARE YOU TO NOD OFF OR FALL ASLEEP WHILE LYING DOWN TO REST IN THE AFTERNOON WHEN CIRCUMSTANCES PERMIT: 3
NECK CIRCUMFERENCE (INCHES): 13
HOW LIKELY ARE YOU TO NOD OFF OR FALL ASLEEP WHILE SITTING AND TALKING TO SOMEONE: 1
HOW LIKELY ARE YOU TO NOD OFF OR FALL ASLEEP WHEN YOU ARE A PASSENGER IN A CAR FOR AN HOUR WITHOUT A BREAK: 1

## 2021-12-29 NOTE — PROGRESS NOTES
[facility]                                                           CHIEF COMPLAINT: Follow up tests results       HPI:   Patient has not had the PFTs or the PSG  Not doing well for 2 days  SOB cough and wheezes  No fever  No recent covid exposure   Started on Abx and steroid per PCP   Uses Albuterol past 2 days 3 times/day   Smoking still 1/2 ppd       Past Medical History:   Diagnosis Date    Anxiety     Arthritis     Asthma     CAD (coronary artery disease)     Cancer ([facility]     skin \"precancerous\"    CHF (congestive heart failure) ([facility]     COPD (chronic obstructive pulmonary disease) ([facility]     Cystitis, interstitial     Depression     Diabetes mellitus ([facility]     borderline    DUB (dysfunctional uterine bleeding) 3/2013    GERD (gastroesophageal reflux disease)     Hypercholesterolemia     no meds currently    Hypertension     Kidney stones     Low blood potassium     PONV (postoperative nausea and vomiting)     Sleep apnea     never fitted for an appliance       Past Surgical History:        Procedure Laterality Date    APPENDECTOMY      BLADDER SURGERY  2010    X 3     SECTION      COLONOSCOPY  2017    Polyps    DIAGNOSTIC CARDIAC CATH LAB PROCEDURE      ECTOPIC PREGNANCY SURGERY      HYSTERECTOMY N/A 13    ROBOTIC ASSISTED TOTAL LAPAROSCOPIC HYSTERECTOMY    KNEE ARTHROSCOPY Right 2017    LITHOTRIPSY      X 3    SKIN BIOPSY         Allergies:  is allergic to aldactone [spironolactone] and morphine. Social History:    TOBACCO:   reports that she has been smoking cigarettes. She started smoking about 45 years ago. She has a 40.00 pack-year smoking history. She has never used smokeless tobacco.  ETOH:   reports previous alcohol use.       Family History:       Problem Relation Age of Onset    High Blood Pressure Mother     Heart Disease Mother     Cancer Father         lung    High Blood Pressure Father        Current Medications:    Current Outpatient Medications:     ASPIRIN LOW DOSE 81 MG EC tablet, Take 1 tablet by mouth daily, Disp: 90 tablet, Rfl: 3    atorvastatin (LIPITOR) 80 MG tablet, Take 1 tablet by mouth nightly, Disp: 90 tablet, Rfl: 3    potassium chloride (KLOR-CON M) 20 MEQ extended release tablet, Take 1 tablet by mouth daily, Disp: 90 tablet, Rfl: 3    carvedilol (COREG) 6.25 MG tablet, Take 1 tablet by mouth 2 times daily (with meals), Disp: 180 tablet, Rfl: 3    SPIRIVA RESPIMAT 2.5 MCG/ACT AERS inhaler, Inhale 2 puffs into the lungs daily, Disp: 4 g, Rfl: 5    lisinopril (PRINIVIL;ZESTRIL) 10 MG tablet, Take 1 tablet by mouth 2 times daily, Disp: 180 tablet, Rfl: 3    albuterol sulfate  (90 Base) MCG/ACT inhaler, Inhale 2 puffs into the lungs every 4-6 hours as needed for Wheezing, Disp: 1 Inhaler, Rfl: 5    Blood Pressure KIT, Take bp once daily. , Disp: 1 kit, Rfl: 0    furosemide (LASIX) 20 MG tablet, Take 1 tablet by mouth See Admin Instructions Daily and as needed if weight gain, increased shortness of breath, increased swelling., Disp: 180 tablet, Rfl: 1    vitamin B-1 (THIAMINE) 100 MG tablet, Take 1 tablet by mouth daily, Disp: 30 tablet, Rfl: 3    omeprazole (PRILOSEC) 20 MG delayed release capsule, Take 20 mg by mouth daily, Disp: , Rfl:     MULTIPLE VITAMIN PO, Take by mouth, Disp: , Rfl:     acetaminophen (TYLENOL) 500 MG tablet, Take 500 mg by mouth every 6 hours as needed for Pain, Disp: , Rfl:     FLUoxetine (PROZAC) 20 MG capsule, Take 150 mg by mouth daily , Disp: , Rfl:     amoxicillin (AMOXIL) 500 MG capsule, , Disp: , Rfl:     predniSONE (DELTASONE) 10 MG tablet, , Disp: , Rfl:           Objective:   PHYSICAL EXAM:    Blood pressure 132/70, pulse 74, resp. rate 20, height 5' 4\" (1.626 m), weight 140 lb (63.5 kg), last menstrual period 05/29/2013, SpO2 93 %, not currently breastfeeding.' on RA  Gen: No distress.   BMI of 24.03  Eyes: PERRL. No sclera icterus. No conjunctival injection. ENT: No discharge. Pharynx clear. Mallampati class IV. Neck: Trachea midline. No obvious mass. Neck circumference 10\"  Resp: No accessory muscle use. No crackles. Bilateral wheezes. No rhonchi. No dullness on percussion. Good air entry. CV: Regular rate. Regular rhythm. No murmur or rub. No edema. GI: Non-tender. Non-distended. No hernia. Skin: Warm and dry. No nodule on exposed extremities. Lymph: No cervical LAD. No supraclavicular LAD. M/S: No cyanosis. No joint deformity. No clubbing. Neuro: Awake. Alert. Moves all four extremities. Psych: Oriented x 3. No anxiety. DATA reviewed by me:   CT chest 10/22/2019 imaging reviewed by me and showed  No PE  No acute cardiopulmonary disease    CT chest 9/23/2021 imaging reviewed by me and showed  Mild emphysema  Stable tiny pulmonary nodules  Mild to moderate coronary artery calcifications        Assessment:       · Observed sleep apnea and hypersomnia  · SOB, cough and wheezes - likely COPD with AE   · Mild emphysema with acute exacerbation  · Pulmonary nodules-stable on repeat CT 9/23/2021. · Nonischemic cardiomyopathy and hypertension followed by cardiology  · 45 pack year smoking       Plan:        PSG evaluate for sleep related breathing disorder.  Advised to reschedule PFTts in 6 weeks    Continue Spiriva and albuterol 2 puffs Q4-6 hrs PRN   Advised to start steroid and Abx prescribed by PCP ASAP   ER if not better    CT chest, low dose protocol, screening for lung cancer September 2022. Risks, benefits and alternatives including doing nothing were discussed with patient.    Smoking cessation counseling   Follow up after CT chest

## 2021-12-29 NOTE — PROGRESS NOTES
Low Dose CT (LDCT) Lung Screening criteria met:     Age 55-77(Medicare) or 50-80 ([facility])   Pack year smoking >30 (Medicare) or >20 ([facility])   Still smoking or less than 15 year since quit   No sign or symptoms of lung cancer   > 11 months since last LDCT     Risks and benefits of lung cancer screening with LDCT scans discussed:    Significance of positive screen - False-positive LDCT results often occur. 95% of all positive results do not lead to a diagnosis of cancer. Usually further imaging can resolve most false-positive results; however, some patients may require invasive procedures. Over diagnosis risk - 10% to 12% of screen-detected lung cancer cases are over diagnosed--that is, the cancer would not have been detected in the patient's lifetime without the screening. Need for follow up screens annually to continue lung cancer screening effectiveness     Risks associated with radiation from annual LDCT- Radiation exposure is about the same as for a mammogram, which is about 1/3 of the annual background radiation exposure from everyday life. Starting screening at age 54 is not likely to increase cancer risk from radiation exposure. Patients with comorbidities resulting in life expectancy of < 10 years, or that would preclude treatment of an abnormality identified on CT, should not be screened due to lack of benefit.     To obtain maximal benefit from this screening, smoking cessation and long-term abstinence from smoking is critical

## 2021-12-30 ENCOUNTER — TELEPHONE (OUTPATIENT)
Dept: PULMONOLOGY | Age: 56
End: 2021-12-30

## 2021-12-30 DIAGNOSIS — J43.9 PULMONARY EMPHYSEMA, UNSPECIFIED EMPHYSEMA TYPE (HCC): Primary | ICD-10-CM

## 2021-12-30 NOTE — TELEPHONE ENCOUNTER
Cancelled PFT for 1/4/22 due to PFT lab closing. L/M informing pt. Will watch for PFT lab to re-open to r/s.

## 2022-01-12 ENCOUNTER — HOSPITAL ENCOUNTER (OUTPATIENT)
Dept: GENERAL RADIOLOGY | Age: 57
Discharge: HOME | End: 2022-01-12
Payer: COMMERCIAL

## 2022-01-12 ENCOUNTER — HOSPITAL ENCOUNTER (OUTPATIENT)
Age: 57
Discharge: HOME | End: 2022-01-12
Payer: COMMERCIAL

## 2022-01-12 DIAGNOSIS — B88.0 OTHER ACARIASIS: ICD-10-CM

## 2022-01-12 PROCEDURE — 73560 X-RAY EXAM OF KNEE 1 OR 2: CPT

## 2022-02-14 RX ORDER — CARVEDILOL 6.25 MG/1
6.25 TABLET ORAL 2 TIMES DAILY WITH MEALS
Qty: 180 TABLET | Refills: 0 | Status: SHIPPED | OUTPATIENT
Start: 2022-02-14 | End: 2022-08-22

## 2022-02-21 ENCOUNTER — HOSPITAL ENCOUNTER (OUTPATIENT)
Dept: PHYSICAL THERAPY | Age: 57
Setting detail: THERAPIES SERIES
Discharge: HOME | End: 2022-02-21

## 2022-02-21 NOTE — FLOWSHEET NOTE
[place] [id number], [facility])    Physical Therapy  Cancellation/No-show Note  Patient Name:  Harmony Cruz  :  1965   Date:  2022    Cancelled visits to date: 1  No-shows to date: 0    For today's appointment patient:  [x]  Cancelled  []  Rescheduled appointment  []  No-show     Reason given by patient:  [x]  Patient ill  []  Conflicting appointment  []  No transportation    []  Conflict with work  []  No reason given  []  Other:     Comments:      Phone call information:   []  Phone call made today to patient. []  Patient answered, conversation as follows:    []  Patient did not answer. [x]  Phone call not needed - pt contacted us to cancel and provided reason for cancellation.      Electronically signed by:  Iftikhar Lim, PT, MPT, ATC, cert DN

## 2022-03-15 ENCOUNTER — TELEPHONE (OUTPATIENT)
Dept: CARDIOLOGY CLINIC | Age: 57
End: 2022-03-15

## 2022-03-15 NOTE — TELEPHONE ENCOUNTER
Recommend hold ASA 7 days prior to teeth pulled  If has not done this, may want to reschedule for next week  Would defer to the provider performing the procedure

## 2022-03-15 NOTE — TELEPHONE ENCOUNTER
Pt stated that she is having 5 teeth pulled tomorrow at 1:00. Pt stated that she is nervous about it due to bleeding. Pt wanted to check with [facility] if it was ok to proceed with the procedure. Please advise.

## 2022-03-15 NOTE — TELEPHONE ENCOUNTER
Spoke with Mala Mandujano relayed message per [address]. Pt v/u. She will contact dentist, if she needs a letter she will call with a good fax number.

## 2022-04-14 NOTE — TELEPHONE ENCOUNTER
The patient's cardiac condition is not prohibitory to undergo surgery. The patient's cardiac risk is moderate based upon my evaluation and testing. Stable to proceed.

## 2022-04-14 NOTE — TELEPHONE ENCOUNTER
[facility] Patient last seen 7/27/2021. Patient has a history of nonobstructive CAD and nonischemic cardiomyopathy.

## 2022-07-21 ENCOUNTER — TELEPHONE (OUTPATIENT)
Dept: CARDIOLOGY CLINIC | Age: 57
End: 2022-07-21

## 2022-07-21 RX ORDER — LISINOPRIL 10 MG/1
10 TABLET ORAL 2 TIMES DAILY
Qty: 180 TABLET | Refills: 0 | Status: SHIPPED | OUTPATIENT
Start: 2022-07-21

## 2022-07-21 NOTE — TELEPHONE ENCOUNTER
Please contact pt for yearly appt/med refills appt [facility]. If the office can not get a hold of the patient for an appt please mail a letter to have them call and schedule. Thanks.

## 2022-07-21 NOTE — TELEPHONE ENCOUNTER
Spoke with pt and she preferred [facility], pt is scheduled for first available for 9/27/22 at [facility].

## 2022-08-03 DIAGNOSIS — J43.9 ACUTE EXACERBATION OF EMPHYSEMA (HCC): ICD-10-CM

## 2022-08-22 RX ORDER — CARVEDILOL 6.25 MG/1
6.25 TABLET ORAL 2 TIMES DAILY WITH MEALS
Qty: 180 TABLET | Refills: 0 | Status: SHIPPED | OUTPATIENT
Start: 2022-08-22

## 2022-08-22 NOTE — TELEPHONE ENCOUNTER
Pt stated that she needs refill of Carvedilol 6.25mg. Preferred pharmacy is [facility] [phone]. Last ov 07/27/2021 [facility]. Upcoming ov 09/07/2022 [facility]. Pt is out of medication.

## 2022-10-03 ENCOUNTER — TELEPHONE (OUTPATIENT)
Dept: PULMONOLOGY | Age: 57
End: 2022-10-03

## 2022-10-03 NOTE — TELEPHONE ENCOUNTER
Spoke with pt - she also no showed her CT Lung screen that was sched 9/24. She's only had 2 no shows in the last year so her appts were rescheduled. CT sched 10/9 @ [facility] AND [facility] [place]. [place] & f/u OV with Dr. Caprice Hagan sched 10/10 @ 10:15. Pt apologized for missing appt. Said she was unaware they were scheduled.

## 2022-10-03 NOTE — TELEPHONE ENCOUNTER
Patient did not show for 1yr ct  appointment  with Dr. Regine Samson on 10/3/22    Same Day Cancellation: No    Patient rescheduled:  No    New appointment:     Patient was also no show on: 9/22/21, 05/26/21    LOV       Assessment: 12/29/21      Observed sleep apnea and hypersomnia  SOB, cough and wheezes - likely COPD with AE   Mild emphysema with acute exacerbation  Pulmonary nodules-stable on repeat CT 9/23/2021. Nonischemic cardiomyopathy and hypertension followed by cardiology  45 pack year smoking       Plan:       PSG evaluate for sleep related breathing disorder. Advised to reschedule PFTts in 6 weeks   Continue Spiriva and albuterol 2 puffs Q4-6 hrs PRN  Advised to start steroid and Abx prescribed by PCP ASAP  ER if not better   CT chest, low dose protocol, screening for lung cancer September 2022. Risks, benefits and alternatives including doing nothing were discussed with patient.   Smoking cessation counseling  Follow up after CT chest

## 2022-10-09 ENCOUNTER — HOSPITAL ENCOUNTER (OUTPATIENT)
Dept: CT IMAGING | Age: 57
Discharge: HOME | End: 2022-10-09
Payer: COMMERCIAL

## 2022-10-09 DIAGNOSIS — Z87.891 PERSONAL HISTORY OF TOBACCO USE, PRESENTING HAZARDS TO HEALTH: ICD-10-CM

## 2022-10-09 PROCEDURE — 71271 CT THORAX LUNG CANCER SCR C-: CPT

## 2022-10-10 ENCOUNTER — TELEPHONE (OUTPATIENT)
Dept: PULMONOLOGY | Age: 57
End: 2022-10-10

## 2022-10-10 ENCOUNTER — TELEPHONE (OUTPATIENT)
Dept: CARDIOLOGY CLINIC | Age: 57
End: 2022-10-10

## 2022-10-10 RX ORDER — ASPIRIN 81 MG/1
TABLET, COATED ORAL
Qty: 90 TABLET | Refills: 0 | Status: SHIPPED | OUTPATIENT
Start: 2022-10-10

## 2022-10-10 NOTE — TELEPHONE ENCOUNTER
Patient did not show for 1yr ct fu appointment  with Dr. Frederick Daniels on 10/10/22    Same Day Cancellation: No    Patient rescheduled:  No    New appointment:     Patient was also no show on: 10/03/22, 9/22/21, 5/26/21    LOV  Assessment: 12/29/21      Observed sleep apnea and hypersomnia  SOB, cough and wheezes - likely COPD with AE   Mild emphysema with acute exacerbation  Pulmonary nodules-stable on repeat CT 9/23/2021. Nonischemic cardiomyopathy and hypertension followed by cardiology  45 pack year smoking       Plan:       PSG evaluate for sleep related breathing disorder. Advised to reschedule PFTts in 6 weeks   Continue Spiriva and albuterol 2 puffs Q4-6 hrs PRN  Advised to start steroid and Abx prescribed by PCP ASAP  ER if not better   CT chest, low dose protocol, screening for lung cancer September 2022. Risks, benefits and alternatives including doing nothing were discussed with patient.   Smoking cessation counseling  Follow up after CT chest

## 2022-10-10 NOTE — TELEPHONE ENCOUNTER
10/10/22-Called phone number [phone], no answer, lm for pt to return call to [facility] to schedule an annual follow up with [facility].

## 2022-10-10 NOTE — TELEPHONE ENCOUNTER
[facility] Note    PCP: Beny Fay MD    Chief Complaint: BPH    SUBJECTIVE:       History of Present Illness:  Bernardo Oliveira is a 86 y.o. male who presents to clinic for BPH. He is Established  to our clinic.     Previous patient of Dr. Mc, last seen in 2021.  Hx of TURP in . Doing well following this with no complaints.   He is on no prostate medications.   No hematuria or dysuria.   Nocturia x 3 - not bothersome.     PSA 21: 2.4    Last urine culture: no documented UTIs    Lab Results   Component Value Date    CREATININE 1.0 2022     Family  hx: no malignancy   Hx of HLD, HTN, GERD    Past medical, family, and social history reviewed as documented in chart with pertinent positive medical, family, and social history detailed in HPI.    Review of patient's allergies indicates:   Allergen Reactions    Codeine        Past Medical History:   Diagnosis Date    Anxiety     Asbestos-induced pleural plaque 2020    Depression     GERD (gastroesophageal reflux disease)     Hyperlipidemia     Hypertension     Vocal cord cancer      Past Surgical History:   Procedure Laterality Date    CATARACT EXTRACTION, BILATERAL      CHOLECYSTECTOMY      CIRCUMCISION      INGUINAL HERNIA REPAIR Right 2000    INGUINAL HERNIA REPAIR Left     RETINAL DETACHMENT SURGERY      THROAT SURGERY      X 2 for vocal cord cancer    TONSILLECTOMY      TRANSURETHRAL RESECTION OF PROSTATE      dr cindi ryan - [facility]     No family history on file.  Social History     Tobacco Use    Smoking status: Former     Packs/day: 1.50     Types: Cigarettes     Quit date: 1982     Years since quittin.4    Smokeless tobacco: Never   Substance Use Topics    Alcohol use: Not Currently    Drug use: No        Review of Systems   Genitourinary:  Positive for nocturia. Negative for difficulty urinating, dysuria, frequency, hematuria and urgency.     OBJECTIVE:     Anticoagulation:   Patient informed that she could wait to get the PFT done until restrictions are lifted. "no    Estimated body mass index is 24.02 kg/m² as calculated from the following:    Height as of this encounter: 5' 8" (1.727 m).    Weight as of this encounter: 71.7 kg (158 lb).    Vital Signs (Most Recent)  Pulse: 73 (10/11/22 0904)  Resp: 18 (10/11/22 0904)  BP: (!) 175/77 (10/11/22 0904)    Physical Exam  Constitutional:       General: He is not in acute distress.     Appearance: Normal appearance. He is not ill-appearing.   HENT:      Head: Normocephalic and atraumatic.   Eyes:      General: No scleral icterus.  Cardiovascular:      Rate and Rhythm: Normal rate and regular rhythm.   Pulmonary:      Effort: Pulmonary effort is normal. No respiratory distress.   Abdominal:      General: There is no distension.      Palpations: Abdomen is soft.   Genitourinary:     Comments: MARIO: prostate 30 g, benign, no nodules  Skin:     Coloration: Skin is not jaundiced.   Neurological:      General: No focal deficit present.      Mental Status: He is alert and oriented to person, place, and time.   Psychiatric:         Mood and Affect: Mood normal.         Behavior: Behavior normal.         Thought Content: Thought content normal.       BMP  Lab Results   Component Value Date     08/26/2022    K 3.8 08/26/2022     08/26/2022    CO2 29 08/26/2022    BUN 14 08/26/2022    CREATININE 1.0 08/26/2022    CALCIUM 8.8 08/26/2022    ANIONGAP 7 (L) 08/26/2022    ESTGFRAFRICA >60.0 10/15/2021    EGFRNONAA 54.4 (A) 10/15/2021       Lab Results   Component Value Date    WBC 5.63 08/26/2022    HGB 14.0 08/26/2022    HCT 43.1 08/26/2022    MCV 89 08/26/2022     08/26/2022       Imaging:  No pertinent recent imaging available.    ASSESSMENT     1. Benign prostatic hyperplasia without lower urinary tract symptoms    2. Essential hypertension    3. Mixed dyslipidemia    4. Gastroesophageal reflux disease without esophagitis        PLAN:     - Doing very well  - No bothersome symptoms   - MARIO is normal  - Discussed no further " need for PSA screening as he is 87 yo and above the recommended screening age   - Follow up in 1 year or sooner if needed       I spent 30 minutes with the patient. Over 50% of the visit was spent in counseling.      Nhung Kinney MD

## 2022-10-10 NOTE — TELEPHONE ENCOUNTER
Please contact pt for yearly appt/med refills appt [facility]. If the office can not get a hold of the patient for an appt please mail a letter to have them call and schedule. Thanks.

## 2022-10-12 NOTE — TELEPHONE ENCOUNTER
10/12 Called [phone] unable to make contact, GARCÍA. A letter has been mailed to listed address on file. Just wanted to make the MA & RN staff aware that we have made several attempts to contact.

## 2022-10-19 NOTE — TELEPHONE ENCOUNTER
Called [facility] for transport back to [facility].    I spoke with Lanette Wang in PFT lab who confirmed pt cx her PFT that was sched 10/18 due to being sick. I LM for Jose R Johnson today asking her to [facility] so we can resched PFT - hopefully to a day/time prior to her OV with Dr. Francisca Thornton on 11/23.

## 2022-11-10 ENCOUNTER — OFFICE VISIT (OUTPATIENT)
Dept: CARDIOLOGY CLINIC | Age: 57
End: 2022-11-10
Payer: COMMERCIAL

## 2022-11-10 VITALS
WEIGHT: 134 LBS | SYSTOLIC BLOOD PRESSURE: 114 MMHG | HEIGHT: 64 IN | DIASTOLIC BLOOD PRESSURE: 68 MMHG | OXYGEN SATURATION: 95 % | HEART RATE: 78 BPM | BODY MASS INDEX: 22.88 KG/M2

## 2022-11-10 DIAGNOSIS — R06.02 SOB (SHORTNESS OF BREATH): ICD-10-CM

## 2022-11-10 DIAGNOSIS — I25.10 CORONARY ARTERY DISEASE INVOLVING NATIVE CORONARY ARTERY OF NATIVE HEART WITHOUT ANGINA PECTORIS: ICD-10-CM

## 2022-11-10 DIAGNOSIS — I50.31 ACUTE DIASTOLIC CONGESTIVE HEART FAILURE (HCC): ICD-10-CM

## 2022-11-10 DIAGNOSIS — R07.9 CHEST PAIN, UNSPECIFIED TYPE: Primary | ICD-10-CM

## 2022-11-10 PROCEDURE — 4004F PT TOBACCO SCREEN RCVD TLK: CPT | Performed by: INTERNAL MEDICINE

## 2022-11-10 PROCEDURE — G8420 CALC BMI NORM PARAMETERS: HCPCS | Performed by: INTERNAL MEDICINE

## 2022-11-10 PROCEDURE — 93000 ELECTROCARDIOGRAM COMPLETE: CPT | Performed by: INTERNAL MEDICINE

## 2022-11-10 PROCEDURE — 99214 OFFICE O/P EST MOD 30 MIN: CPT | Performed by: INTERNAL MEDICINE

## 2022-11-10 PROCEDURE — 3078F DIAST BP <80 MM HG: CPT | Performed by: INTERNAL MEDICINE

## 2022-11-10 PROCEDURE — G8484 FLU IMMUNIZE NO ADMIN: HCPCS | Performed by: INTERNAL MEDICINE

## 2022-11-10 PROCEDURE — 3074F SYST BP LT 130 MM HG: CPT | Performed by: INTERNAL MEDICINE

## 2022-11-10 PROCEDURE — G8427 DOCREV CUR MEDS BY ELIG CLIN: HCPCS | Performed by: INTERNAL MEDICINE

## 2022-11-10 PROCEDURE — 3017F COLORECTAL CA SCREEN DOC REV: CPT | Performed by: INTERNAL MEDICINE

## 2022-11-10 NOTE — PATIENT INSTRUCTIONS
Plan:  Recommend a stress echocardiogram   Cardiac medications reviewed including indications and pertinent side effects. Medication list updated at this visit. Check blood pressure and heart rate at home a few times per week- keep a log with dates and times and bring to office visit   Regular exercise and following a healthy diet encouraged   Follow up with me in 6 months   Will obtain labs from Elizabeth Lawrence MD     Your provider has ordered testing for further evaluation. An order/prescription has been included in your paper work. To schedule outpatient testing, contact Central Scheduling by calling [address] [place] [address] ([phone]).

## 2022-11-10 NOTE — PROGRESS NOTES
[facility]   Cardiac Consultation    Referring Provider:  Aide Alcala MD     Chief Complaint   Patient presents with    Follow-up     Hot flashes makes he heart rate go up some. Congestive Heart Failure    Cardiomyopathy    Hypertension    Chest Pain     Occ cp, cp varies on stress level. Shortness of Breath     With stairs. Fatigue        Richard Hinson  1965      History of Present Illness:   Richard Hinson is a 61 y.o. female who is here today for follow up for a past medical history of coronary artery disease, nonischemic cardiomyopathy, hypertension, hyperlipidemia, and shortness of breath. Patient had a left heart cath 2019 which showed nonobstructive coronary artery disease, EF 30%, Echocardiogram in 10/2019 showed EF has improved to 55-60%. She has a history of alcohol use. Today she states she has been feeling well since her last visit. States her breathing is about the same as last visit. Some SOB with activity like stairs. States she has left side chest pain which has decreased She states she has been having episodes of hot flashes where she breaks out into a sweat all over. 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 She is tolerating her medications and is taking them as prescribed. Blood pressure has been well controlled. Patient currently denies any weight gain, edema, palpitations, dizziness, and syncope. Past Medical History:   has a past medical history of Anxiety, Arthritis, Asthma, CAD (coronary artery disease), Cancer ([address] CHF (congestive heart failure) ([address] COPD (chronic obstructive pulmonary disease) ([address] Cystitis, interstitial, Depression, Diabetes mellitus ([address] DUB (dysfunctional uterine bleeding), GERD (gastroesophageal reflux disease), Hypercholesterolemia, Hypertension, Kidney stones, Low blood potassium, PONV (postoperative nausea and vomiting), and Sleep apnea.     Surgical History: has a past surgical history that includes Appendectomy; Lithotripsy; Ectopic pregnancy surgery; skin biopsy;  section (); Hysterectomy (N/A, 13); Bladder surgery (); Colonoscopy (2017); Knee arthroscopy (Right, 2017); and Diagnostic Cardiac Cath Lab Procedure. Social History:   reports that she has been smoking cigarettes. She started smoking about 45 years ago. She has a 40.00 pack-year smoking history. She has never used smokeless tobacco. She reports that she does not currently use alcohol. She reports that she does not currently use drugs after having used the following drugs: Marijuana [place] [employer]). Frequency: 1.00 time per week. Family History:  family history includes Cancer in her father; Heart Disease in her mother; High Blood Pressure in her father and mother. Home Medications:  Prior to Admission medications    Medication Sig Start Date End Date Taking? Authorizing Provider   ASPIRIN LOW DOSE 81 MG EC tablet Take 1 tablet by mouth daily 10/10/22  Yes Helen Clark MD   carvedilol (COREG) 6.25 MG tablet Take 1 tablet by mouth 2 times daily (with meals) 22  Yes Shiva Henley MD   tiotropium (SPIRIVA RESPIMAT) 2.5 MCG/ACT AERS inhaler Inhale 2 puffs into the lungs daily 8/3/22  Yes Jose Cruz Saeed MD   lisinopril (PRINIVIL;ZESTRIL) 10 MG tablet Take 1 tablet by mouth 2 times daily  Patient taking differently: Take 10 mg by mouth daily 22  Yes Shiva Henley MD   albuterol sulfate  (90 Base) MCG/ACT inhaler Inhale 2 puffs into the lungs every 4-6 hours as needed for Wheezing 21  Yes Jose Cruz Saeed MD   atorvastatin (LIPITOR) 80 MG tablet Take 1 tablet by mouth nightly 21  Yes Shiva Henley MD   potassium chloride (KLOR-CON M) 20 MEQ extended release tablet Take 1 tablet by mouth daily 21  Yes Shiva Henley MD   Blood Pressure KIT Take bp once daily.  21  Yes Shiva Henley MD   furosemide (LASIX) 20 MG tablet Take 1 tablet by mouth See Admin Instructions Daily and as needed if weight gain, increased shortness of breath, increased swelling. 11/3/20  Yes Rey Haq MD   omeprazole (PRILOSEC) 20 MG delayed release capsule Take 20 mg by mouth daily   Yes Historical Provider, MD   acetaminophen (TYLENOL) 500 MG tablet Take 500 mg by mouth every 6 hours as needed for Pain   Yes Historical Provider, MD   FLUoxetine (PROZAC) 20 MG capsule Take 150 mg by mouth daily    Yes Historical Provider, MD   amoxicillin (AMOXIL) 500 MG capsule  12/28/21   Historical Provider, MD   predniSONE (DELTASONE) 10 MG tablet  12/28/21   Historical Provider, MD   vitamin B-1 (THIAMINE) 100 MG tablet Take 1 tablet by mouth daily  Patient not taking: Reported on 11/10/2022 9/22/20   Matias Palomo APRN - CNP   MULTIPLE VITAMIN PO Take by mouth  Patient not taking: Reported on 11/10/2022    Historical Provider, MD        Allergies:  Aldactone [spironolactone] and Morphine     Review of Systems:   Constitutional: there has been no unanticipated weight loss. There's been no change in energy level, sleep pattern, or activity level. Eyes: No visual changes or diplopia. No scleral icterus. ENT: No Headaches, hearing loss or vertigo. No mouth sores or sore throat. Cardiovascular: Reviewed in HPI  Respiratory: No cough or wheezing, no sputum production. No hematemesis. Gastrointestinal: No abdominal pain, appetite loss, blood in stools. No change in bowel or bladder habits. Genitourinary: No dysuria, trouble voiding, or hematuria. Musculoskeletal:  No gait disturbance, weakness or joint complaints. Integumentary: No rash or pruritis. Neurological: No headache, diplopia, change in muscle strength, numbness or tingling. No change in gait, balance, coordination, mood, affect, memory, mentation, behavior. Psychiatric: No anxiety, no depression. Endocrine: No malaise, fatigue or temperature intolerance.  No excessive thirst, fluid intake, or urination. No tremor. Hematologic/Lymphatic: No abnormal bruising or bleeding, blood clots or swollen lymph nodes. Allergic/Immunologic: No nasal congestion or hives. Physical Examination:    Vitals:    11/10/22 1318   BP: 114/68   Pulse: 78   SpO2: 95%          Wt Readings from Last 3 Encounters:   11/10/22 134 lb (60.8 kg)   12/29/21 140 lb (63.5 kg)   07/27/21 140 lb (63.5 kg)       Constitutional and General Appearance: NAD   Respiratory:  Normal excursion and expansion without use of accessory muscles  Resp Auscultation: Scattered wheezing throughout breath sounds without dullness  Cardiovascular: The apical impulses not displaced  Heart tones are crisp and normal  Cervical veins are not engorged  The carotid upstroke is normal in amplitude and contour without delay or bruit  Normal S1S2, No S3, No Murmur  Peripheral pulses are symmetrical and full  There is no clubbing, cyanosis of the extremities.   No edema  Femoral Arteries: 2+ and equal  Pedal Pulses: 2+ and equal   Abdomen:  No masses or tenderness  Liver/Spleen: No Abnormalities Noted  Neurological/Psychiatric:  Alert and oriented in all spheres  Moves all extremities well  Exhibits normal gait balance and coordination  No abnormalities of mood, affect, memory, mentation, or behavior are noted    Lab Results   Component Value Date    CHOL 111 10/23/2019    CHOL 162 07/14/2019    CHOL 154 07/13/2019     Lab Results   Component Value Date    TRIG 102 10/23/2019    TRIG 89 07/14/2019    TRIG 68 07/13/2019     Lab Results   Component Value Date    HDL 31 (L) 10/23/2019    HDL 39 (L) 07/14/2019    HDL 52 07/13/2019     Lab Results   Component Value Date    LDLCALC 60 10/23/2019    LDLCALC 105 (H) 07/14/2019    LDLCALC 88 07/13/2019     Lab Results   Component Value Date    LABVLDL 20 10/23/2019    LABVLDL 18 07/14/2019    LABVLDL 14 07/13/2019     No results found for: CHOLHDLRATIO    Left heart cath 7/13/19  Procedures: LHC, LVG, CA, sedation     LM <20%  LAD 30%/  Cx <20%  RCA 30%  LVEF 30%  Nonischemic cardiomyopathy        Limited echocardiogram 10/23/19  Summary   Limited only f/u for LVEF. Normal left ventricular systolic function with ejection fraction of 55-60%. No regional wall motion abnormalites are seen. Compared to previous study from 9- no changes noted in left   ventricular function. EKG 7/15/20  Sinus  Bradycardia   WITHIN NORMAL LIMITS    Echocardiogram 9/23/2021  Summary   Normal left ventricle systolic function with an estimated ejection fraction   of 55%. No regional wall motion abnormalities are seen. Normal left ventricular diastolic filling pressures. Individual aortic valve leaflets are not clearly visualized. Trace aortic, mitral and tricuspid valve regurgitation. Systolic pulmonary artery pressure (sPAP) is normal and estimated at 21 mmHg   (right atrial pressure 3 mmHg)   Compared to last echo on 10/23/2019, no change in EF      Latest Reference Range & Units 10/23/19 07:06   CHOLESTEROL, TOTAL, 365077 0 - 199 mg/dL 111   HDL Cholesterol 40 - 60 mg/dL 31 (L)   LDL Calculated <100 mg/dL 60   Triglycerides 0 - 150 mg/dL 102   VLDL Cholesterol Calculated Not Established mg/dL 20       Assessment:   CAD - nonobstructive. Consider progression    SOB - cpossible angina equivalent  Chest pain   HTN - controlled  HLD - stable  H/O nonischemic CMP - EF normal 10/2019. Needs recheck. Attemp to further titrate up meds caused side effects. Plan:  Recommend a stress echocardiogram   Cardiac medications reviewed including indications and pertinent side effects. Medication list updated at this visit. No changes.  Cont ASA and statin   Check blood pressure and heart rate at home a few times per week- keep a log with dates and times and bring to office visit   Regular exercise and following a healthy diet encouraged   Follow up with me in 6 months   Will obtain labs from MD Deuce Puga's attestation: This note was scribed in the presence of Dr. Amos Brooke M.D. By Mata Pereira RN    The scribes documentation has been prepared under my direction and personally reviewed by me in its entirety. I confirm that the note above accurately reflects all work, treatment, procedures, and medical decision making performed by me. Dr. Amos Brooke MD    Thank you for allowing me to participate in the care of this individual.        Eugenio Camp.  Evelyn Nicolas M.D., Isauro Talbot

## 2022-12-15 ENCOUNTER — TELEPHONE (OUTPATIENT)
Dept: CARDIOLOGY CLINIC | Age: 57
End: 2022-12-15

## 2022-12-15 ENCOUNTER — TELEPHONE (OUTPATIENT)
Dept: PULMONOLOGY | Age: 57
End: 2022-12-15

## 2022-12-15 DIAGNOSIS — I25.10 CORONARY ARTERY DISEASE INVOLVING NATIVE CORONARY ARTERY OF NATIVE HEART WITHOUT ANGINA PECTORIS: ICD-10-CM

## 2022-12-15 DIAGNOSIS — I10 ESSENTIAL HYPERTENSION: ICD-10-CM

## 2022-12-15 DIAGNOSIS — R06.02 SOB (SHORTNESS OF BREATH): Primary | ICD-10-CM

## 2022-12-15 NOTE — TELEPHONE ENCOUNTER
Patient did not show for 1yr ct  appointment  with Dr. Maxwell Harrington  on 12/15/22    Same Day Cancellation: No    Patient rescheduled:  No    New appointment:     Patient was also no show on: 05/26/21, 0922/21, 10/03/22, 1010/22    LOV      Assessment: 12/29/21      Observed sleep apnea and hypersomnia  SOB, cough and wheezes - likely COPD with AE   Mild emphysema with acute exacerbation  Pulmonary nodules-stable on repeat CT 9/23/2021. Nonischemic cardiomyopathy and hypertension followed by cardiology  45 pack year smoking       Plan:       PSG evaluate for sleep related breathing disorder. Advised to reschedule PFTts in 6 weeks   Continue Spiriva and albuterol 2 puffs Q4-6 hrs PRN  Advised to start steroid and Abx prescribed by PCP ASAP  ER if not better   CT chest, low dose protocol, screening for lung cancer September 2022. Risks, benefits and alternatives including doing nothing were discussed with patient.   Smoking cessation counseling  Follow up after CT chest

## 2022-12-15 NOTE — TELEPHONE ENCOUNTER
Pt called stating she has been extremely tired the last few days. She stated she slept all day yesterday and woke up at 8am this morning feeling like she could sleep more. Pt stated she is sob to the point she feels it hard to catch her breath. Pt stated she has mild edema. Her bp was 157/92 and pulse was 74.  Please advise

## 2022-12-15 NOTE — TELEPHONE ENCOUNTER
[facility] patient. I spoke with patient. Patient seen 11/2022 due to having SOB. Stress echo ordered at this visit. Patient did not schedule testing since she left due to feeling better. States she is now having SOB again with some fatigue. She would like to scheduled testing. Does not thinks he can do a treadmill. Can we change to Annie and echo?

## 2022-12-16 NOTE — TELEPHONE ENCOUNTER
Called patient and left message. Stress test and echo ordered. Placed instructions that we can change to Annie if needed.

## 2022-12-16 NOTE — TELEPHONE ENCOUNTER
If there are concerns regarding her ability to exercise, we can order stress Myoview. Can try to exercise her or at least perform walking Lexiscan and if she needs converted to [facility] ultimately that is fine.  TY

## 2022-12-21 NOTE — TELEPHONE ENCOUNTER
Tried to call patient to susie appt. Message states person is not taking calls at this time try back later.

## 2022-12-28 NOTE — TELEPHONE ENCOUNTER
PFT susie 1/19/23 appt with Dr Sharan Corona [facility] 1/26/23. Patient aware of appts.

## 2023-01-16 RX ORDER — LISINOPRIL 10 MG/1
10 TABLET ORAL 2 TIMES DAILY
Qty: 180 TABLET | Refills: 3 | Status: SHIPPED | OUTPATIENT
Start: 2023-01-16

## 2023-01-26 ENCOUNTER — HOSPITAL ENCOUNTER (OUTPATIENT)
Dept: CARDIOLOGY | Age: 58
Discharge: HOME | End: 2023-01-26

## 2023-01-26 RX ORDER — ASPIRIN 81 MG/1
TABLET, COATED ORAL
Qty: 90 TABLET | Refills: 3 | Status: SHIPPED | OUTPATIENT
Start: 2023-01-26

## 2023-02-14 DIAGNOSIS — J43.9 ACUTE EXACERBATION OF EMPHYSEMA (HCC): ICD-10-CM

## 2023-02-17 DIAGNOSIS — J43.9 ACUTE EXACERBATION OF EMPHYSEMA (HCC): ICD-10-CM

## 2023-02-17 RX ORDER — CARVEDILOL 6.25 MG/1
6.25 TABLET ORAL 2 TIMES DAILY WITH MEALS
Qty: 180 TABLET | Refills: 3 | Status: SHIPPED | OUTPATIENT
Start: 2023-02-17

## 2023-02-27 ENCOUNTER — TRANSCRIBE ORDERS (OUTPATIENT)
Dept: ADMINISTRATIVE | Age: 58
End: 2023-02-27

## 2023-02-27 DIAGNOSIS — J43.9 PULMONARY EMPHYSEMA, UNSPECIFIED EMPHYSEMA TYPE (HCC): Primary | ICD-10-CM

## 2023-03-07 ENCOUNTER — HOSPITAL ENCOUNTER (OUTPATIENT)
Dept: CARDIOLOGY | Age: 58
Discharge: HOME | End: 2023-03-07

## 2023-03-07 ENCOUNTER — HOSPITAL ENCOUNTER (OUTPATIENT)
Dept: NON INVASIVE DIAGNOSTICS | Age: 58
Discharge: HOME | End: 2023-03-07

## 2023-06-06 ENCOUNTER — OFFICE VISIT (OUTPATIENT)
Dept: UROGYNECOLOGY | Age: 58
End: 2023-06-06
Payer: COMMERCIAL

## 2023-06-06 VITALS
HEART RATE: 61 BPM | RESPIRATION RATE: 18 BRPM | TEMPERATURE: 97.8 F | DIASTOLIC BLOOD PRESSURE: 66 MMHG | OXYGEN SATURATION: 98 % | SYSTOLIC BLOOD PRESSURE: 104 MMHG

## 2023-06-06 DIAGNOSIS — L72.3 SEBACEOUS CYST: Primary | ICD-10-CM

## 2023-06-06 PROCEDURE — G8420 CALC BMI NORM PARAMETERS: HCPCS | Performed by: OBSTETRICS & GYNECOLOGY

## 2023-06-06 PROCEDURE — 99242 OFF/OP CONSLTJ NEW/EST SF 20: CPT | Performed by: OBSTETRICS & GYNECOLOGY

## 2023-06-06 PROCEDURE — G8427 DOCREV CUR MEDS BY ELIG CLIN: HCPCS | Performed by: OBSTETRICS & GYNECOLOGY

## 2023-06-06 PROCEDURE — 3074F SYST BP LT 130 MM HG: CPT | Performed by: OBSTETRICS & GYNECOLOGY

## 2023-06-06 PROCEDURE — 3078F DIAST BP <80 MM HG: CPT | Performed by: OBSTETRICS & GYNECOLOGY

## 2023-06-06 RX ORDER — VENLAFAXINE HYDROCHLORIDE 150 MG/1
CAPSULE, EXTENDED RELEASE ORAL
COMMUNITY
Start: 2023-05-31

## 2023-06-06 RX ORDER — ALBUTEROL SULFATE 2.5 MG/3ML
SOLUTION RESPIRATORY (INHALATION)
COMMUNITY
Start: 2023-05-24

## 2023-06-06 RX ORDER — POTASSIUM CHLORIDE 750 MG/1
TABLET, EXTENDED RELEASE ORAL
COMMUNITY
Start: 2023-05-17

## 2023-06-06 RX ORDER — OMEPRAZOLE 40 MG/1
CAPSULE, DELAYED RELEASE ORAL
COMMUNITY
Start: 2023-05-31

## 2023-06-06 ASSESSMENT — ENCOUNTER SYMPTOMS
RECTAL PAIN: 1
COUGH: 1
WHEEZING: 1
ABDOMINAL PAIN: 1
NAUSEA: 1
DIARRHEA: 1
CONSTIPATION: 1
BACK PAIN: 1
SHORTNESS OF BREATH: 1

## 2023-06-06 NOTE — PROGRESS NOTES
prolonged periods? No  Do you push the protrusion back to help with a bowel movement or to empty your bladder? No  Have you ever used a pessary (plastic support device) for this problem? No      Bowel problem(s): Yes   How long have you had bowel symptoms? 1 years  Do you have accidental loss of solid stool? No  Do you have accidental loss of liquid stool? Yes  Do you have accidental loss of gas? No  How many episodes per week? daily  Do you have constipation? Yes Do you have diarrhea? Yes Problems with bloating? Yes  Do you have frequent desire to have a bowel movement? Yes  Do you feel that your bowels are never completely empty? Yes  Do you ever place your fingers in your vagina or between the vagina and rectum to help with a bowel movement? No  Have you seen a physician for bowel symptoms? No     Sexual History:  reports being sexually active and has had partner(s) who are male.   Pelvic Pain:  No   Ob/Gyn History:    OB History    Para Term  AB Living   4 3 3   1 3   SAB IAB Ectopic Molar Multiple Live Births       1     3      # Outcome Date GA Lbr Vince/2nd Weight Sex Delivery Anes PTL Lv   4 Ectopic            3 Term      CS-Classical   KAMILLE   2 Term      Vag-Spont   KAMILLE   1 Term      Vag-Spont   KAMILLE     Past Medical History:   Past Medical History:   Diagnosis Date    Anxiety     Arthritis     Asthma     CAD (coronary artery disease)     Cancer ([facility]     skin \"precancerous\"    CHF (congestive heart failure) (HCC)     COPD (chronic obstructive pulmonary disease) (HCC)     Cystitis, interstitial     Depression     Diabetes mellitus (HCC)     borderline    DUB (dysfunctional uterine bleeding) 3/2013    GERD (gastroesophageal reflux disease)     Hypercholesterolemia     no meds currently    Hypertension     Kidney stones     Low blood potassium     PONV (postoperative nausea and vomiting)     Sleep apnea     never fitted for an appliance     Past Surgical History:   Past Surgical History:   Procedure

## 2023-09-25 ENCOUNTER — TELEPHONE (OUTPATIENT)
Dept: TELEMETRY | Age: 58
End: 2023-09-25

## 2023-09-25 NOTE — TELEPHONE ENCOUNTER
Patient due for annual CT Lung Screening. Reminder letter mailed. Routed to PCP, Nica Ibrahim MD with [facility].     Cyndee Smalls RN

## 2023-12-07 ENCOUNTER — TELEPHONE (OUTPATIENT)
Dept: TELEMETRY | Age: 58
End: 2023-12-07

## 2023-12-07 NOTE — TELEPHONE ENCOUNTER
Patient due for annual CT Lung Screening. Reminder letter mailed. Routed to PCP, Viktoria Aragon MD with [facility].     Debora Alexander RN

## 2024-01-18 NOTE — TELEPHONE ENCOUNTER
Last OV 11/11/22  No upcoming OV  BMP 7/15/20  CBC 7/15/20    Plan:  Recommend a stress echocardiogram   Cardiac medications reviewed including indications and pertinent side effects. Medication list updated at this visit. No changes. Cont ASA and statin   Check blood pressure and heart rate at home a few times per week- keep a log with dates and times and bring to office visit   Regular exercise and following a healthy diet encouraged   Follow up with me in 6 months   Will obtain labs from TOMY ADAMSON MD    Front - please call and schedule pt appt.

## 2024-01-19 NOTE — TELEPHONE ENCOUNTER
1/19 Called [phone]. [facility] for pt to call and schedule annual appt with [facility] [place] medication refill.

## 2024-01-22 RX ORDER — ASPIRIN 81 MG/1
81 TABLET, COATED ORAL DAILY
Qty: 30 TABLET | Refills: 0 | Status: SHIPPED | OUTPATIENT
Start: 2024-01-22

## 2024-01-22 RX ORDER — LISINOPRIL 10 MG/1
TABLET ORAL
Qty: 60 TABLET | Refills: 0 | Status: SHIPPED | OUTPATIENT
Start: 2024-01-22

## 2024-01-22 RX ORDER — CARVEDILOL 6.25 MG/1
TABLET ORAL
Qty: 60 TABLET | Refills: 0 | Status: SHIPPED | OUTPATIENT
Start: 2024-01-22

## 2024-03-07 ENCOUNTER — TELEPHONE (OUTPATIENT)
Dept: TELEMETRY | Age: 59
End: 2024-03-07

## 2024-03-07 NOTE — TELEPHONE ENCOUNTER
Patient due for annual CT Lung Screening. Reminder letter mailed.    Scan already ordered. Central Scheduling number provided.    Maddie Sher RN

## 2024-08-20 ENCOUNTER — HOSPITAL ENCOUNTER (OUTPATIENT)
Dept: CT IMAGING | Age: 59
Discharge: HOME | End: 2024-08-20
Attending: FAMILY MEDICINE
Payer: COMMERCIAL

## 2024-08-20 DIAGNOSIS — F17.210 CIGARETTE SMOKER: ICD-10-CM

## 2024-08-20 DIAGNOSIS — Z12.2 SCREENING FOR MALIGNANT NEOPLASM OF RESPIRATORY ORGAN: ICD-10-CM

## 2024-08-20 PROCEDURE — 71271 CT THORAX LUNG CANCER SCR C-: CPT

## 2024-12-24 ENCOUNTER — HOSPITAL ENCOUNTER (OUTPATIENT)
Dept: GENERAL RADIOLOGY | Age: 59
Discharge: HOME | End: 2024-12-24
Payer: COMMERCIAL

## 2024-12-24 ENCOUNTER — HOSPITAL ENCOUNTER (OUTPATIENT)
Age: 59
Discharge: HOME | End: 2024-12-24
Payer: COMMERCIAL

## 2024-12-24 DIAGNOSIS — S89.91XA INJURY OF RIGHT KNEE, INITIAL ENCOUNTER: ICD-10-CM

## 2024-12-24 PROCEDURE — 73560 X-RAY EXAM OF KNEE 1 OR 2: CPT

## 2024-12-30 NOTE — FLOWSHEET NOTE
[facility]  [facility]  Progress Note    SUBJECTIVE:  Chief Complaint   Patient presents with    Shortness of Breath     sob. gotten worse since last weekend when seen. Last chemo was dec 12th. Recent dx with pleural effusion     Leg Swelling     Bilat. On daily diuretic .Worsening over past couple of days     Patient is tolerating medications. No reported adverse drug reactions.  No nausea, vomiting, diarrhea.  Patient is sitting on edge of bed, she just finished getting washed up  She reports her breathing has improved since admission, denied SOB when getting cleaned up today  No fevers overnight    Review of systems:  As stated above in the chief complaint, otherwise negative.    Medications:  Scheduled Meds:   vancomycin  1,000 mg IntraVENous Q12H    apixaban  10 mg Oral BID    Followed by    [START ON 2025] apixaban  5 mg Oral BID    furosemide  20 mg Oral Daily    predniSONE  10 mg Oral Daily with breakfast    potassium chloride  10 mEq Oral Daily with breakfast    fentaNYL  1 patch TransDERmal Q72H    sodium chloride flush  5-40 mL IntraVENous 2 times per day    escitalopram  20 mg Oral Daily    folic acid  1 mg Oral Daily    gabapentin  300 mg Oral Nightly    losartan  50 mg Oral Daily    primidone  100 mg Oral Nightly     Continuous Infusions:   sodium chloride       PRN Meds:oxyCODONE-acetaminophen, sulfur hexafluoride microspheres, sodium chloride flush, sodium chloride, potassium chloride **OR** potassium alternative oral replacement **OR** potassium chloride, magnesium sulfate, ondansetron **OR** ondansetron, polyethylene glycol, acetaminophen **OR** acetaminophen, white petrolatum    OBJECTIVE:  /63   Pulse 76   Temp 97.7 °F (36.5 °C) (Axillary)   Resp 18   Ht 1.651 m (5' 5\")   Wt 71.7 kg (158 lb)   SpO2 97%   BMI 26.29 kg/m²   Temp  Av °F (36.7 °C)  Min: 97.7 °F (36.5 °C)  Max: 98.4 °F (36.9 °C)  Constitutional: The patient is awake, alert, and  proximal hip, and core control in self care, mobility, lifting, ambulation and eccentric single leg control. NMR and Therapeutic Activities:    [] (89581 or 86266) Provided verbal/tactile cueing for activities related to improving balance, coordination, kinesthetic sense, posture, motor skill, proprioception and motor activation to allow for proper function of core, proximal hip and LE with self care and ADLs  [] (97035) Gait Re-education- Provided training and instruction to the patient for proper LE, core and proximal hip recruitment and positioning and eccentric body weight control with ambulation re-education including up and down stairs     Home Exercise Program:    [x] (06327) Reviewed/Progressed HEP activities related to strengthening, flexibility, endurance, ROM of core, proximal hip and LE for functional self-care, mobility, lifting and ambulation/stair navigation   [] (27394)Reviewed/Progressed HEP activities related to improving balance, coordination, kinesthetic sense, posture, motor skill, proprioception of core, proximal hip and LE for self care, mobility, lifting, and ambulation/stair navigation      Manual Treatments:  PROM / STM / Oscillations-Mobs:  G-I, II, III, IV (PA's, Inf., Post.)  [] (54883) Provided manual therapy to mobilize LE, proximal hip and/or LS spine soft tissue/joints for the purpose of modulating pain, promoting relaxation,  increasing ROM, reducing/eliminating soft tissue swelling/inflammation/restriction, improving soft tissue extensibility and allowing for proper ROM for normal function with self care, mobility, lifting and ambulation.      Modalities:   HV/ CP 15 minutes    Charges:  Timed Code Treatment Minutes: 45   Total Treatment Minutes: 60     [] EVAL LOW 75099  [x] PX(45693) x  2   [] IONTO  [x] NMR (03499) x  1   [] VASO  [] Manual (25114) x       [] Other:  [] TA x       [] Mech Traction (99358)  [] ES(attended) (53840)      [x] ES (un) (17780):     GOALS:   Patient stated goal: Walk without crutch    Therapist goals for Patient:   Short Term Goals: To be achieved in: 2 weeks  1. Independent in HEP and progression per patient tolerance, in order to prevent re-injury. 2. Patient will have a decrease in pain to facilitate improvement in movement, function, and ADLs as indicated by Functional Deficits. Long Term Goals: To be achieved in: 8 weeks  1. Disability index score of 20% or less for the LEFS to assist with reaching prior level of function. 2. Patient will demonstrate increased AROM to 0-130 to allow for proper joint functioning as indicated by patients Functional Deficits. 3. Patient will demonstrate an increase in Strength to good proximal hip strength and control, within 5lb HHD in LE to allow for proper functional mobility as indicated by patients Functional Deficits. 4. Patient will return to full functional activities without increased symptoms or restriction including ability to ascend/ descend stairs with reciprocal gait. 5. Patient will ambulate I without gait abnormality. Progression Towards Functional goals:  [x] Patient is progressing as expected towards functional goals listed. [] Progression is slowed due to complexities listed. [] Progression has been slowed due to co-morbidities.   [] Plan just implemented, too soon to assess goals progression  [] Other:     ASSESSMENT:  Having increased pain today from going up/down steps the other day for laundry    Treatment/Activity Tolerance:  [x] Patient tolerated treatment well [] Patient limited by fatique  [] Patient limited by pain  [] Patient limited by other medical complications  [] Other:     Prognosis: [x] Good [] Fair  [] Poor    Patient Requires Follow-up: [x] Yes  [] No    PLAN:   [x] Continue per plan of care [] Alter current plan (see comments)  [] Plan of care initiated [] Hold pending MD visit [] Discharge    Electronically signed by: Michelle Gómez PT

## 2025-01-15 ENCOUNTER — TELEPHONE (OUTPATIENT)
Dept: CARDIOLOGY CLINIC | Age: 60
End: 2025-01-15

## 2025-01-15 DIAGNOSIS — I25.10 CORONARY ARTERY DISEASE INVOLVING NATIVE CORONARY ARTERY OF NATIVE HEART WITHOUT ANGINA PECTORIS: Primary | ICD-10-CM

## 2025-01-15 NOTE — TELEPHONE ENCOUNTER
Front - please call and schedule pt appt and relay labs, pt must keep appt for refills or contact pcp since she has had 30 days prior    Last Office Visit: 11/10/22 Provider: [facility]  **Is provider OOT? No    Next Office Visit: Visit date not found Provider:   **If no OV, when does pt need to be seen? in 6 month(s)  **Has patient already had 30 day supply? Yes    Lab orders needed? yes - CMP    LAST LABS:   CMP:   Lab Results   Component Value Date     07/15/2020    K 4.3 07/15/2020     07/15/2020    CO2 29 07/15/2020    BUN 13 07/15/2020    CREATININE 0.7 07/15/2020    GLUCOSE 93 07/15/2020    CALCIUM 9.2 07/15/2020    BILITOT 0.7 10/22/2019    ALKPHOS 105 10/22/2019    AST 11 (L) 10/22/2019    ALT 8 (L) 10/22/2019    LABGLOM >60 07/15/2020    GFRAA >60 07/15/2020    AGRATIO 1.4 10/22/2019    GLOB 3.0 10/22/2019

## 2025-01-15 NOTE — TELEPHONE ENCOUNTER
Attempted to reach pt, left vm to call office back. Please ask pt if she is having edema, SOB, what her current weight is, dizziness or palps.

## 2025-01-15 NOTE — TELEPHONE ENCOUNTER
Called Pt at [phone] to schedule 6 month follow up per [facility]. The pt voiced concerns about chest pain in the lower left quadrant. She has weight gain of 13lbs since December 2024. Client is afraid her body is going back to the way it was before her treatments.     Pt is scheduled for a Carpel tunnel surgery on 1/27/25 at the [facility].

## 2025-01-16 NOTE — TELEPHONE ENCOUNTER
Pt returned call. Pt sts she is NOT having active CP. She was SOB with activity, no swelling and weight is 140 today. Pt sts she was on Z-clemente/steroids last week and she still taking. Told pt is she was having symptoms listed to her [facility] [place] to go to [place]. Pt sts she doesn't need to at this time. Pt was worried this would hold up surgery clearance.

## 2025-01-16 NOTE — TELEPHONE ENCOUNTER
Attempted to reach pt, left vm to call office back. Please ask pt if she is having edema, SOB, what her current weight is, dizziness or palps.      If pt is having active CP, pt needs to go to ER

## 2025-01-16 NOTE — TELEPHONE ENCOUNTER
Front please call pt to [facility] f/u appt for a couple weeks out for her infection to be resoloved

## 2025-01-17 RX ORDER — LISINOPRIL 10 MG/1
TABLET ORAL
Qty: 60 TABLET | Refills: 0 | Status: SHIPPED | OUTPATIENT
Start: 2025-01-17

## 2025-01-17 NOTE — TELEPHONE ENCOUNTER
Spoke to pt. [place] [facility] [place] Pt stated that she will be done with Steroids on Monday 1/20/25

## 2025-01-21 ENCOUNTER — OFFICE VISIT (OUTPATIENT)
Dept: CARDIOLOGY CLINIC | Age: 60
End: 2025-01-21

## 2025-01-21 VITALS
DIASTOLIC BLOOD PRESSURE: 82 MMHG | BODY MASS INDEX: 23.73 KG/M2 | HEIGHT: 64 IN | WEIGHT: 139 LBS | OXYGEN SATURATION: 95 % | SYSTOLIC BLOOD PRESSURE: 138 MMHG | HEART RATE: 78 BPM

## 2025-01-21 DIAGNOSIS — I10 ESSENTIAL HYPERTENSION: ICD-10-CM

## 2025-01-21 DIAGNOSIS — Z72.0 TOBACCO USE: ICD-10-CM

## 2025-01-21 DIAGNOSIS — I25.10 CORONARY ARTERY DISEASE INVOLVING NATIVE CORONARY ARTERY OF NATIVE HEART WITHOUT ANGINA PECTORIS: Primary | ICD-10-CM

## 2025-01-21 DIAGNOSIS — R07.9 CHEST PAIN, UNSPECIFIED TYPE: ICD-10-CM

## 2025-01-21 DIAGNOSIS — E78.2 MIXED HYPERLIPIDEMIA: ICD-10-CM

## 2025-01-21 DIAGNOSIS — R06.02 SHORTNESS OF BREATH: ICD-10-CM

## 2025-01-21 RX ORDER — LISINOPRIL 10 MG/1
10 TABLET ORAL 2 TIMES DAILY
Qty: 180 TABLET | Refills: 3 | Status: SHIPPED | OUTPATIENT
Start: 2025-01-21

## 2025-01-21 RX ORDER — LANOLIN ALCOHOL/MO/W.PET/CERES
CREAM (GRAM) TOPICAL
COMMUNITY
Start: 2025-01-20

## 2025-01-21 RX ORDER — CARVEDILOL 6.25 MG/1
6.25 TABLET ORAL 2 TIMES DAILY
Qty: 180 TABLET | Refills: 3 | Status: SHIPPED | OUTPATIENT
Start: 2025-01-21

## 2025-01-21 NOTE — PATIENT INSTRUCTIONS
Plan:  Patients is a current smoker. Educational material provided to patient.      ~Echocardiogram   ~Recommend a stress test- Lexiscan Docea [place]. Patient is not able to walk on a treadmill    ~Call [facility] scheduling at [phone] to schedule testing    ~Restart Aspirin   ~Restart Coreg 6.25 mg twice a day   ~Increase Lisinopril to 10 mg twice a day     Cardiac medications reviewed including indications and pertinent side effects. Medication list updated at this visit.   Patient verbalizes understanding of the need for treatment and education has been provided at today's visit. Additional education material will be provided in after visit summary.    Check blood pressure and heart rate at home a few times per week- keep a log with dates and times and bring to office visit   Regular exercise and following a healthy diet encouraged   Follow up with me in 2 months

## 2025-01-21 NOTE — PROGRESS NOTES
[facility]   Cardiac Follow up     Referring Provider:  Mateo Dotson MD     Chief Complaint   Patient presents with    Follow-up    Congestive Heart Failure    Cardiomyopathy    Coronary Artery Disease    Hypertension    Chest Pain    Dizziness    Shortness of Breath    Palpitations        Dahlia Roa  1965      History of Present Illness:   Dahlia Roa is a 59 y.o. female who is here today for follow up for a past medical history of coronary artery disease, nonischemic cardiomyopathy, hypertension, hyperlipidemia, and shortness of breath. Patient had a left heart cath 2019 which showed nonobstructive coronary artery disease, EF 30%, Echocardiogram in 10/2019 showed EF has improved to 55-60%. She has a history of alcohol use.    At her visit on 11/10/2022, she reported SOB and chest pain. A stress echocardiogram was recommended, but not completed.    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 She states during her increased work load she started to feeling fatigued. She states she then became sick and was on steroids and antibiotics. She has left sided chest pain that started one month ago. She states her pain is worse with exertion like walking. Pain does not radiate, she has associated SOB. Pain comes on with activity like laundry and vacuuming. She is scheduled for carpel tunnel surgery this coming Monday. She stopped her ASA in preparation for her surgery. She has noticed some weight gain. Patient currently denies any weight gain, edema, palpitations, dizziness, and syncope.  Her blood pressure at home is running 140/70's.        Past Medical History:   has a past medical history of Anxiety, Arthritis, Asthma, CAD (coronary artery disease), Cancer ([facility]), CHF (congestive heart failure) ([facility]), COPD (chronic obstructive pulmonary disease) ([facility]), Cystitis, interstitial, Depression, Diabetes mellitus (HCC), DUB (dysfunctional uterine bleeding), GERD

## 2025-03-11 ENCOUNTER — TELEPHONE (OUTPATIENT)
Dept: CARDIOLOGY CLINIC | Age: 60
End: 2025-03-11

## 2025-03-11 ENCOUNTER — HOSPITAL ENCOUNTER (OUTPATIENT)
Dept: NUCLEAR MEDICINE | Age: 60
Discharge: HOME | End: 2025-03-11
Attending: INTERNAL MEDICINE
Payer: COMMERCIAL

## 2025-03-11 ENCOUNTER — HOSPITAL ENCOUNTER (OUTPATIENT)
Age: 60
Discharge: HOME | End: 2025-03-13
Attending: INTERNAL MEDICINE
Payer: COMMERCIAL

## 2025-03-11 ENCOUNTER — RESULTS FOLLOW-UP (OUTPATIENT)
Dept: NUCLEAR MEDICINE | Age: 60
End: 2025-03-11

## 2025-03-11 DIAGNOSIS — I25.10 CORONARY ARTERY DISEASE INVOLVING NATIVE CORONARY ARTERY OF NATIVE HEART WITHOUT ANGINA PECTORIS: ICD-10-CM

## 2025-03-11 LAB
NUC STRESS EJECTION FRACTION: 74 %
NUC STRESS LV EDV: 77 ML (ref 56–104)
NUC STRESS LV ESV: 20 ML (ref 19–49)
NUC STRESS LV MASS: 121 G
STRESS BASELINE DIAS BP: 72 MMHG
STRESS BASELINE HR: 64 BPM
STRESS BASELINE SYS BP: 145 MMHG
STRESS ESTIMATED WORKLOAD: 1 METS
STRESS PEAK DIAS BP: 71 MMHG
STRESS PEAK SYS BP: 151 MMHG
STRESS PERCENT HR ACHIEVED: 65 %
STRESS POST PEAK HR: 103 BPM
STRESS RATE PRESSURE PRODUCT: NORMAL BPM*MMHG
STRESS TARGET HR: 159 BPM

## 2025-03-11 PROCEDURE — 6360000002 HC RX W HCPCS: Performed by: INTERNAL MEDICINE

## 2025-03-11 PROCEDURE — 93016 CV STRESS TEST SUPVJ ONLY: CPT | Performed by: INTERNAL MEDICINE

## 2025-03-11 PROCEDURE — 78452 HT MUSCLE IMAGE SPECT MULT: CPT | Performed by: INTERNAL MEDICINE

## 2025-03-11 PROCEDURE — 3430000000 HC RX DIAGNOSTIC RADIOPHARMACEUTICAL: Performed by: INTERNAL MEDICINE

## 2025-03-11 PROCEDURE — 78452 HT MUSCLE IMAGE SPECT MULT: CPT

## 2025-03-11 PROCEDURE — A9502 TC99M TETROFOSMIN: HCPCS | Performed by: INTERNAL MEDICINE

## 2025-03-11 PROCEDURE — 93017 CV STRESS TEST TRACING ONLY: CPT

## 2025-03-11 PROCEDURE — 93018 CV STRESS TEST I&R ONLY: CPT | Performed by: INTERNAL MEDICINE

## 2025-03-11 RX ORDER — REGADENOSON 0.08 MG/ML
0.4 INJECTION, SOLUTION INTRAVENOUS
Status: COMPLETED | OUTPATIENT
Start: 2025-03-11 | End: 2025-03-11

## 2025-03-11 RX ADMIN — TETROFOSMIN 10.8 MILLICURIE: 1.38 INJECTION, POWDER, LYOPHILIZED, FOR SOLUTION INTRAVENOUS at 12:34

## 2025-03-11 RX ADMIN — TETROFOSMIN 31.6 MILLICURIE: 1.38 INJECTION, POWDER, LYOPHILIZED, FOR SOLUTION INTRAVENOUS at 14:46

## 2025-03-11 RX ADMIN — REGADENOSON 0.4 MG: 0.08 INJECTION, SOLUTION INTRAVENOUS at 14:46

## 2025-07-07 ENCOUNTER — TELEPHONE (OUTPATIENT)
Dept: CARDIOLOGY CLINIC | Age: 60
End: 2025-07-07

## 2025-07-07 NOTE — TELEPHONE ENCOUNTER
CARDIAC CLEARANCE REQUEST    What type of procedure are you having:  Carpal tunnel   Are you taking any blood thinners:    Type on anesthesia:    When is your procedure scheduled for:  7.17.25  What physician is performing your procedure:  Doug Valle  Phone Number:  [phone]  Fax number to send the letter:       LOV 1.21.25 [facility] [place] [place]  NOV 10.01.25 [facility] [place] [place]

## 2025-08-06 ENCOUNTER — HOSPITAL ENCOUNTER (OUTPATIENT)
Dept: GENERAL RADIOLOGY | Age: 60
Discharge: HOME | End: 2025-08-06
Payer: COMMERCIAL

## 2025-08-06 ENCOUNTER — HOSPITAL ENCOUNTER (OUTPATIENT)
Dept: LAB | Age: 60
Discharge: HOME | End: 2025-08-06
Payer: COMMERCIAL

## 2025-08-06 DIAGNOSIS — M25.551 RIGHT HIP PAIN: ICD-10-CM

## 2025-08-06 DIAGNOSIS — M54.50 LOW BACK PAIN, UNSPECIFIED BACK PAIN LATERALITY, UNSPECIFIED CHRONICITY, UNSPECIFIED WHETHER SCIATICA PRESENT: ICD-10-CM

## 2025-08-06 PROCEDURE — 73522 X-RAY EXAM HIPS BI 3-4 VIEWS: CPT

## 2025-08-06 PROCEDURE — 72100 X-RAY EXAM L-S SPINE 2/3 VWS: CPT

## 2025-08-18 ENCOUNTER — TELEPHONE (OUTPATIENT)
Dept: TELEMETRY | Age: 60
End: 2025-08-18

## 2025-08-18 DIAGNOSIS — F17.210 CIGARETTE NICOTINE DEPENDENCE WITHOUT COMPLICATION: Primary | ICD-10-CM
